# Patient Record
Sex: MALE | Race: WHITE | NOT HISPANIC OR LATINO | URBAN - METROPOLITAN AREA
[De-identification: names, ages, dates, MRNs, and addresses within clinical notes are randomized per-mention and may not be internally consistent; named-entity substitution may affect disease eponyms.]

---

## 2019-02-25 PROBLEM — Z00.00 ENCOUNTER FOR PREVENTIVE HEALTH EXAMINATION: Status: ACTIVE | Noted: 2019-02-25

## 2019-06-17 ENCOUNTER — INPATIENT (INPATIENT)
Facility: HOSPITAL | Age: 64
LOS: 2 days | Discharge: HOME | DRG: 312 | End: 2019-06-20
Attending: INTERNAL MEDICINE | Admitting: INTERNAL MEDICINE
Payer: COMMERCIAL

## 2019-06-17 VITALS
SYSTOLIC BLOOD PRESSURE: 133 MMHG | RESPIRATION RATE: 18 BRPM | WEIGHT: 205.03 LBS | DIASTOLIC BLOOD PRESSURE: 87 MMHG | HEIGHT: 72 IN | HEART RATE: 71 BPM | TEMPERATURE: 98 F | OXYGEN SATURATION: 100 %

## 2019-06-17 DIAGNOSIS — D35.2 BENIGN NEOPLASM OF PITUITARY GLAND: Chronic | ICD-10-CM

## 2019-06-17 DIAGNOSIS — R42 DIZZINESS AND GIDDINESS: ICD-10-CM

## 2019-06-17 LAB
ALBUMIN SERPL ELPH-MCNC: 5 G/DL — SIGNIFICANT CHANGE UP (ref 3.3–5)
ALP SERPL-CCNC: 69 U/L — SIGNIFICANT CHANGE UP (ref 40–120)
ALT FLD-CCNC: 32 U/L — SIGNIFICANT CHANGE UP (ref 10–45)
ANION GAP SERPL CALC-SCNC: 15 MMOL/L — SIGNIFICANT CHANGE UP (ref 5–17)
APPEARANCE UR: CLEAR — SIGNIFICANT CHANGE UP
AST SERPL-CCNC: 27 U/L — SIGNIFICANT CHANGE UP (ref 10–40)
BASOPHILS # BLD AUTO: 0 K/UL — SIGNIFICANT CHANGE UP (ref 0–0.2)
BASOPHILS NFR BLD AUTO: 0.2 % — SIGNIFICANT CHANGE UP (ref 0–2)
BILIRUB SERPL-MCNC: 0.5 MG/DL — SIGNIFICANT CHANGE UP (ref 0.2–1.2)
BILIRUB UR-MCNC: NEGATIVE — SIGNIFICANT CHANGE UP
BUN SERPL-MCNC: 19 MG/DL — SIGNIFICANT CHANGE UP (ref 7–23)
CALCIUM SERPL-MCNC: 10.2 MG/DL — SIGNIFICANT CHANGE UP (ref 8.4–10.5)
CHLORIDE SERPL-SCNC: 103 MMOL/L — SIGNIFICANT CHANGE UP (ref 96–108)
CHLORIDE UR-SCNC: <35 MMOL/L — SIGNIFICANT CHANGE UP
CO2 SERPL-SCNC: 25 MMOL/L — SIGNIFICANT CHANGE UP (ref 22–31)
COLOR SPEC: COLORLESS — SIGNIFICANT CHANGE UP
CREAT ?TM UR-MCNC: 31 MG/DL — SIGNIFICANT CHANGE UP
CREAT SERPL-MCNC: 1.12 MG/DL — SIGNIFICANT CHANGE UP (ref 0.5–1.3)
DIFF PNL FLD: NEGATIVE — SIGNIFICANT CHANGE UP
EOSINOPHIL # BLD AUTO: 0 K/UL — SIGNIFICANT CHANGE UP (ref 0–0.5)
EOSINOPHIL NFR BLD AUTO: 0.4 % — SIGNIFICANT CHANGE UP (ref 0–6)
GLUCOSE SERPL-MCNC: 104 MG/DL — HIGH (ref 70–99)
GLUCOSE UR QL: NEGATIVE — SIGNIFICANT CHANGE UP
HCT VFR BLD CALC: 48 % — SIGNIFICANT CHANGE UP (ref 39–50)
HGB BLD-MCNC: 16.8 G/DL — SIGNIFICANT CHANGE UP (ref 13–17)
KETONES UR-MCNC: NEGATIVE — SIGNIFICANT CHANGE UP
LEUKOCYTE ESTERASE UR-ACNC: NEGATIVE — SIGNIFICANT CHANGE UP
LYMPHOCYTES # BLD AUTO: 1.2 K/UL — SIGNIFICANT CHANGE UP (ref 1–3.3)
LYMPHOCYTES # BLD AUTO: 13.3 % — SIGNIFICANT CHANGE UP (ref 13–44)
MAGNESIUM SERPL-MCNC: 2.3 MG/DL — SIGNIFICANT CHANGE UP (ref 1.6–2.6)
MCHC RBC-ENTMCNC: 30.6 PG — SIGNIFICANT CHANGE UP (ref 27–34)
MCHC RBC-ENTMCNC: 35 GM/DL — SIGNIFICANT CHANGE UP (ref 32–36)
MCV RBC AUTO: 87.5 FL — SIGNIFICANT CHANGE UP (ref 80–100)
MONOCYTES # BLD AUTO: 0.6 K/UL — SIGNIFICANT CHANGE UP (ref 0–0.9)
MONOCYTES NFR BLD AUTO: 6.7 % — SIGNIFICANT CHANGE UP (ref 2–14)
NEUTROPHILS # BLD AUTO: 7.2 K/UL — SIGNIFICANT CHANGE UP (ref 1.8–7.4)
NEUTROPHILS NFR BLD AUTO: 79.5 % — HIGH (ref 43–77)
NITRITE UR-MCNC: NEGATIVE — SIGNIFICANT CHANGE UP
OSMOLALITY SERPL: 295 MOS/KG — SIGNIFICANT CHANGE UP (ref 275–300)
OSMOLALITY UR: 154 MOS/KG — LOW (ref 300–900)
PH UR: 7 — SIGNIFICANT CHANGE UP (ref 5–8)
PHOSPHATE SERPL-MCNC: 2 MG/DL — LOW (ref 2.5–4.5)
PLATELET # BLD AUTO: 187 K/UL — SIGNIFICANT CHANGE UP (ref 150–400)
POTASSIUM SERPL-MCNC: 4.2 MMOL/L — SIGNIFICANT CHANGE UP (ref 3.5–5.3)
POTASSIUM SERPL-SCNC: 4.2 MMOL/L — SIGNIFICANT CHANGE UP (ref 3.5–5.3)
PROT SERPL-MCNC: 8.1 G/DL — SIGNIFICANT CHANGE UP (ref 6–8.3)
PROT UR-MCNC: NEGATIVE — SIGNIFICANT CHANGE UP
RBC # BLD: 5.49 M/UL — SIGNIFICANT CHANGE UP (ref 4.2–5.8)
RBC # FLD: 12.9 % — SIGNIFICANT CHANGE UP (ref 10.3–14.5)
SODIUM SERPL-SCNC: 143 MMOL/L — SIGNIFICANT CHANGE UP (ref 135–145)
SODIUM UR-SCNC: <20 MMOL/L — SIGNIFICANT CHANGE UP
SP GR SPEC: 1.01 — LOW (ref 1.01–1.02)
TSH SERPL-MCNC: 2.36 UIU/ML — SIGNIFICANT CHANGE UP (ref 0.27–4.2)
UROBILINOGEN FLD QL: NEGATIVE — SIGNIFICANT CHANGE UP
VIT B12 SERPL-MCNC: 1183 PG/ML — SIGNIFICANT CHANGE UP (ref 232–1245)
WBC # BLD: 9 K/UL — SIGNIFICANT CHANGE UP (ref 3.8–10.5)
WBC # FLD AUTO: 9 K/UL — SIGNIFICANT CHANGE UP (ref 3.8–10.5)

## 2019-06-17 PROCEDURE — 93010 ELECTROCARDIOGRAM REPORT: CPT

## 2019-06-17 PROCEDURE — 99285 EMERGENCY DEPT VISIT HI MDM: CPT | Mod: 25

## 2019-06-17 PROCEDURE — 70450 CT HEAD/BRAIN W/O DYE: CPT | Mod: 26

## 2019-06-17 RX ORDER — HEPARIN SODIUM 5000 [USP'U]/ML
5000 INJECTION INTRAVENOUS; SUBCUTANEOUS EVERY 12 HOURS
Refills: 0 | Status: DISCONTINUED | OUTPATIENT
Start: 2019-06-17 | End: 2019-06-20

## 2019-06-17 RX ORDER — SODIUM CHLORIDE 9 MG/ML
1000 INJECTION, SOLUTION INTRAVENOUS ONCE
Refills: 0 | Status: COMPLETED | OUTPATIENT
Start: 2019-06-17 | End: 2019-06-17

## 2019-06-17 RX ORDER — DESMOPRESSIN ACETATE 0.1 MG/1
0.1 TABLET ORAL ONCE
Refills: 0 | Status: COMPLETED | OUTPATIENT
Start: 2019-06-17 | End: 2019-06-17

## 2019-06-17 RX ORDER — ONDANSETRON 8 MG/1
4 TABLET, FILM COATED ORAL ONCE
Refills: 0 | Status: COMPLETED | OUTPATIENT
Start: 2019-06-17 | End: 2019-06-17

## 2019-06-17 RX ORDER — PANTOPRAZOLE SODIUM 20 MG/1
40 TABLET, DELAYED RELEASE ORAL
Refills: 0 | Status: DISCONTINUED | OUTPATIENT
Start: 2019-06-17 | End: 2019-06-20

## 2019-06-17 RX ORDER — CITALOPRAM 10 MG/1
20 TABLET, FILM COATED ORAL DAILY
Refills: 0 | Status: DISCONTINUED | OUTPATIENT
Start: 2019-06-17 | End: 2019-06-20

## 2019-06-17 RX ORDER — DESMOPRESSIN ACETATE 0.1 MG/1
1 TABLET ORAL DAILY
Refills: 0 | Status: DISCONTINUED | OUTPATIENT
Start: 2019-06-17 | End: 2019-06-17

## 2019-06-17 RX ORDER — DESMOPRESSIN ACETATE 0.1 MG/1
1 TABLET ORAL DAILY
Refills: 0 | Status: DISCONTINUED | OUTPATIENT
Start: 2019-06-18 | End: 2019-06-20

## 2019-06-17 RX ORDER — MECLIZINE HCL 12.5 MG
25 TABLET ORAL ONCE
Refills: 0 | Status: COMPLETED | OUTPATIENT
Start: 2019-06-17 | End: 2019-06-17

## 2019-06-17 RX ORDER — DIAZEPAM 5 MG
5 TABLET ORAL ONCE
Refills: 0 | Status: DISCONTINUED | OUTPATIENT
Start: 2019-06-17 | End: 2019-06-17

## 2019-06-17 RX ADMIN — DESMOPRESSIN ACETATE 0.1 MILLIGRAM(S): 0.1 TABLET ORAL at 19:50

## 2019-06-17 RX ADMIN — HEPARIN SODIUM 5000 UNIT(S): 5000 INJECTION INTRAVENOUS; SUBCUTANEOUS at 23:41

## 2019-06-17 RX ADMIN — ONDANSETRON 4 MILLIGRAM(S): 8 TABLET, FILM COATED ORAL at 21:41

## 2019-06-17 RX ADMIN — Medication 5 MILLIGRAM(S): at 15:49

## 2019-06-17 RX ADMIN — CITALOPRAM 20 MILLIGRAM(S): 10 TABLET, FILM COATED ORAL at 23:42

## 2019-06-17 RX ADMIN — SODIUM CHLORIDE 1000 MILLILITER(S): 9 INJECTION, SOLUTION INTRAVENOUS at 14:44

## 2019-06-17 NOTE — PATIENT PROFILE ADULT - PATIENT REPRESENTATIVE: ( YOU CAN CHOOSE ANY PERSON THAT CAN ASSIST YOU WITH YOUR HEALTH CARE PREFERENCES, DOES NOT HAVE TO BE A SPOUSE, IMMEDIATE FAMILY OR SIGNIFICANT OTHER/PARTNER)
Pt reports elevated BP, was sent by PMD, reports sob, denies chest pain, arm pain, jaw or back pain yes

## 2019-06-17 NOTE — ED PROVIDER NOTE - ATTENDING CONTRIBUTION TO CARE
Nemes - 64yo M w/ hx of pituitary adenoma s/p distant resection and two separate revisions with subsequent diabetes insipidus on DDAVP daily, vertigo, presents with two days of progressively worsening dizziness and unsteadiness on his feet. Accompanied by nausea no vomiting but no spinning sensation. No other stx. On exam wide based gait, mild hesitancy to finger-to-nose coordination, otherwise neuro intact, no nystagmus. Poss hyponatremia vs vertigo vs CNS mass/bleed vs posterior circulation insufficiency. Will get w/u, likely admit given persistent stx and need for more w/u

## 2019-06-17 NOTE — ED PROVIDER NOTE - NS ED ROS FT
REVIEW OF SYSTEMS:    Constitutional:     [ ] negative [- ] fevers [- ] chills [ ] weight loss [ ] weight gain  HEENT:                  [ ] negative [ ] dry eyes [ ] eye irritation [ ] postnasal drip [ ] nasal congestion  CV:                         [ ] negative  [- ] chest pain [- ] orthopnea [- ] palpitations [ ] murmur  Resp:                     [ ] negative [- ] cough [ ] shortness of breath [ ] dyspnea [ ] wheezing [ ] sputum [ ] hemoptysis  GI:                          [ ] negative [+ ] nausea [- ] vomiting [ -] diarrhea [ ] constipation [ ] abd pain [ ] dysphagia   :                        [ ] negative [ -] dysuria [ -] nocturia [ -] hematuria [ +] increased urinary frequency  Musculoskeletal: [ ] negative [ -] back pain [ ] myalgias [ ] arthralgias [ ] fracture  Skin:                       [ ] negative [ -] rash [ ] itch  Neurological:        [ ] negative [- ] headache [+ ] dizziness [- ] syncope [ ] weakness [ ] numbness  Psychiatric:           [ ] negative [ ] anxiety [ ] depression  Endocrine:            [ ] negative [ +] diabetes [ ] thyroid problem  Heme/Lymph:      [ ] negative [ ] anemia [ ] bleeding problem  Allergic/Immune: [ ] negative [ ] itchy eyes [ ] nasal discharge [ ] hives [ ] angioedema    [ x] All other systems negative  [ ] Unable to assess ROS because ________.

## 2019-06-17 NOTE — H&P ADULT - HISTORY OF PRESENT ILLNESS
CHIEF COMPLAINT:Patient is a 63y old  Male who presents with a chief complaint of dizziness.    HPI:  63M w/ hx of pituitary adenoma s/p resection and two separate revisions with subsequent diabetes insipidus on DDAVP daily presents with two days of progressively worsening dizziness and unsteadiness on his feet. He denies room spinning. The dizziness is accompanied by nausea no vomiting. Pt states he has been urinating much more than normal despite normal dose of DDAVP. Denies dysuria, fevers, chills, chest pain, neck pain, SOB, abd pain, diarrhea, paresthesias, numbness one sided weakness, blurry vision. No worsening hearing loss, or ear fullness    PAST MEDICAL & SURGICAL HISTORY:  Diabetes insipidus  Pituitary adenoma  Adenoma of pituitary      MEDICATIONS  (STANDING):  desmopressin 0.01% Nasal 1 Spray(s) Nasal daily    MEDICATIONS  (PRN):      FAMILY HISTORY:  No pertinent family history in first degree relatives      SOCIAL HISTORY:    [ ] Non-smoker  [ ] Smoker  [ ] Alcohol    Allergies    No Known Allergies    Intolerances    	    REVIEW OF SYSTEMS:  CONSTITUTIONAL: No fever, weight loss, or fatigue  EYES: No eye pain, visual disturbances, or discharge  ENT:  No difficulty hearing, tinnitus, vertigo; No sinus or throat pain  NECK: No pain or stiffness  RESPIRATORY: No cough, wheezing, chills or hemoptysis; No Shortness of Breath  CARDIOVASCULAR: No chest pain, palpitations, passing out, + dizziness  GASTROINTESTINAL: No abdominal or epigastric pain. No nausea, vomiting, or hematemesis; No diarrhea or constipation. No melena or hematochezia.  GENITOURINARY: No dysuria, frequency, hematuria, or incontinence  NEUROLOGICAL: No headaches, memory loss, loss of strength, numbness, or tremors  SKIN: No itching, burning, rashes, or lesions   LYMPH Nodes: No enlarged glands  ENDOCRINE: No heat or cold intolerance; No hair loss  MUSCULOSKELETAL: No joint pain or swelling; No muscle, back, or extremity pain  PSYCHIATRIC: No depression, anxiety, mood swings, or difficulty sleeping  HEME/LYMPH: No easy bruising, or bleeding gums  ALLERGY AND IMMUNOLOGIC: No hives or eczema	    [ ] All others negative	  [ ] Unable to obtain    PHYSICAL EXAM:  T(C): 36.8 (06-17-19 @ 19:03), Max: 37 (06-17-19 @ 16:00)  HR: 69 (06-17-19 @ 19:03) (60 - 71)  BP: 120/77 (06-17-19 @ 19:03) (120/77 - 146/85)  RR: 16 (06-17-19 @ 19:03) (16 - 18)  SpO2: 97% (06-17-19 @ 19:03) (97% - 100%)  Wt(kg): --  I&O's Summary      Appearance: Normal	  HEENT:   Normal oral mucosa, PERRL, EOMI	  Lymphatic: No lymphadenopathy  Cardiovascular: Normal S1 S2, No JVD, + murmurs, No edema  Respiratory: Lungs clear to auscultation	  Psychiatry: A & O x 3, Mood & affect appropriate  Gastrointestinal:  Soft, Non-tender, + BS	  Skin: No rashes, No ecchymoses, No cyanosis	  Neurologic: Non-focal  Extremities: Normal range of motion, No clubbing, cyanosis or edema  Vascular: Peripheral pulses palpable 2+ bilaterally    TELEMETRY: 	    ECG:  	  RADIOLOGY:  OTHER: 	  	  LABS:	 	    CARDIAC MARKERS:                              16.8   9.0   )-----------( 187      ( 17 Jun 2019 14:54 )             48.0     06-17    143  |  103  |  19  ----------------------------<  104<H>  4.2   |  25  |  1.12    Ca    10.2      17 Jun 2019 14:54  Phos  2.0     06-17  Mg     2.3     06-17    TPro  8.1  /  Alb  5.0  /  TBili  0.5  /  DBili  x   /  AST  27  /  ALT  32  /  AlkPhos  69  06-17    proBNP:   Lipid Profile:   HgA1c:   TSH:       PREVIOUS DIAGNOSTIC TESTING:    < from: CT Head No Cont (06.17.19 @ 17:02) >  IMPRESSION: No prior imaging available for comparison. Asymmetric   ventricles with slightly enlarged left lateral ventricle compared with   the right which may be anatomic variation. No intraparenchymal masses.   Sellar soft tissue may represent residual neoplasm versus postoperative   changes. No large sellar mass identified.

## 2019-06-17 NOTE — ED PROVIDER NOTE - CLINICAL SUMMARY MEDICAL DECISION MAKING FREE TEXT BOX
63M w/ hx pituitary adenoma hx of resection and diabetes insipidus presents with increased urinary frequency and worsening unsteadiness, dizziness, and generalized weakness. Concerning for new intracranial complication, worsening DI, UTI. WIll obtain cbc, cmp, serum/urine osm, urine lytes, UA.

## 2019-06-17 NOTE — ED PROVIDER NOTE - PHYSICAL EXAMINATION
General: WN/WD NAD  HEENT: PERRLA, EOMI, moist mucous membranes  Neurology: A&Ox3, No nystagmus, CN II-XII in tact, difficulty with finger to nose testing bilaterally. heel-shin wnl, wide based gait, pt refusing ROmberg (scared to fall), no truncal ataxia, PHILLIP x 4, strength and sensation 5/5 in BUE and BLE.  Respiratory: CTA B/L, normal respiratory effort, no wheezes, crackles, rales  CV: RRR, S1S2, no murmurs, rubs or gallops  Abdominal: Soft, NT, ND +BS, Last BM  Extremities: No edema, + peripheral pulses  Incisions: none  Tubes: piv

## 2019-06-17 NOTE — H&P ADULT - ASSESSMENT
pt with hx of pituitary adenoma s/p resection with dizziness.  check orthostatic  ECG  endocrine eval  follow up lytes  ?echo  cortisol  TSH  DVT prophylaxis

## 2019-06-17 NOTE — ED ADULT NURSE NOTE - CHPI ED NUR SYMPTOMS NEG
no confusion/no numbness/no change in level of consciousness/no vomiting/no fever/no blurred vision/no weakness/no loss of consciousness

## 2019-06-17 NOTE — ED ADULT NURSE NOTE - OBJECTIVE STATEMENT
pt 64 yo male presents to er with wife for lightheaed dizziness x 2 weeks intermittently but increasing episodes verbalized on arrival vitals stable pt hx pituitary adenoma skin warm dry afevrile pt states nausea accompanies lightheadedness when symptoms appear

## 2019-06-17 NOTE — ED PROVIDER NOTE - OBJECTIVE STATEMENT
63M w/ hx of pituitary adenoma s/p resection and two separate revisions with subsequent diabetes insipidus on DDAVP daily presents with two days of progressively worsening dizziness and unsteadiness on his feet. He denies room spinning. The dizziness is accompanied by nausea no vomiting. Pt states he has been urinating much more than normal despite normal dose of DDAVP. Denies dysuria, fevers, chills, chest pain, neck pain, SOB, abd pain, diarrhea, paresthesias, numness, one sided weakness, blurry vision. No worsening hearing loss, or ear fullness

## 2019-06-18 LAB
ALBUMIN SERPL ELPH-MCNC: 4.5 G/DL — SIGNIFICANT CHANGE UP (ref 3.3–5)
ALP SERPL-CCNC: 64 U/L — SIGNIFICANT CHANGE UP (ref 40–120)
ALT FLD-CCNC: 30 U/L — SIGNIFICANT CHANGE UP (ref 10–45)
ANION GAP SERPL CALC-SCNC: 10 MMOL/L — SIGNIFICANT CHANGE UP (ref 5–17)
AST SERPL-CCNC: 24 U/L — SIGNIFICANT CHANGE UP (ref 10–40)
BILIRUB SERPL-MCNC: 0.5 MG/DL — SIGNIFICANT CHANGE UP (ref 0.2–1.2)
BUN SERPL-MCNC: 18 MG/DL — SIGNIFICANT CHANGE UP (ref 7–23)
CALCIUM SERPL-MCNC: 9.6 MG/DL — SIGNIFICANT CHANGE UP (ref 8.4–10.5)
CHLORIDE SERPL-SCNC: 105 MMOL/L — SIGNIFICANT CHANGE UP (ref 96–108)
CO2 SERPL-SCNC: 26 MMOL/L — SIGNIFICANT CHANGE UP (ref 22–31)
CREAT SERPL-MCNC: 1.21 MG/DL — SIGNIFICANT CHANGE UP (ref 0.5–1.3)
GLUCOSE SERPL-MCNC: 92 MG/DL — SIGNIFICANT CHANGE UP (ref 70–99)
HCT VFR BLD CALC: 45.3 % — SIGNIFICANT CHANGE UP (ref 39–50)
HCV AB S/CO SERPL IA: 0.15 S/CO — SIGNIFICANT CHANGE UP (ref 0–0.99)
HCV AB SERPL-IMP: SIGNIFICANT CHANGE UP
HGB BLD-MCNC: 15.1 G/DL — SIGNIFICANT CHANGE UP (ref 13–17)
MCHC RBC-ENTMCNC: 29.3 PG — SIGNIFICANT CHANGE UP (ref 27–34)
MCHC RBC-ENTMCNC: 33.4 GM/DL — SIGNIFICANT CHANGE UP (ref 32–36)
MCV RBC AUTO: 88 FL — SIGNIFICANT CHANGE UP (ref 80–100)
PLATELET # BLD AUTO: 185 K/UL — SIGNIFICANT CHANGE UP (ref 150–400)
POTASSIUM SERPL-MCNC: 4.2 MMOL/L — SIGNIFICANT CHANGE UP (ref 3.5–5.3)
POTASSIUM SERPL-SCNC: 4.2 MMOL/L — SIGNIFICANT CHANGE UP (ref 3.5–5.3)
PROT SERPL-MCNC: 7.3 G/DL — SIGNIFICANT CHANGE UP (ref 6–8.3)
RBC # BLD: 5.15 M/UL — SIGNIFICANT CHANGE UP (ref 4.2–5.8)
RBC # FLD: 13.1 % — SIGNIFICANT CHANGE UP (ref 10.3–14.5)
SODIUM SERPL-SCNC: 141 MMOL/L — SIGNIFICANT CHANGE UP (ref 135–145)
WBC # BLD: 7.8 K/UL — SIGNIFICANT CHANGE UP (ref 3.8–10.5)
WBC # FLD AUTO: 7.8 K/UL — SIGNIFICANT CHANGE UP (ref 3.8–10.5)

## 2019-06-18 PROCEDURE — 70553 MRI BRAIN STEM W/O & W/DYE: CPT | Mod: 26

## 2019-06-18 PROCEDURE — 99251: CPT

## 2019-06-18 RX ORDER — SODIUM CHLORIDE 9 MG/ML
1000 INJECTION INTRAMUSCULAR; INTRAVENOUS; SUBCUTANEOUS
Refills: 0 | Status: DISCONTINUED | OUTPATIENT
Start: 2019-06-18 | End: 2019-06-18

## 2019-06-18 RX ORDER — SODIUM CHLORIDE 9 MG/ML
1000 INJECTION INTRAMUSCULAR; INTRAVENOUS; SUBCUTANEOUS
Refills: 0 | Status: DISCONTINUED | OUTPATIENT
Start: 2019-06-18 | End: 2019-06-19

## 2019-06-18 RX ADMIN — PANTOPRAZOLE SODIUM 40 MILLIGRAM(S): 20 TABLET, DELAYED RELEASE ORAL at 06:33

## 2019-06-18 RX ADMIN — HEPARIN SODIUM 5000 UNIT(S): 5000 INJECTION INTRAVENOUS; SUBCUTANEOUS at 12:29

## 2019-06-18 RX ADMIN — SODIUM CHLORIDE 75 MILLILITER(S): 9 INJECTION INTRAMUSCULAR; INTRAVENOUS; SUBCUTANEOUS at 21:36

## 2019-06-18 RX ADMIN — CITALOPRAM 20 MILLIGRAM(S): 10 TABLET, FILM COATED ORAL at 12:29

## 2019-06-18 RX ADMIN — HEPARIN SODIUM 5000 UNIT(S): 5000 INJECTION INTRAVENOUS; SUBCUTANEOUS at 23:50

## 2019-06-18 RX ADMIN — DESMOPRESSIN ACETATE 1 SPRAY(S): 0.1 TABLET ORAL at 19:00

## 2019-06-18 RX ADMIN — SODIUM CHLORIDE 75 MILLILITER(S): 9 INJECTION INTRAMUSCULAR; INTRAVENOUS; SUBCUTANEOUS at 08:07

## 2019-06-18 NOTE — PROGRESS NOTE ADULT - ASSESSMENT
63M w/   hx of pituitary adenoma s/p resection and two separate revisions /  subsequent diabetes insipidus on DDAVP   presents with two days of progressively worsening dizziness and unsteadiness     check orthostatic  echo  ct head,  sellar  soft tissue  N/S eval  iv fluids/  sbp  in 90's  PT  eval  DVT prophylaxis     < from: CT Head No Cont (06.17.19 @ 17:02) >  IMPRESSION: No prior imaging available for comparison. Asymmetric   ventricles with slightly enlarged left lateral ventricle compared with   the right which may be anatomic variation. No intraparenchymal masses.   Sellar soft tissue may represent residual neoplasm versus postoperative   changes. No large sellar mass identified.  < end of copied text > 63M w/   hx of pituitary adenoma s/p resection and two separate revisions /  subsequent diabetes insipidus on DDAVP   presents with two days of progressively worsening dizziness and unsteadiness     echo  ct head,  sellar  soft tissue  N/S eval noted, no intervention  iv fluids/  sbp  in 90's,  pt not orthostatic  PT  eval  DVT prophylaxis     < from: CT Head No Cont (06.17.19 @ 17:02) >  IMPRESSION: No prior imaging available for comparison. Asymmetric   ventricles with slightly enlarged left lateral ventricle compared with   the right which may be anatomic variation. No intraparenchymal masses.   Sellar soft tissue may represent residual neoplasm versus postoperative   changes. No large sellar mass identified.  < end of copied text >

## 2019-06-18 NOTE — PROGRESS NOTE ADULT - SUBJECTIVE AND OBJECTIVE BOX
CARDIOLOGY     PROGRESS  NOTE   ________________________________________________    CHIEF COMPLAINT:Patient is a 63y old  Male who presents with a chief complaint of dizziness (18 Jun 2019 07:06)  no complain.  	  REVIEW OF SYSTEMS:  CONSTITUTIONAL: No fever, weight loss, or fatigue  EYES: No eye pain, visual disturbances, or discharge  ENT:  No difficulty hearing, tinnitus, vertigo; No sinus or throat pain  NECK: No pain or stiffness  RESPIRATORY: No cough, wheezing, chills or hemoptysis; No Shortness of Breath  CARDIOVASCULAR: No chest pain, palpitations, passing out, dizziness, or leg swelling  GASTROINTESTINAL: No abdominal or epigastric pain. No nausea, vomiting, or hematemesis; No diarrhea or constipation. No melena or hematochezia.  GENITOURINARY: No dysuria, frequency, hematuria, or incontinence  NEUROLOGICAL: No headaches, memory loss, loss of strength, numbness, or tremors  SKIN: No itching, burning, rashes, or lesions   LYMPH Nodes: No enlarged glands  ENDOCRINE: No heat or cold intolerance; No hair loss  MUSCULOSKELETAL: No joint pain or swelling; No muscle, back, or extremity pain  PSYCHIATRIC: No depression, anxiety, mood swings, or difficulty sleeping  HEME/LYMPH: No easy bruising, or bleeding gums  ALLERGY AND IMMUNOLOGIC: No hives or eczema	    [ ] All others negative	  [ ] Unable to obtain    PHYSICAL EXAM:  T(C): 36.4 (06-18-19 @ 04:32), Max: 37 (06-17-19 @ 16:00)  HR: 60 (06-18-19 @ 04:32) (60 - 71)  BP: 94/58 (06-18-19 @ 04:32) (94/58 - 159/71)  RR: 18 (06-18-19 @ 04:32) (16 - 21)  SpO2: 100% (06-18-19 @ 04:32) (96% - 100%)  Wt(kg): --  I&O's Summary    17 Jun 2019 07:01  -  18 Jun 2019 07:00  --------------------------------------------------------  IN: 120 mL / OUT: 200 mL / NET: -80 mL        Appearance: Normal	  HEENT:   Normal oral mucosa, PERRL, EOMI	  Lymphatic: No lymphadenopathy  Cardiovascular: Normal S1 S2, No JVD, +murmurs, No edema  Respiratory: Lungs clear to auscultation	  Psychiatry: A & O x 3, Mood & affect appropriate  Gastrointestinal:  Soft, Non-tender, + BS	  Skin: No rashes, No ecchymoses, No cyanosis	  Neurologic: Non-focal  Extremities: Normal range of motion, No clubbing, cyanosis or edema  Vascular: Peripheral pulses palpable 2+ bilaterally    MEDICATIONS  (STANDING):  citalopram 20 milliGRAM(s) Oral daily  desmopressin 0.01% Nasal 1 Spray(s) Nasal daily  heparin  Injectable 5000 Unit(s) SubCutaneous every 12 hours  pantoprazole    Tablet 40 milliGRAM(s) Oral before breakfast  sodium chloride 0.9%. 1000 milliLiter(s) (75 mL/Hr) IV Continuous <Continuous>      TELEMETRY: 	    ECG:  	  RADIOLOGY:  OTHER: 	  	  LABS:	 	    CARDIAC MARKERS:    < from: CT Head No Cont (06.17.19 @ 17:02) >  IMPRESSION: No prior imaging available for comparison. Asymmetric   ventricles with slightly enlarged left lateral ventricle compared with   the right which may be anatomic variation. No intraparenchymal masses.   Sellar soft tissue may represent residual neoplasm versus postoperative   changes. No large sellar mass identified.    < end of copied text >                              16.8   9.0   )-----------( 187      ( 17 Jun 2019 14:54 )             48.0     06-17    143  |  103  |  19  ----------------------------<  104<H>  4.2   |  25  |  1.12    Ca    10.2      17 Jun 2019 14:54  Phos  2.0     06-17  Mg     2.3     06-17    TPro  8.1  /  Alb  5.0  /  TBili  0.5  /  DBili  x   /  AST  27  /  ALT  32  /  AlkPhos  69  06-17    proBNP:   Lipid Profile:   HgA1c:   TSH: Thyroid Stimulating Hormone, Serum: 2.36 uIU/mL (06-17 @ 18:14)          Assessment and plan  ---------------------------  dizziness /heart murmur  awaiting MRI/MRA  endocrine eval  echo  orthostatic bp/hr  dvt prophylaxis

## 2019-06-18 NOTE — CONSULT NOTE ADULT - ASSESSMENT
62 yo male with history of pituitary tumor resection with dizziness.     1 No neuro surgical intervention at this time   2 mri is stable   3 patient can follow up with his own neurosurgeon as outpatient .   4 Dr. zuniga to see patient  5 discussed with dr. zuniga

## 2019-06-18 NOTE — CONSULT NOTE ADULT - ASSESSMENT
This is a 63y year old Male with the below past medical history who presents with the chief complaint of dizziness, getting worse and more frequent in past months maybe 1 year to 1.5 years. patient has hx of pituitary adenoma resected 1991 then 2 revisions 1997 and 2007, has had BPV episodes since then manageable and short lived , has meclizine prn, follows with ENT. but lately more frequent, unable to have a normal day, suddenly an episode hits him and he feels nauseous and wiht any movement has vomiting. denies room spinning sensation this time as in the past, just lightheaded and left head dull ache/ pressure feeling. no tinnitus , no falls, no vision changes. ate breakfast this morning ok, has been OOb and walking ok. inbetween episodes normal. has diabetes insipidus since surgeries so has to be cautious how much water intake he has. now in hospital found to be orthostatic hypotensive getting IVFs. afebrile denies new medications or recent illness. has hx of anxiety takes citalopram. last mri brain was 1 year ago spouse brining in CD. ct head on admission stable. some soft tissue noted in surgical area unclear if more or from post op changes. going for MRI/A today. 	patient seen by my associates Dr Curran and Dr Canseco in past.  Patient was referred from ENT back to Dr Curran, was supposed to have appt tomorrow in the office.    normal nonfocal neurological exam , stable symptoms a at this time    recommendations:  -orthostatic hypotension symptomatic + getting IVFs  -endo management to avoid dehydration in setting of diabetes insipidus from now on  -ct head reviewed, unremarkable findings  -await MRI brain w/wo and MRA brain and neck studies planned, rule out vertebrobasilar insuffiency  -symptoms and patient description by history suspicious for peripheral vertigo more than central type. also orthostatic hypotension.  -vestibular rehab once studies done and MRI intracranial no abnormal findings  -dalton prn nausea/vomiting  -OOB as tolerated

## 2019-06-18 NOTE — CONSULT NOTE ADULT - SUBJECTIVE AND OBJECTIVE BOX
Admitting Diagnosis:  Dizziness and giddiness (R42): DIZZINESS AND GIDDINESS      HPI:  This is a 63y year old Male with the below past medical history who presents with the chief complaint of dizziness, getting worse and more frequent in past months maybe 1 year to 1.5 years. patient has hx of pituitary adenoma resected  then 2 revisions  and , has had BPV episodes since then manageable and short lived , has meclizine prn, follows with ENT. but lately more frequent, unable to have a normal day, suddenly an episode hits him and he feels nauseous and wiht any movement has vomiting. denies room spinning sensation this time as in the past, just lightheaded and left head dull ache/ pressure feeling. no tinnitus , no falls, no vision changes. ate breakfast this morning ok, has been OOb and walking ok. inbetween episodes normal. has diabetes insipidus since surgeries so has to be cautious how much water intake he has. now in hospital found to be orthostatic hypotensive getting IVFs. afebrile denies new medications or recent illness. has hx of anxiety takes citalopram. last mri brain was 1 year ago spouse brining in CD. ct head on admission stable. some soft tissue noted in surgical area unclear if more or from post op changes. going for MRI/A today. 	patient seen by my associates Dr Curran and Dr Canseco in past.  was referred from ENT back to Dr Curran, was supposed to have appt tomorrow in the office.    Past Medical History:  Diabetes insipidus (E23.2)  Pituitary adenoma (D35.2)      Past Surgical History:  Adenoma of pituitary (D35.2)      Social History:  No toxic habits    Family History:  FAMILY HISTORY:  No pertinent family history in first degree relatives      Allergies:  No Known Allergies      ROS:  Constitutional: Patient offers no complaints of fevers or significant weight loss  Ears, Nose, Mouth and Throat: The patient presents with no abnormalities of the head, ears, eyes, nose or throat  Skin: Patient offers no concerns of new rashes or lesions  Respiratory: The patient presents with no abnormalities of the respiratory tract  Cardiovascular: The patient presents with no cardiac abnormalities  Gastrointestinal: The patient presents with no abnormalities of the GI system  Genitourinary: The patient presents with no dysuria, hematuria or frequent urination  Neurological: See HPI  Endocrine: Patient offers no complaints of excessive thirst, urination, or heat/cold intolerance    Advanced care planning reviewed and noted in the chart.    Medications:  citalopram 20 milliGRAM(s) Oral daily  desmopressin 0.01% Nasal 1 Spray(s) Nasal daily  heparin  Injectable 5000 Unit(s) SubCutaneous every 12 hours  pantoprazole    Tablet 40 milliGRAM(s) Oral before breakfast  sodium chloride 0.9%. 1000 milliLiter(s) IV Continuous <Continuous>      Labs:  CBC Full  -  ( 2019 07:01 )  WBC Count : 7.8 K/uL  RBC Count : 5.15 M/uL  Hemoglobin : 15.1 g/dL  Hematocrit : 45.3 %  Platelet Count - Automated : 185 K/uL  Mean Cell Volume : 88.0 fl  Mean Cell Hemoglobin : 29.3 pg  Mean Cell Hemoglobin Concentration : 33.4 gm/dL  Auto Neutrophil # : x  Auto Lymphocyte # : x  Auto Monocyte # : x  Auto Eosinophil # : x  Auto Basophil # : x  Auto Neutrophil % : x  Auto Lymphocyte % : x  Auto Monocyte % : x  Auto Eosinophil % : x  Auto Basophil % : x    06-18    141  |  105  |  18  ----------------------------<  92  4.2   |  26  |  1.21    Ca    9.6      2019 07:01  Phos  2.0     06-17  Mg     2.3     06-17    TPro  7.3  /  Alb  4.5  /  TBili  0.5  /  DBili  x   /  AST  24  /  ALT  30  /  AlkPhos  64  06-18    CAPILLARY BLOOD GLUCOSE        LIVER FUNCTIONS - ( 2019 07:01 )  Alb: 4.5 g/dL / Pro: 7.3 g/dL / ALK PHOS: 64 U/L / ALT: 30 U/L / AST: 24 U/L / GGT: x             Urinalysis Basic - ( 2019 14:57 )    Color: Colorless / Appearance: Clear / S.006 / pH: x  Gluc: x / Ketone: Negative  / Bili: Negative / Urobili: Negative   Blood: x / Protein: Negative / Nitrite: Negative   Leuk Esterase: Negative / RBC: x / WBC x   Sq Epi: x / Non Sq Epi: x / Bacteria: x      Male    Vitals:  Vital Signs Last 24 Hrs  T(C): 36.4 (2019 04:32), Max: 37 (2019 16:00)  T(F): 97.6 (2019 04:32), Max: 98.6 (2019 16:00)  HR: 60 (2019 04:32) (60 - 71)  BP: 94/58 (2019 04:32) (94/58 - 159/71)  BP(mean): --  RR: 18 (2019 04:32) (16 - 21)  SpO2: 100% (2019 04:32) (96% - 100%)    NEUROLOGICAL EXAM:    Mental status: Awake, alert, and in no apparent distress. Oriented to person, place and time. Language function is normal. Recent memory, digit span and concentration were normal.     Cranial Nerves: Pupils were equal, round, reactive to light. Extraocular movements were intact. Visual field were full. Fundoscopic exam was deferred. Facial sensation was intact to light touch. There was no facial asymmetry. The palate was upgoing symmetrically and tongue was midline. Hearing acuity was intact to finger rub AU. Shoulder shrug was full bilaterally    Motor exam: Bulk and tone were normal. Strength was 5/5 in all four extremities. Fine finger movements were symmetric and normal. There was no pronator drift    Reflexes: 2+ in the bilateral upper extremities. 2+ in the bilateral lower extremities. Toes were downgoing bilaterally.     Sensation: Intact to light touch, temperature, vibration and proprioception.     Coordination: Finger-nose-finger and heel-to-shin was without dysmetria. subtle end target tremor L hand    Gait: Narrow base and steady. Romberg was negative.     < from: CT Head No Cont (19 @ 17:02) >  EXAM:  CT BRAIN                            PROCEDURE DATE:  2019            INTERPRETATION:    CLINICAL INDICATION: Progressive ataxia for 2 days with nausea, prior to   pituitary adenoma surgery    5mm axial sections of the brain were obtained from base to vertex,   without the intravenous administration of contrast material. Coronal and   sagittal computer generated reconstructed views are available    No prior brain imaging is available for comparison.    There is mild atrophy for the patient's age. The left lateral ventricle   is larger than the right which may be due to normal anatomic variation.   There is no evidence of transependymal flow. No hemorrhage or mass is   identified. There is no large sellar mass extending into the suprasellar   region. A small amount of soft tissue in the sella is identified   measuring 8.1 mm in craniocaudal diameter which could represent   postoperative changes versus residual neoplasm. There is no chiasm   compression. There is left sinus opacification.     IMPRESSION: No prior imaging available for comparison. Asymmetric   ventricles with slightly enlarged left lateral ventricle compared with   the right which may be anatomic variation. No intraparenchymal masses.   Sellar soft tissue may represent residual neoplasm versus postoperative   changes. No large sellar mass identified.                    MAN STEINBERG M.D., ATTENDING RADIOLOGIST  This document has been electronically signed. 2019  5:07PM                < end of copied text >

## 2019-06-18 NOTE — CONSULT NOTE ADULT - SUBJECTIVE AND OBJECTIVE BOX
Patient was seen and evaluated at bedside. Patient is resting in bed and is in no new acute distress. Patient states that he gets very tired and dizzy with any activities also had vomiting episodes. Denies any trauma, any severe headache, no blurry vision and no seizure.         Significant pmhx:   Pituitary tumor resection X3 -last one in 2003 at [place].  PAST MEDICAL & SURGICAL HISTORY:  Diabetes insipidus  Pituitary adenoma  Adenoma of pituitary      MEDICATIONS  (STANDING):  desmopressin 0.01% Nasal 1 Spray(s) Nasal daily    Exam -awake alert oriented to person place and time, follows and moves all extremities, eom intact, no drift, no field cut .   muscle strength wnl  Imaging-Mri was compared from now to 2008 and it was stable.

## 2019-06-19 DIAGNOSIS — D35.2 BENIGN NEOPLASM OF PITUITARY GLAND: ICD-10-CM

## 2019-06-19 DIAGNOSIS — E23.2 DIABETES INSIPIDUS: ICD-10-CM

## 2019-06-19 LAB — CORTIS AM PEAK SERPL-MCNC: 4.7 UG/DL — LOW (ref 6–18.4)

## 2019-06-19 RX ORDER — DIAZEPAM 5 MG
5 TABLET ORAL ONCE
Refills: 0 | Status: DISCONTINUED | OUTPATIENT
Start: 2019-06-19 | End: 2019-06-19

## 2019-06-19 RX ORDER — ONDANSETRON 8 MG/1
4 TABLET, FILM COATED ORAL EVERY 8 HOURS
Refills: 0 | Status: DISCONTINUED | OUTPATIENT
Start: 2019-06-19 | End: 2019-06-20

## 2019-06-19 RX ADMIN — Medication 5 MILLIGRAM(S): at 22:10

## 2019-06-19 RX ADMIN — CITALOPRAM 20 MILLIGRAM(S): 10 TABLET, FILM COATED ORAL at 11:26

## 2019-06-19 RX ADMIN — HEPARIN SODIUM 5000 UNIT(S): 5000 INJECTION INTRAVENOUS; SUBCUTANEOUS at 22:10

## 2019-06-19 RX ADMIN — DESMOPRESSIN ACETATE 1 SPRAY(S): 0.1 TABLET ORAL at 16:39

## 2019-06-19 RX ADMIN — PANTOPRAZOLE SODIUM 40 MILLIGRAM(S): 20 TABLET, DELAYED RELEASE ORAL at 06:10

## 2019-06-19 RX ADMIN — HEPARIN SODIUM 5000 UNIT(S): 5000 INJECTION INTRAVENOUS; SUBCUTANEOUS at 11:26

## 2019-06-19 RX ADMIN — Medication 40 MILLIGRAM(S): at 13:10

## 2019-06-19 NOTE — PROGRESS NOTE ADULT - SUBJECTIVE AND OBJECTIVE BOX
CARDIOLOGY     PROGRESS  NOTE   ________________________________________________    CHIEF COMPLAINT:Patient is a 63y old  Male who presents with a chief complaint of dizziness (18 Jun 2019 15:31)  still intermittent dizziness.  	  REVIEW OF SYSTEMS:  CONSTITUTIONAL: No fever, weight loss, or fatigue  EYES: No eye pain, visual disturbances, or discharge  ENT:  No difficulty hearing, tinnitus, vertigo; No sinus or throat pain  NECK: No pain or stiffness  RESPIRATORY: No cough, wheezing, chills or hemoptysis; No Shortness of Breath  CARDIOVASCULAR: No chest pain, palpitations, passing out, dizziness, or leg swelling  GASTROINTESTINAL: No abdominal or epigastric pain. No nausea, vomiting, or hematemesis; No diarrhea or constipation. No melena or hematochezia.  GENITOURINARY: No dysuria, frequency, hematuria, or incontinence  NEUROLOGICAL: No headaches, memory loss, loss of strength, numbness, or tremors  SKIN: No itching, burning, rashes, or lesions   LYMPH Nodes: No enlarged glands  ENDOCRINE: No heat or cold intolerance; No hair loss  MUSCULOSKELETAL: No joint pain or swelling; No muscle, back, or extremity pain  PSYCHIATRIC: No depression, anxiety, mood swings, or difficulty sleeping  HEME/LYMPH: No easy bruising, or bleeding gums  ALLERGY AND IMMUNOLOGIC: No hives or eczema	    [ ] All others negative	  [ ] Unable to obtain    PHYSICAL EXAM:  T(C): 36.6 (06-19-19 @ 04:33), Max: 37.1 (06-18-19 @ 20:49)  HR: 62 (06-19-19 @ 04:33) (55 - 63)  BP: 98/60 (06-19-19 @ 04:33) (97/55 - 121/68)  RR: 18 (06-19-19 @ 04:33) (17 - 18)  SpO2: 99% (06-19-19 @ 04:33) (96% - 100%)  Wt(kg): --  I&O's Summary    17 Jun 2019 07:01  -  18 Jun 2019 07:00  --------------------------------------------------------  IN: 120 mL / OUT: 200 mL / NET: -80 mL    18 Jun 2019 07:01  -  19 Jun 2019 06:44  --------------------------------------------------------  IN: 1480 mL / OUT: 700 mL / NET: 780 mL        Appearance: Normal	  HEENT:   Normal oral mucosa, PERRL, EOMI	  Lymphatic: No lymphadenopathy  Cardiovascular: Normal S1 S2, No JVD, + murmurs, No edema  Respiratory: Lungs clear to auscultation	  Psychiatry: A & O x 3, Mood & affect appropriate  Gastrointestinal:  Soft, Non-tender, + BS	  Skin: No rashes, No ecchymoses, No cyanosis	  Neurologic: Non-focal  Extremities: Normal range of motion, No clubbing, cyanosis or edema  Vascular: Peripheral pulses palpable 2+ bilaterally    MEDICATIONS  (STANDING):  citalopram 20 milliGRAM(s) Oral daily  desmopressin 0.01% Nasal 1 Spray(s) Nasal daily  heparin  Injectable 5000 Unit(s) SubCutaneous every 12 hours  pantoprazole    Tablet 40 milliGRAM(s) Oral before breakfast  sodium chloride 0.9%. 1000 milliLiter(s) (75 mL/Hr) IV Continuous <Continuous>      TELEMETRY: bradycardia	    ECG:  	  RADIOLOGY:  OTHER: 	  	  LABS:	 	    CARDIAC MARKERS:                                15.1   7.8   )-----------( 185      ( 18 Jun 2019 07:01 )             45.3     06-18    141  |  105  |  18  ----------------------------<  92  4.2   |  26  |  1.21    Ca    9.6      18 Jun 2019 07:01  Phos  2.0     06-17  Mg     2.3     06-17    TPro  7.3  /  Alb  4.5  /  TBili  0.5  /  DBili  x   /  AST  24  /  ALT  30  /  AlkPhos  64  06-18    proBNP:   Lipid Profile:   HgA1c:   TSH: Thyroid Stimulating Hormone, Serum: 2.36 uIU/mL (06-17 @ 18:14)      < from: MR Head w/wo IV Cont (06.18.19 @ 12:10) >  IMPRESSION:  Postop changes at the level of the sphenoid sinus. No   evidence of acute infarct. No evidence of intracranial hemorrhage.   Pituitary stalk is deviated to the left with residual soft tissue in the   right aspect of the sella which may represent the patient's normal   pituitary gland or recurrent disease in a patient with a history of   adenoma. No preop evaluation is available for comparison    < end of copied text >      Assessment and plan  ---------------------------  dizziness doubt sec to bradycardia  awaiting echo  may consider endocrine eval pt sees DR Adams as out pt  check orthostatic  doubt dizziness sec to bradycardia

## 2019-06-19 NOTE — CONSULT NOTE ADULT - SUBJECTIVE AND OBJECTIVE BOX
HPI:  CHIEF COMPLAINT:Patient is a 63y old  Male who presents with a chief complaint of dizziness.    HPI:  63M w/ hx of pituitary adenoma s/p resection and two separate revisions with subsequent diabetes insipidus on DDAVP daily presents with two days of progressively worsening dizziness and unsteadiness on his feet. He denies room spinning. The dizziness is accompanied by nausea no vomiting. Pt states he has been urinating much more than normal despite normal dose of DDAVP. Denies dysuria, fevers, chills, chest pain, neck pain, SOB, abd pain, diarrhea, paresthesias, numbness one sided weakness, blurry vision. No worsening hearing loss, or ear fullness    Admit Diagnosis  Dizziness and giddiness      ENDOCRINE HPI: 62 y/o post 3 operations for pituitary tumor due to recurrence, residual DI,  admitted with dizziness.    Patient post three pituitary tumor surgeries, last at 2007 with Dr. Santos. No evidence of recurrence since. Patient with residual DI, however other pituitary functions are normal, not on any other replacement. Followed by Dr. Adams last seen December 2018.   Patient admitted with recurrent episodes of dizziness that happen while in bed, if he turns his head. This results in immediate dizziness, sometimes with cold sweat. It lasts for a few minutes to half an hour. No N/V, no recent weight loss, had a recent cold with no fever and clear sputum.   He had a "bad cold" this winter with bronchitis, and responded well to steroids.   Here noted with mild orthostatic changes, and low late am cortisol- 4.9.    Patient was seen by neurology today, impression of peripheral vertigo, given prednisone 40 mg, which till now, 3 hours later did not improve his symptoms..  Seen by LILY ALMEIDA.         PAST MEDICAL & SURGICAL HISTORY:  Diabetes insipidus post op.  Pituitary adenoma  Adenoma of pituitary resection X3 last 2007.  Anxiety      FAMILY HISTORY:  No pertinent family history in first degree relatives      Social History:    Outpatient Medications:    MEDICATIONS  (STANDING):  citalopram 20 milliGRAM(s) Oral daily  desmopressin 0.01% Nasal 1 Spray(s) Nasal daily  heparin  Injectable 5000 Unit(s) SubCutaneous every 12 hours  pantoprazole    Tablet 40 milliGRAM(s) Oral before breakfast  predniSONE   Tablet 40 milliGRAM(s) Oral daily    MEDICATIONS  (PRN):  ondansetron Injectable 4 milliGRAM(s) IV Push every 8 hours PRN Nausea and/or Vomiting      Allergies    No Known Allergies    Intolerances        Review of Systems:  Constitutional: No fever, no chills, recent mild cold with cough. dizziness.  Eyes: No blurry vision  Neuro: No tremors  HEENT: No pain  Cardiovascular: mild chest pain with cough, no palpitations  Respiratory: No SOB, cough  GI: No nausea, vomiting, abdominal pain  : No dysuria, polyuria.   Skin: no rash  Psych: no depression  Endocrine: polyuria, polydipsia  Hem/lymph: no swelling  Osteoporosis: no fractures    ALL OTHER SYSTEMS REVIEWED AND NEGATIVE    PHYSICAL EXAM:  VITALS: T(C): 36.7 (06-19-19 @ 13:39)  T(F): 98.1 (06-19-19 @ 13:39), Max: 98.7 (06-18-19 @ 20:49)  HR: 65 (06-19-19 @ 13:39) (55 - 65)  BP: 114/72 (06-19-19 @ 13:39) (97/55 - 123/80)  RR:  (17 - 20)  SpO2:  (96% - 99%)  GENERAL: NAD, well-groomed, well-developed, anxious.  EYES: No proptosis, no lid lag, anicteric  HEENT:  Atraumatic, Normocephalic, moist mucous membranes, hearing aids.  THYROID: Normal size, no palpable nodules  RESPIRATORY: Clear to auscultation bilaterally; No rales, rhonchi, wheezing  CARDIOVASCULAR: Regular rate and rhythm; No murmurs; no peripheral edema  GI: Soft, nontender, non distended, normal bowel sounds  SKIN: Dry, intact, No rashes or lesions  MUSCULOSKELETAL: Full range of motion, normal strength  NEURO: sensation intact, extraocular movements intact, no tremor  PSYCH: Alert and oriented x 3, normal affect, normal mood                              15.1   7.8   )-----------( 185      ( 18 Jun 2019 07:01 )             45.3       06-18    141  |  105  |  18  ----------------------------<  92  4.2   |  26  |  1.21    Ca    9.6      18 Jun 2019 07:01    TPro  7.3  /  Alb  4.5  /  TBili  0.5  /  DBili  x   /  AST  24  /  ALT  30  /  AlkPhos  64  06-18      Thyroid Function Tests:  06-17 @ 18:14 TSH 2.36 FreeT4 -- T3 -- Anti TPO -- Anti Thyroglobulin Ab -- TSI --              Radiology:   < from: MR Head w/wo IV Cont (06.18.19 @ 12:10) >  EXAM:  MR BRAIN WAW IC                            PROCEDURE DATE:  06/18/2019            INTERPRETATION:  INDICATIONS:  63M w/ hx pituitary adenoma s/p resection   w/ worsening dizziness/unsteadiness    TECHNIQUE:  Multiplanar imaging was performedusing T1 weighted, T2   weighted and FLAIR sequences.  Diffusion weighted and SWI images were   obtained.        COMPARISON EXAMINATION:  CT 6/17/2019    FINDINGS:    Ventricles and sulci: Asymmetric appearance to the ventricles with the   left beinglarger than the right which appears to be on a congenital   basis. No evidence of obstruction  Intra-axial: Pituitary stalk is deviated to the left. There is residual   soft tissue in the right aspect of the sella which may relate to residual   normal gland however, no pre-op evaluation is available for comparison.   Alternatively, this may represent recurrent disease given history of   prior pituitary surgery for adenoma.  Extra-axial:  No mass or collection.  Visualized sinuses: Postop changes at the level of the sphenoid sinuses  Visualized mastoids:  Normal.  Calvarium:  Normal.  Carotid flow voids:  Present.    Miscellaneous:  None.    IMPRESSION:  Postop changes at the level of the sphenoid sinus. No   evidence of acute infarct. No evidence of intracranial hemorrhage.   Pituitary stalk is deviated to the left with residual soft tissue in the   right aspect of the sella which may represent the patient's normal   pituitary gland or recurrent disease in a patient with a history of   adenoma. No preop evaluation is available for comparison    NILA BLANCO M.D., ATTENDING RADIOLOGIST  This document has been electronically signed. Jun 18 2019 12:38PM        < end of copied text >

## 2019-06-19 NOTE — PROGRESS NOTE ADULT - SUBJECTIVE AND OBJECTIVE BOX
Admitting Diagnosis:  Dizziness and giddiness (R42): DIZZINESS AND GIDDINESS      HPI:  This is a 63y year old Male with the below past medical history who presents with the chief complaint of dizziness, getting worse and more frequent in past months maybe 1 year to 1.5 years. patient has hx of pituitary adenoma resected  then 2 revisions  and , has had BPV episodes since then manageable and short lived , has meclizine prn, follows with ENT. but lately more frequent, unable to have a normal day, suddenly an episode hits him and he feels nauseous and wiht any movement has vomiting. denies room spinning sensation this time as in the past, just lightheaded and left head dull ache/ pressure feeling. no tinnitus , no falls, no vision changes. ate breakfast this morning ok, has been OOb and walking ok. inbetween episodes normal. has diabetes insipidus since surgeries so has to be cautious how much water intake he has. now in hospital found to be orthostatic hypotensive getting IVFs. afebrile denies new medications or recent illness. has hx of anxiety takes citalopram. last mri brain was 1 year ago spouse brining in CD. ct head on admission stable. some soft tissue noted in surgical area unclear if more or from post op changes. going for MRI/A today. 	patient seen by my associates Dr Curran and Dr Canseco in past.  was referred from ENT back to Dr Curran, was supposed to have appt with him.      had some brief vertigo today    Past Medical History:  Diabetes insipidus (E23.2)  Pituitary adenoma (D35.2)      Past Surgical History:  Adenoma of pituitary (D35.2)      Social History:  No toxic habits    Family History:  FAMILY HISTORY:  No pertinent family history in first degree relatives      Allergies:  No Known Allergies      ROS:  Constitutional: Patient offers no complaints of fevers or significant weight loss  Ears, Nose, Mouth and Throat: The patient presents with no abnormalities of the head, ears, eyes, nose or throat  Skin: Patient offers no concerns of new rashes or lesions  Respiratory: The patient presents with no abnormalities of the respiratory tract  Cardiovascular: The patient presents with no cardiac abnormalities  Gastrointestinal: The patient presents with no abnormalities of the GI system  Genitourinary: The patient presents with no dysuria, hematuria or frequent urination  Neurological: See HPI  Endocrine: Patient offers no complaints of excessive thirst, urination, or heat/cold intolerance    Advanced care planning reviewed and noted in the chart.      Medications:  citalopram 20 milliGRAM(s) Oral daily  desmopressin 0.01% Nasal 1 Spray(s) Nasal daily  heparin  Injectable 5000 Unit(s) SubCutaneous every 12 hours  pantoprazole    Tablet 40 milliGRAM(s) Oral before breakfast  sodium chloride 0.9%. 1000 milliLiter(s) IV Continuous <Continuous>      Labs:  CBC Full  -  ( 2019 07:01 )  WBC Count : 7.8 K/uL  RBC Count : 5.15 M/uL  Hemoglobin : 15.1 g/dL  Hematocrit : 45.3 %  Platelet Count - Automated : 185 K/uL  Mean Cell Volume : 88.0 fl  Mean Cell Hemoglobin : 29.3 pg  Mean Cell Hemoglobin Concentration : 33.4 gm/dL  Auto Neutrophil # : x  Auto Lymphocyte # : x  Auto Monocyte # : x  Auto Eosinophil # : x  Auto Basophil # : x  Auto Neutrophil % : x  Auto Lymphocyte % : x  Auto Monocyte % : x  Auto Eosinophil % : x  Auto Basophil % : x        141  |  105  |  18  ----------------------------<  92  4.2   |  26  |  1.21    Ca    9.6      2019 07:01  Phos  2.0       Mg     2.3         TPro  7.3  /  Alb  4.5  /  TBili  0.5  /  DBili  x   /  AST  24  /  ALT  30  /  AlkPhos  64  18    CAPILLARY BLOOD GLUCOSE        LIVER FUNCTIONS - ( 2019 07:01 )  Alb: 4.5 g/dL / Pro: 7.3 g/dL / ALK PHOS: 64 U/L / ALT: 30 U/L / AST: 24 U/L / GGT: x             Urinalysis Basic - ( 2019 14:57 )    Color: Colorless / Appearance: Clear / S.006 / pH: x  Gluc: x / Ketone: Negative  / Bili: Negative / Urobili: Negative   Blood: x / Protein: Negative / Nitrite: Negative   Leuk Esterase: Negative / RBC: x / WBC x   Sq Epi: x / Non Sq Epi: x / Bacteria: x      Vitamin B12: 1183 pg/mL [605 - 1245] 19 @ 18:14  Folate: -- 19 @ 18:14  Thyroid Function: -- 19 @ 18:14  Ammonia: -- 19 @ 18:14  Dilantin: -- 19 @ 18:14      Vitals:  Vital Signs Last 24 Hrs  T(C): 36.6 (2019 09:18), Max: 37.1 (2019 20:49)  T(F): 97.9 (2019 09:18), Max: 98.7 (2019 20:49)  HR: 60 (2019 09:18) (55 - 63)  BP: 123/80 (2019 09:18) (97/55 - 123/80)  BP(mean): --  RR: 19 (:18) (17 - 19)  SpO2: 99% (:18) (96% - 100%)    NEUROLOGICAL EXAM:    Mental status: Awake, alert, and in no apparent distress. Oriented to person, place and time. Language function is normal. Recent memory, digit span and concentration were normal.     Cranial Nerves: Pupils were equal, round, reactive to light. Extraocular movements were intact. Visual field were full. Fundoscopic exam was deferred. Facial sensation was intact to light touch. There was no facial asymmetry. The palate was upgoing symmetrically and tongue was midline. Hearing acuity was intact to finger rub AU. Shoulder shrug was full bilaterally    Motor exam: Bulk and tone were normal. Strength was 5/5 in all four extremities. Fine finger movements were symmetric and normal. There was no pronator drift    Reflexes: 2+ in the bilateral upper extremities. 2+ in the bilateral lower extremities. Toes were downgoing bilaterally.     Sensation: Intact to light touch, temperature, vibration and proprioception.     Coordination: Finger-nose-finger and heel-to-shin was without dysmetria. subtle end target tremor L hand    Gait: Narrow base and steady. Romberg was negative.     < from: CT Head No Cont (19 @ 17:02) >  EXAM:  CT BRAIN                            PROCEDURE DATE:  2019            INTERPRETATION:    CLINICAL INDICATION: Progressive ataxia for 2 days with nausea, prior to   pituitary adenoma surgery    5mm axial sections of the brain were obtained from base to vertex,   without the intravenous administration of contrast material. Coronal and   sagittal computer generated reconstructed views are available    No prior brain imaging is available for comparison.    There is mild atrophy for the patient's age. The left lateral ventricle   is larger than the right which may be due to normal anatomic variation.   There is no evidence of transependymal flow. No hemorrhage or mass is   identified. There is no large sellar mass extending into the suprasellar   region. A small amount of soft tissue in the sella is identified   measuring 8.1 mm in craniocaudal diameter which could represent   postoperative changes versus residual neoplasm. There is no chiasm   compression. There is left sinus opacification.     IMPRESSION: No prior imaging available for comparison. Asymmetric   ventricles with slightly enlarged left lateral ventricle compared with   the right which may be anatomic variation. No intraparenchymal masses.   Sellar soft tissue may represent residual neoplasm versus postoperative   changes. No large sellar mass identified.                    MAN STEINBERG M.D., ATTENDING RADIOLOGIST  This document has been electronically signed. 2019  5:07PM      < from: MR Head w/wo IV Cont (19 @ 12:10) >    EXAM:  MR BRAIN WAW IC                            PROCEDURE DATE:  2019            INTERPRETATION:  INDICATIONS:  63M w/ hx pituitary adenoma s/p resection   w/ worsening dizziness/unsteadiness    TECHNIQUE:  Multiplanar imaging was performedusing T1 weighted, T2   weighted and FLAIR sequences.  Diffusion weighted and SWI images were   obtained.        COMPARISON EXAMINATION:  CT 2019    FINDINGS:    Ventricles and sulci: Asymmetric appearance to the ventricles with the   left beinglarger than the right which appears to be on a congenital   basis. No evidence of obstruction  Intra-axial: Pituitary stalk is deviated to the left. There is residual   soft tissue in the right aspect of the sella which may relate to residual   normal gland however, no pre-op evaluation is available for comparison.   Alternatively, this may represent recurrent disease given history of   prior pituitary surgery for adenoma.  Extra-axial:  No mass or collection.  Visualized sinuses: Postop changes at the level of the sphenoid sinuses  Visualized mastoids:  Normal.  Calvarium:  Normal.  Carotid flow voids:  Present.    Miscellaneous:  None.    IMPRESSION:  Postop changes at the level of the sphenoid sinus. No   evidence of acute infarct. No evidence of intracranial hemorrhage.   Pituitary stalk is deviated to the left with residual soft tissue in the   right aspect of the sella which may represent the patient's normal   pituitary gland or recurrent disease in a patient with a history of   adenoma. No preop evaluation is available for comparison                      NILA BLANCO M.D., ATTENDING RADIOLOGIST  This document has been electronically signed. 2019 12:38PM                < end of copied text >            < end of copied text >

## 2019-06-19 NOTE — PROGRESS NOTE ADULT - SUBJECTIVE AND OBJECTIVE BOX
no  cp/sob    REVIEW OF SYSTEMS:  GEN: no fever,    no chills  RESP: no SOB,   no cough  CVS: no chest pain,   no palpitations  GI: no abdominal pain,   no nausea,   no vomiting,   no constipation,   no diarrhea  : no dysuria,   no frequency  NEURO: no headache,   no dizziness  PSYCH: no depression,   not anxious  Derm : no rash    MEDICATIONS  (STANDING):  citalopram 20 milliGRAM(s) Oral daily  desmopressin 0.01% Nasal 1 Spray(s) Nasal daily  heparin  Injectable 5000 Unit(s) SubCutaneous every 12 hours  pantoprazole    Tablet 40 milliGRAM(s) Oral before breakfast  sodium chloride 0.9%. 1000 milliLiter(s) (75 mL/Hr) IV Continuous <Continuous>    MEDICATIONS  (PRN):      Vital Signs Last 24 Hrs  T(C): 36.6 (2019 04:33), Max: 37.1 (2019 20:49)  T(F): 97.8 (2019 04:33), Max: 98.7 (2019 20:49)  HR: 62 (2019 04:33) (55 - 63)  BP: 98/60 (2019 04:33) (97/55 - 121/68)  BP(mean): --  RR: 18 (2019 04:33) (17 - 18)  SpO2: 99% (2019 04:33) (96% - 100%)  CAPILLARY BLOOD GLUCOSE        I&O's Summary    2019 07:01  -  2019 07:00  --------------------------------------------------------  IN: 1480 mL / OUT: 1050 mL / NET: 430 mL        PHYSICAL EXAM:  HEAD:  Atraumatic, Normocephalic  NECK: Supple, No   JVD  CHEST/LUNG:   no     rales,     no,    rhonchi  HEART: Regular rate and rhythm;         murmur  ABDOMEN: Soft, Nontender, ;   EXTREMITIES:    no    edema  NEUROLOGY:  alert    LABS:                        15.1   7.8   )-----------( 185      ( 2019 07:01 )             45.3     06-18    141  |  105  |  18  ----------------------------<  92  4.2   |  26  |  1.21    Ca    9.6      2019 07:01  Phos  2.0       Mg     2.3         TPro  7.3  /  Alb  4.5  /  TBili  0.5  /  DBili  x   /  AST  24  /  ALT  30  /  AlkPhos  64            Urinalysis Basic - ( 2019 14:57 )    Color: Colorless / Appearance: Clear / S.006 / pH: x  Gluc: x / Ketone: Negative  / Bili: Negative / Urobili: Negative   Blood: x / Protein: Negative / Nitrite: Negative   Leuk Esterase: Negative / RBC: x / WBC x   Sq Epi: x / Non Sq Epi: x / Bacteria: x              Thyroid Stimulating Hormone, Serum: 2.36 uIU/mL ( @ 18:14)          Consultant(s) Notes Reviewed:      Care Discussed with Consultants/Other Providers:

## 2019-06-19 NOTE — CONSULT NOTE ADULT - ASSESSMENT
62 y/o post 3 operations for pituitary tumor due to recurrence, residual DI,  admitted with dizziness.    Patient post three pituitary tumor surgeries, last at 2007 with Dr. Santos. No evidence of recurrence since. Patient with residual DI, however other pituitary functions are normal, not on any other replacement. Followed by Dr. Adams last seen December 2018.   Patient admitted with recurrent episodes of dizziness that happen while in bed, if he turns his head. This results in immediate dizziness, sometimes with cold sweat. It lasts for a few minutes to half an hour. No N/V, no recent weight loss, had a recent cold with no fever and clear sputum.   He had a "bad cold" this winter with bronchitis, and responded well to steroids.   Here noted with mild orthostatic changes, and low late am cortisol- 4.9.    Patient was seen by neurology today, impression of peripheral vertigo, given prednisone 40 mg, which till now, 3 hours later did not improve his symptoms..  Seen by NS- ELLE.    Patient post pituitary surgeryX3, however he had normal pituitary function except for DI till now.   His symptoms are suggestive of peripheral vertigo rather then pituitary dysfunction, as they occur in bed, when turning his head.   He has mild orthostatics and lower set am cortisol. however his complaints are not long standing, suggesting pituitary dysfunction.  Neurology and NS notes appreciated, as the patient was already treated with prednisone 40 mg for possible vertigo will not be able to asses HPA axis at this time, of note hhe did not have a remarkable response to the prednisone so far.     Suggest:  Will check prolactin, LH testosterone, T4, Free T4 to evaluate pituitary.  Continue DDAVP 0.1 at bedtime only, pt. advised not to use more!.  Expect to find normal anterior pituitary function.

## 2019-06-19 NOTE — PROGRESS NOTE ADULT - ASSESSMENT
This is a 63y year old Male with the below past medical history who presents with the chief complaint of dizziness, getting worse and more frequent in past months maybe 1 year to 1.5 years. patient has hx of pituitary adenoma resected 1991 then 2 revisions 1997 and 2007, has had BPV episodes since then manageable and short lived , has meclizine prn, follows with ENT. but lately more frequent, unable to have a normal day, suddenly an episode hits him and he feels nauseous and wiht any movement has vomiting. denies room spinning sensation this time as in the past, just lightheaded and left head dull ache/ pressure feeling. no tinnitus , no falls, no vision changes. ate breakfast this morning ok, has been OOb and walking ok. inbetween episodes normal. has diabetes insipidus since surgeries so has to be cautious how much water intake he has. now in hospital found to be orthostatic hypotensive getting IVFs. afebrile denies new medications or recent illness. has hx of anxiety takes citalopram. last mri brain was 1 year ago spouse brining in CD. ct head on admission stable. some soft tissue noted in surgical area unclear if more or from post op changes. going for MRI/A today. 	patient seen by my associates Dr Curran and Dr Canseco in past.  Patient was referred from ENT back to Dr Curran    normal nonfocal neurological exam , still with intermittent sx  recc-orthostatic hypotension symptomatic + getting IVFs  -endo management to avoid dehydration in setting of diabetes insipidus from now on  -ct head reviewed, unremarkable findings  -MRI neg for stroke  -symptoms and patient description by history suspicious for peripheral vertigo more than central type. also orthostatic hypotension.  -vestibular rehab once as outpt, we can arrange once d/c  -dalton prn nausea/vomiting  -can give short course of steroids for his vertigo as well  -OOB as tolerated  d/w pt, NP,   no neuro c/i to d/c

## 2019-06-19 NOTE — PROVIDER CONTACT NOTE (OTHER) - ASSESSMENT
Patient fast asleep,easily arousable.Denies dizziness,chest discomfort.Vitals BP-121/68,heart rate 55/mt,O 2 sat -99% at room air.Baseline Tele is Sinus Ron 55.

## 2019-06-19 NOTE — PROGRESS NOTE ADULT - ASSESSMENT
63M w/   hx of pituitary adenoma s/p resection and two separate revisions /  subsequent diabetes insipidus on DDAVP   presents with two days of progressively worsening dizziness and unsteadiness     echo  ct head,  sellar  soft tissue  N/S eval noted, no intervention  iv fluids/  sbp  in 90's,  pt not orthostatic  follow orthostatic today/  on iv fluids  for  low  sbp  cortisol level  PT  eval  DVT prophylaxis  mri head, no  cva     < from: MR Head w/wo IV Cont (06.18.19 @ 12:10) >  MPRESSION:  Postop changes at the level of the sphenoid sinus. No   evidence of acute infarct. No evidence of intracranial hemorrhage.   Pituitary stalk is deviated to the left with residual soft tissue in the   right aspect of the sella which may represent the patient's normal   pituitary gland or recurrent disease in a patient with a history of   adenoma. No preop evaluation is available for comparison  < end of copied text >     < from: CT Head No Cont (06.17.19 @ 17:02) >  IMPRESSION: No prior imaging available for comparison. Asymmetric   ventricles with slightly enlarged left lateral ventricle compared with   the right which may be anatomic variation. No intraparenchymal masses.   Sellar soft tissue may represent residual neoplasm versus postoperative   changes. No large sellar mass identified.  < end of copied text > 63M w/   hx of pituitary adenoma s/p resection and two separate revisions /  subsequent diabetes insipidus on DDAVP   presents with two days of progressively worsening dizziness and unsteadiness     echo  ct head,  sellar  soft tissue  N/S eval noted, no intervention  iv fluids/  sbp  in 90's,  pt not orthostatic  follow orthostatic today/  on iv fluids  for  low  sbp  cortisol level low/  endo eval  dr wilkerson  PT  eval  DVT prophylaxis  mri head, no  cva/   steroid  per  neuro  today     < from: MR Head w/wo IV Cont (06.18.19 @ 12:10) >  MPRESSION:  Postop changes at the level of the sphenoid sinus. No   evidence of acute infarct. No evidence of intracranial hemorrhage.   Pituitary stalk is deviated to the left with residual soft tissue in the   right aspect of the sella which may represent the patient's normal   pituitary gland or recurrent disease in a patient with a history of   adenoma. No preop evaluation is available for comparison  < end of copied text >     < from: CT Head No Cont (06.17.19 @ 17:02) >  IMPRESSION: No prior imaging available for comparison. Asymmetric   ventricles with slightly enlarged left lateral ventricle compared with   the right which may be anatomic variation. No intraparenchymal masses.   Sellar soft tissue may represent residual neoplasm versus postoperative   changes. No large sellar mass identified.  < end of copied text >

## 2019-06-20 ENCOUNTER — TRANSCRIPTION ENCOUNTER (OUTPATIENT)
Age: 64
End: 2019-06-20

## 2019-06-20 VITALS
DIASTOLIC BLOOD PRESSURE: 77 MMHG | OXYGEN SATURATION: 98 % | TEMPERATURE: 98 F | RESPIRATION RATE: 17 BRPM | HEART RATE: 75 BPM | SYSTOLIC BLOOD PRESSURE: 123 MMHG

## 2019-06-20 LAB
LH SERPL-ACNC: 3.6 IU/L — SIGNIFICANT CHANGE UP
PROLACTIN SERPL-MCNC: 8.1 NG/ML — SIGNIFICANT CHANGE UP (ref 4.1–18.4)
T4 AB SER-ACNC: 5.3 UG/DL — SIGNIFICANT CHANGE UP (ref 4.6–12)
T4 FREE SERPL-MCNC: 0.9 NG/DL — SIGNIFICANT CHANGE UP (ref 0.9–1.8)
TESTOST FREE+TOTAL PANEL SERPL-MCNC: 441 NG/DL — SIGNIFICANT CHANGE UP (ref 193–740)

## 2019-06-20 PROCEDURE — 70553 MRI BRAIN STEM W/O & W/DYE: CPT

## 2019-06-20 PROCEDURE — 84436 ASSAY OF TOTAL THYROXINE: CPT

## 2019-06-20 PROCEDURE — 84439 ASSAY OF FREE THYROXINE: CPT

## 2019-06-20 PROCEDURE — 85027 COMPLETE CBC AUTOMATED: CPT

## 2019-06-20 PROCEDURE — 82607 VITAMIN B-12: CPT

## 2019-06-20 PROCEDURE — 83735 ASSAY OF MAGNESIUM: CPT

## 2019-06-20 PROCEDURE — 81003 URINALYSIS AUTO W/O SCOPE: CPT

## 2019-06-20 PROCEDURE — 97162 PT EVAL MOD COMPLEX 30 MIN: CPT

## 2019-06-20 PROCEDURE — 84300 ASSAY OF URINE SODIUM: CPT

## 2019-06-20 PROCEDURE — 84146 ASSAY OF PROLACTIN: CPT

## 2019-06-20 PROCEDURE — 82533 TOTAL CORTISOL: CPT

## 2019-06-20 PROCEDURE — 93306 TTE W/DOPPLER COMPLETE: CPT

## 2019-06-20 PROCEDURE — 80053 COMPREHEN METABOLIC PANEL: CPT

## 2019-06-20 PROCEDURE — 82570 ASSAY OF URINE CREATININE: CPT

## 2019-06-20 PROCEDURE — A9585: CPT

## 2019-06-20 PROCEDURE — 84100 ASSAY OF PHOSPHORUS: CPT

## 2019-06-20 PROCEDURE — 82436 ASSAY OF URINE CHLORIDE: CPT

## 2019-06-20 PROCEDURE — 83002 ASSAY OF GONADOTROPIN (LH): CPT

## 2019-06-20 PROCEDURE — 84403 ASSAY OF TOTAL TESTOSTERONE: CPT

## 2019-06-20 PROCEDURE — 84443 ASSAY THYROID STIM HORMONE: CPT

## 2019-06-20 PROCEDURE — 83935 ASSAY OF URINE OSMOLALITY: CPT

## 2019-06-20 PROCEDURE — 86803 HEPATITIS C AB TEST: CPT

## 2019-06-20 PROCEDURE — 70450 CT HEAD/BRAIN W/O DYE: CPT

## 2019-06-20 PROCEDURE — 93306 TTE W/DOPPLER COMPLETE: CPT | Mod: 26

## 2019-06-20 PROCEDURE — 83930 ASSAY OF BLOOD OSMOLALITY: CPT

## 2019-06-20 PROCEDURE — 93005 ELECTROCARDIOGRAM TRACING: CPT

## 2019-06-20 PROCEDURE — 99285 EMERGENCY DEPT VISIT HI MDM: CPT

## 2019-06-20 RX ORDER — DESMOPRESSIN ACETATE 0.1 MG/1
0 TABLET ORAL
Qty: 0 | Refills: 0 | DISCHARGE

## 2019-06-20 RX ORDER — DESMOPRESSIN ACETATE 0.1 MG/1
1 TABLET ORAL
Qty: 0 | Refills: 0 | DISCHARGE
Start: 2019-06-20

## 2019-06-20 RX ORDER — CITALOPRAM 10 MG/1
1 TABLET, FILM COATED ORAL
Qty: 0 | Refills: 0 | DISCHARGE
Start: 2019-06-20

## 2019-06-20 RX ORDER — CITALOPRAM 10 MG/1
0 TABLET, FILM COATED ORAL
Qty: 0 | Refills: 0 | DISCHARGE

## 2019-06-20 RX ADMIN — PANTOPRAZOLE SODIUM 40 MILLIGRAM(S): 20 TABLET, DELAYED RELEASE ORAL at 06:05

## 2019-06-20 RX ADMIN — DESMOPRESSIN ACETATE 1 SPRAY(S): 0.1 TABLET ORAL at 17:18

## 2019-06-20 RX ADMIN — CITALOPRAM 20 MILLIGRAM(S): 10 TABLET, FILM COATED ORAL at 09:42

## 2019-06-20 RX ADMIN — Medication 40 MILLIGRAM(S): at 06:05

## 2019-06-20 RX ADMIN — HEPARIN SODIUM 5000 UNIT(S): 5000 INJECTION INTRAVENOUS; SUBCUTANEOUS at 09:42

## 2019-06-20 NOTE — ED ADULT NURSE NOTE - NEURO GAIT
steady admit to telemetry   trend cardiac enzymes and EKG  Low Na diet and fluid restriction   monitor I&Os   TTE  Lasix 40mg BID monitor I&Os, daily weights, TTE, consider Lasix

## 2019-06-20 NOTE — PROGRESS NOTE ADULT - ASSESSMENT
Discharge to home later today after TTE completed. Trial of prednisone taper. Vestibular training next week in Neurology office. See med list and summary.

## 2019-06-20 NOTE — PROGRESS NOTE ADULT - SUBJECTIVE AND OBJECTIVE BOX
INTERVAL HPI/OVERNIGHT EVENTS:  Pt seen and examined at bedside.     Allergies/Intolerance: No Known Allergies      MEDICATIONS  (STANDING):  citalopram 20 milliGRAM(s) Oral daily  desmopressin 0.01% Nasal 1 Spray(s) Nasal daily  heparin  Injectable 5000 Unit(s) SubCutaneous every 12 hours  pantoprazole    Tablet 40 milliGRAM(s) Oral before breakfast  predniSONE   Tablet 40 milliGRAM(s) Oral daily    MEDICATIONS  (PRN):  ondansetron Injectable 4 milliGRAM(s) IV Push every 8 hours PRN Nausea and/or Vomiting        ROS: all systems reviewed and wnl      PHYSICAL EXAMINATION:  Vital Signs Last 24 Hrs  T(C): 36.6 (20 Jun 2019 04:42), Max: 36.8 (19 Jun 2019 21:27)  T(F): 97.8 (20 Jun 2019 04:42), Max: 98.2 (19 Jun 2019 21:27)  HR: 64 (20 Jun 2019 06:03) (60 - 69)  BP: 117/75 (20 Jun 2019 06:03) (94/56 - 138/73)  BP(mean): --  RR: 18 (20 Jun 2019 06:03) (18 - 20)  SpO2: 97% (20 Jun 2019 06:03) (97% - 99%)  CAPILLARY BLOOD GLUCOSE          06-19 @ 07:01  -  06-20 @ 07:00  --------------------------------------------------------  IN: 360 mL / OUT: 825 mL / NET: -465 mL        GENERAL:   NECK: supple, No JVD  CHEST/LUNG: clear to auscultation bilaterally; no rales, rhonchi, or wheezing b/l  HEART: normal S1, S2  ABDOMEN: BS+, soft, ND, NT   EXTREMITIES:  pulses palpable; no clubbing, cyanosis, or edema b/l LEs  SKIN: no rashes or lesions      LABS: INTERVAL HPI/OVERNIGHT EVENTS:  Pt seen and examined at bedside.     Allergies/Intolerance: No Known Allergies      MEDICATIONS  (STANDING):  citalopram 20 milliGRAM(s) Oral daily  desmopressin 0.01% Nasal 1 Spray(s) Nasal daily  heparin  Injectable 5000 Unit(s) SubCutaneous every 12 hours  pantoprazole    Tablet 40 milliGRAM(s) Oral before breakfast  predniSONE   Tablet 40 milliGRAM(s) Oral daily    MEDICATIONS  (PRN):  ondansetron Injectable 4 milliGRAM(s) IV Push every 8 hours PRN Nausea and/or Vomiting        ROS: all systems reviewed and wnl      PHYSICAL EXAMINATION:  Vital Signs Last 24 Hrs  T(C): 36.6 (20 Jun 2019 04:42), Max: 36.8 (19 Jun 2019 21:27)  T(F): 97.8 (20 Jun 2019 04:42), Max: 98.2 (19 Jun 2019 21:27)  HR: 64 (20 Jun 2019 06:03) (60 - 69)  BP: 117/75 (20 Jun 2019 06:03) (94/56 - 138/73)  BP(mean): --  RR: 18 (20 Jun 2019 06:03) (18 - 20)  SpO2: 97% (20 Jun 2019 06:03) (97% - 99%)  CAPILLARY BLOOD GLUCOSE          06-19 @ 07:01  -  06-20 @ 07:00  --------------------------------------------------------  IN: 360 mL / OUT: 825 mL / NET: -465 mL        GENERAL: stable, still episodes of dizziness  NECK: supple, No JVD  CHEST/LUNG: clear to auscultation bilaterally; no rales, rhonchi, or wheezing b/l  HEART: normal S1, S2  ABDOMEN: BS+, soft, ND, NT   EXTREMITIES:  pulses palpable; no clubbing, cyanosis, or edema b/l LEs  SKIN: no rashes or lesions      LABS:

## 2019-06-20 NOTE — PROVIDER CONTACT NOTE (OTHER) - ASSESSMENT
Patient is A&O x 4, denies nausea, vomiting, is diaphoretic. Vitals: /76, Pulse 79, O2 99%, RR 18, Temp 97.8.

## 2019-06-20 NOTE — PHYSICAL THERAPY INITIAL EVALUATION ADULT - PLANNED THERAPY INTERVENTIONS, PT EVAL
Stair Negotiation Training: GOAL- Patient will be able to negotiate up & down 1 flight of stairs independently with single railing, step to gait pattern, in 1-2 weeks./gait training/balance training

## 2019-06-20 NOTE — PROGRESS NOTE ADULT - PROVIDER SPECIALTY LIST ADULT
Cardiology
Cardiology
Hospitalist
Internal Medicine
Internal Medicine
Neurology
Neurology
Cardiology

## 2019-06-20 NOTE — PROGRESS NOTE ADULT - ASSESSMENT
This is a 63y year old Male with the below past medical history who presents with the chief complaint of dizziness, getting worse and more frequent in past months maybe 1 year to 1.5 years. patient has hx of pituitary adenoma resected 1991 then 2 revisions 1997 and 2007, has had BPV episodes since then manageable and short lived , has meclizine prn, follows with ENT. but lately more frequent, unable to have a normal day, suddenly an episode hits him and he feels nauseous and wiht any movement has vomiting. denies room spinning sensation this time as in the past, just lightheaded and left head dull ache/ pressure feeling. no tinnitus , no falls, no vision changes. ate breakfast this morning ok, has been OOb and walking ok. inbetween episodes normal. has diabetes insipidus since surgeries so has to be cautious how much water intake he has. now in hospital found to be orthostatic hypotensive getting IVFs. afebrile denies new medications or recent illness. has hx of anxiety takes citalopram. last mri brain was 1 year ago spouse brining in CD. ct head on admission stable. some soft tissue noted in surgical area unclear if more or from post op changes. going for MRI/A today. 	patient seen by my associates Dr Curran and Dr Canseco in past.  Patient was referred from ENT back to Dr Curran    normal nonfocal neurological exam , still with intermittent sx  recc-orthostatic hypotension symptomatic + getting IVFs  -endo management to avoid dehydration in setting of diabetes insipidus from now on  -ct head reviewed, unremarkable findings  -MRI neg for stroke  -symptoms and patient description by history suspicious for peripheral vertigo more than central type. also orthostatic hypotension.  -vestibular rehab once as outpt, we can arrange once d/c  -dalton prn nausea/vomiting  -can give short course of steroids for his vertigo as well. appreciated limitations from dr wilkerson  -STEPHAN as tolerated  d/w pt, NP,   no neuro c/i to d/c    d/w pt and dr martin

## 2019-06-20 NOTE — DISCHARGE NOTE NURSING/CASE MANAGEMENT/SOCIAL WORK - NSDCDPATPORTLINK_GEN_ALL_CORE
You can access the [facility] Portal, offered by [facility], by registering with the following website: [email or web address]

## 2019-06-20 NOTE — PROGRESS NOTE ADULT - SUBJECTIVE AND OBJECTIVE BOX
Admitting Diagnosis:  Dizziness and giddiness (R42): DIZZINESS AND GIDDINESS      HPI:  This is a 63y year old Male with the below past medical history who presents with the chief complaint of dizziness, getting worse and more frequent in past months maybe 1 year to 1.5 years. patient has hx of pituitary adenoma resected 1991 then 2 revisions 1997 and 2007, has had BPV episodes since then manageable and short lived , has meclizine prn, follows with ENT. but lately more frequent, unable to have a normal day, suddenly an episode hits him and he feels nauseous and wiht any movement has vomiting. denies room spinning sensation this time as in the past, just lightheaded and left head dull ache/ pressure feeling. no tinnitus , no falls, no vision changes. ate breakfast this morning ok, has been OOb and walking ok. inbetween episodes normal. has diabetes insipidus since surgeries so has to be cautious how much water intake he has. now in hospital found to be orthostatic hypotensive getting IVFs. afebrile denies new medications or recent illness. has hx of anxiety takes citalopram. last mri brain was 1 year ago spouse brining in CD. ct head on admission stable. some soft tissue noted in surgical area unclear if more or from post op changes. 	patient seen by my associates Dr Curran and Dr Canseco in past.  was referred from ENT back to Dr Curran, was supposed to have appt with him.      had some brief vertigo today and yesterday  not always with positional changes    Past Medical History:  Diabetes insipidus (E23.2)  Pituitary adenoma (D35.2)      Past Surgical History:  Adenoma of pituitary (D35.2)      Social History:  No toxic habits    Family History:  FAMILY HISTORY:  No pertinent family history in first degree relatives      Allergies:  No Known Allergies      ROS:  Constitutional: Patient offers no complaints of fevers or significant weight loss  Ears, Nose, Mouth and Throat: The patient presents with no abnormalities of the head, ears, eyes, nose or throat  Skin: Patient offers no concerns of new rashes or lesions  Respiratory: The patient presents with no abnormalities of the respiratory tract  Cardiovascular: The patient presents with no cardiac abnormalities  Gastrointestinal: The patient presents with no abnormalities of the GI system  Genitourinary: The patient presents with no dysuria, hematuria or frequent urination  Neurological: See HPI  Endocrine: Patient offers no complaints of excessive thirst, urination, or heat/cold intolerance    Advanced care planning reviewed and noted in the chart.  Medications:  citalopram 20 milliGRAM(s) Oral daily  desmopressin 0.01% Nasal 1 Spray(s) Nasal daily  heparin  Injectable 5000 Unit(s) SubCutaneous every 12 hours  ondansetron Injectable 4 milliGRAM(s) IV Push every 8 hours PRN  pantoprazole    Tablet 40 milliGRAM(s) Oral before breakfast  predniSONE   Tablet 40 milliGRAM(s) Oral daily      Labs:          CAPILLARY BLOOD GLUCOSE                  Vitals:  Vital Signs Last 24 Hrs  T(C): 36.6 (20 Jun 2019 04:42), Max: 36.8 (19 Jun 2019 21:27)  T(F): 97.8 (20 Jun 2019 04:42), Max: 98.2 (19 Jun 2019 21:27)  HR: 64 (20 Jun 2019 06:03) (61 - 79)  BP: 117/75 (20 Jun 2019 06:03) (94/56 - 138/73)  BP(mean): --  RR: 18 (20 Jun 2019 06:03) (18 - 20)  SpO2: 97% (20 Jun 2019 06:03) (97% - 99%)  NEUROLOGICAL EXAM:    Mental status: Awake, alert, and in no apparent distress. Oriented to person, place and time. Language function is normal. Recent memory, digit span and concentration were normal.     Cranial Nerves: Pupils were equal, round, reactive to light. Extraocular movements were intact. Visual field were full. Fundoscopic exam was deferred. Facial sensation was intact to light touch. There was no facial asymmetry. The palate was upgoing symmetrically and tongue was midline. Hearing acuity was intact to finger rub AU. Shoulder shrug was full bilaterally    Motor exam: Bulk and tone were normal. Strength was 5/5 in all four extremities. Fine finger movements were symmetric and normal. There was no pronator drift    Reflexes: 2+ in the bilateral upper extremities. 2+ in the bilateral lower extremities. Toes were downgoing bilaterally.     Sensation: Intact to light touch, temperature, vibration and proprioception.     Coordination: Finger-nose-finger and heel-to-shin was without dysmetria. subtle end target tremor L hand    Gait: defer   < from: CT Head No Cont (06.17.19 @ 17:02) >  EXAM:  CT BRAIN                            PROCEDURE DATE:  06/17/2019            INTERPRETATION:    CLINICAL INDICATION: Progressive ataxia for 2 days with nausea, prior to   pituitary adenoma surgery    5mm axial sections of the brain were obtained from base to vertex,   without the intravenous administration of contrast material. Coronal and   sagittal computer generated reconstructed views are available    No prior brain imaging is available for comparison.    There is mild atrophy for the patient's age. The left lateral ventricle   is larger than the right which may be due to normal anatomic variation.   There is no evidence of transependymal flow. No hemorrhage or mass is   identified. There is no large sellar mass extending into the suprasellar   region. A small amount of soft tissue in the sella is identified   measuring 8.1 mm in craniocaudal diameter which could represent   postoperative changes versus residual neoplasm. There is no chiasm   compression. There is left sinus opacification.     IMPRESSION: No prior imaging available for comparison. Asymmetric   ventricles with slightly enlarged left lateral ventricle compared with   the right which may be anatomic variation. No intraparenchymal masses.   Sellar soft tissue may represent residual neoplasm versus postoperative   changes. No large sellar mass identified.                    MAN STEINBERG M.D., ATTENDING RADIOLOGIST  This document has been electronically signed. Jun 17 2019  5:07PM      < from: MR Head w/wo IV Cont (06.18.19 @ 12:10) >    EXAM:  MR BRAIN WAW IC                            PROCEDURE DATE:  06/18/2019            INTERPRETATION:  INDICATIONS:  63M w/ hx pituitary adenoma s/p resection   w/ worsening dizziness/unsteadiness    TECHNIQUE:  Multiplanar imaging was performedusing T1 weighted, T2   weighted and FLAIR sequences.  Diffusion weighted and SWI images were   obtained.        COMPARISON EXAMINATION:  CT 6/17/2019    FINDINGS:    Ventricles and sulci: Asymmetric appearance to the ventricles with the   left beinglarger than the right which appears to be on a congenital   basis. No evidence of obstruction  Intra-axial: Pituitary stalk is deviated to the left. There is residual   soft tissue in the right aspect of the sella which may relate to residual   normal gland however, no pre-op evaluation is available for comparison.   Alternatively, this may represent recurrent disease given history of   prior pituitary surgery for adenoma.  Extra-axial:  No mass or collection.  Visualized sinuses: Postop changes at the level of the sphenoid sinuses  Visualized mastoids:  Normal.  Calvarium:  Normal.  Carotid flow voids:  Present.    Miscellaneous:  None.    IMPRESSION:  Postop changes at the level of the sphenoid sinus. No   evidence of acute infarct. No evidence of intracranial hemorrhage.   Pituitary stalk is deviated to the left with residual soft tissue in the   right aspect of the sella which may represent the patient's normal   pituitary gland or recurrent disease in a patient with a history of   adenoma. No preop evaluation is available for comparison                      NILA BLANCO M.D., ATTENDING RADIOLOGIST  This document has been electronically signed. Jun 18 2019 12:38PM                < end of copied text >            < end of copied text >

## 2019-06-20 NOTE — PROGRESS NOTE ADULT - SUBJECTIVE AND OBJECTIVE BOX
CARDIOLOGY     PROGRESS  NOTE   ________________________________________________    CHIEF COMPLAINT:Patient is a 63y old  Male who presents with a chief complaint of dizziness (19 Jun 2019 17:49)  no chest pain, + dizziness.  	  REVIEW OF SYSTEMS:  CONSTITUTIONAL: No fever, weight loss, or fatigue  EYES: No eye pain, visual disturbances, or discharge  ENT:  No difficulty hearing, tinnitus, vertigo; No sinus or throat pain  NECK: No pain or stiffness  RESPIRATORY: No cough, wheezing, chills or hemoptysis; No Shortness of Breath  CARDIOVASCULAR: No chest pain, palpitations, passing out, dizziness, or leg swelling  GASTROINTESTINAL: No abdominal or epigastric pain. No nausea, vomiting, or hematemesis; No diarrhea or constipation. No melena or hematochezia.  GENITOURINARY: No dysuria, frequency, hematuria, or incontinence  NEUROLOGICAL: No headaches, memory loss, loss of strength, numbness, or tremors  SKIN: No itching, burning, rashes, or lesions   LYMPH Nodes: No enlarged glands  ENDOCRINE: No heat or cold intolerance; No hair loss  MUSCULOSKELETAL: No joint pain or swelling; No muscle, back, or extremity pain  PSYCHIATRIC: No depression, anxiety, mood swings, or difficulty sleeping  HEME/LYMPH: No easy bruising, or bleeding gums  ALLERGY AND IMMUNOLOGIC: No hives or eczema	    [ ] All others negative	  [ ] Unable to obtain    PHYSICAL EXAM:  T(C): 36.6 (06-20-19 @ 04:42), Max: 36.8 (06-19-19 @ 21:27)  HR: 64 (06-20-19 @ 06:03) (60 - 69)  BP: 117/75 (06-20-19 @ 06:03) (94/56 - 138/73)  RR: 18 (06-20-19 @ 06:03) (18 - 20)  SpO2: 97% (06-20-19 @ 06:03) (97% - 99%)  Wt(kg): --  I&O's Summary    18 Jun 2019 07:01  -  19 Jun 2019 07:00  --------------------------------------------------------  IN: 1480 mL / OUT: 1050 mL / NET: 430 mL    19 Jun 2019 07:01  -  20 Jun 2019 06:44  --------------------------------------------------------  IN: 360 mL / OUT: 825 mL / NET: -465 mL        Appearance: Normal	  HEENT:   Normal oral mucosa, PERRL, EOMI	  Lymphatic: No lymphadenopathy  Cardiovascular: Normal S1 S2, No JVD, + murmurs, No edema  Respiratory: Lungs clear to auscultation	  Psychiatry: A & O x 3, Mood & affect appropriate  Gastrointestinal:  Soft, Non-tender, + BS	  Skin: No rashes, No ecchymoses, No cyanosis	  Neurologic: Non-focal  Extremities: Normal range of motion, No clubbing, cyanosis or edema  Vascular: Peripheral pulses palpable 2+ bilaterally    MEDICATIONS  (STANDING):  citalopram 20 milliGRAM(s) Oral daily  desmopressin 0.01% Nasal 1 Spray(s) Nasal daily  heparin  Injectable 5000 Unit(s) SubCutaneous every 12 hours  pantoprazole    Tablet 40 milliGRAM(s) Oral before breakfast  predniSONE   Tablet 40 milliGRAM(s) Oral daily      TELEMETRY: 	    ECG:  	  RADIOLOGY:  OTHER: 	  	  LABS:	 	    CARDIAC MARKERS:                                15.1   7.8   )-----------( 185      ( 18 Jun 2019 07:01 )             45.3     06-18    141  |  105  |  18  ----------------------------<  92  4.2   |  26  |  1.21    Ca    9.6      18 Jun 2019 07:01    TPro  7.3  /  Alb  4.5  /  TBili  0.5  /  DBili  x   /  AST  24  /  ALT  30  /  AlkPhos  64  06-18    proBNP:   Lipid Profile:   HgA1c:   TSH: Thyroid Stimulating Hormone, Serum: 2.36 uIU/mL (06-17 @ 18:14)          Assessment and plan  ---------------------------  no further bradycardia  dizziness doubt cardiac  may consider ENT eval  echo  dvt prophylaxis  endocrine noted

## 2019-06-20 NOTE — DISCHARGE NOTE PROVIDER - HOSPITAL COURSE
63M w/ hx of pituitary adenoma s/p resection and two separate revisions with subsequent diabetes insipidus on DDAVP daily presents with two days of progressively worsening dizziness and unsteadiness on his feet. He denies room spinning. The dizziness is accompanied by nausea no vomiting. Pt states he has been urinating much more than normal despite normal dose of DDAVP.    He had a normal MRI head. He was seen by neurology, card, Neurosurgery, Endocrine and PT -- who recomm vestibular Rehab. He was started with prednisone. An echo shows normal.     He is hemodynamically stable to be discharged home today.

## 2019-06-20 NOTE — DISCHARGE NOTE PROVIDER - NSDCCPCAREPLAN_GEN_ALL_CORE_FT
PRINCIPAL DISCHARGE DIAGNOSIS  Diagnosis: Peripheral vertigo  Assessment and Plan of Treatment: stable, cont Prednisone as prescribed, follow up with neurologist and vestibular rehab      SECONDARY DISCHARGE DIAGNOSES  Diagnosis: Diabetes insipidus  Assessment and Plan of Treatment: stable, cont desmopressin

## 2019-06-20 NOTE — PROVIDER CONTACT NOTE (OTHER) - BACKGROUND
Patient presented with progressive worsening dizziness for 2 days, accompanied by nausea. Pt has history of pituitary adenoma.

## 2019-06-20 NOTE — PHYSICAL THERAPY INITIAL EVALUATION ADULT - PRECAUTIONS/LIMITATIONS, REHAB EVAL
Pt found to be orthostatic hypotensive getting IVFs. fall precautions/Pt found to be orthostatic hypotensive getting IVFs.

## 2019-06-20 NOTE — DISCHARGE NOTE PROVIDER - CARE PROVIDER_API CALL
Pan Booth)  Electrodiagnostic Medicine; Neurology  [address] [facility], [address]  [place] [address]  Phone: [phone]  Fax: [phone]  Follow Up Time:     Parker Villegas)  Internal Medicine  [address] [place] [address]  Phone: [phone]  Fax: [phone]  Follow Up Time:

## 2019-06-20 NOTE — PHYSICAL THERAPY INITIAL EVALUATION ADULT - PERTINENT HX OF CURRENT PROBLEM, REHAB EVAL
Pt is a 64 y/o Male w/ hx of pituitary adenoma s/p resection and two separate revisions with subsequent diabetes insipidus on DDAVP daily presents with two days of progressively worsening dizziness and unsteadiness on his feet. He denies room spinning. The dizziness is accompanied by nausea no vomiting. Pt states he has been urinating much more than normal despite normal dose of DDAVP.

## 2021-04-12 ENCOUNTER — INPATIENT (INPATIENT)
Facility: HOSPITAL | Age: 66
LOS: 3 days | Discharge: HOME HEALTH | DRG: 149 | End: 2021-04-16
Attending: INTERNAL MEDICINE | Admitting: INTERNAL MEDICINE
Payer: MEDICARE

## 2021-04-12 VITALS
TEMPERATURE: 98 F | RESPIRATION RATE: 17 BRPM | WEIGHT: 205.03 LBS | DIASTOLIC BLOOD PRESSURE: 66 MMHG | HEIGHT: 72 IN | OXYGEN SATURATION: 98 % | HEART RATE: 87 BPM | SYSTOLIC BLOOD PRESSURE: 134 MMHG

## 2021-04-12 DIAGNOSIS — D35.2 BENIGN NEOPLASM OF PITUITARY GLAND: Chronic | ICD-10-CM

## 2021-04-12 LAB
ALBUMIN SERPL ELPH-MCNC: 4 G/DL — SIGNIFICANT CHANGE UP (ref 3.3–5)
ALP SERPL-CCNC: 76 U/L — SIGNIFICANT CHANGE UP (ref 40–120)
ALT FLD-CCNC: 17 U/L — SIGNIFICANT CHANGE UP (ref 10–45)
ANION GAP SERPL CALC-SCNC: 11 MMOL/L — SIGNIFICANT CHANGE UP (ref 5–17)
APPEARANCE UR: CLEAR — SIGNIFICANT CHANGE UP
AST SERPL-CCNC: 19 U/L — SIGNIFICANT CHANGE UP (ref 10–40)
BACTERIA # UR AUTO: NEGATIVE — SIGNIFICANT CHANGE UP
BASOPHILS # BLD AUTO: 0.02 K/UL — SIGNIFICANT CHANGE UP (ref 0–0.2)
BASOPHILS NFR BLD AUTO: 0.2 % — SIGNIFICANT CHANGE UP (ref 0–2)
BILIRUB SERPL-MCNC: 0.6 MG/DL — SIGNIFICANT CHANGE UP (ref 0.2–1.2)
BILIRUB UR-MCNC: NEGATIVE — SIGNIFICANT CHANGE UP
BUN SERPL-MCNC: 22 MG/DL — SIGNIFICANT CHANGE UP (ref 7–23)
CALCIUM SERPL-MCNC: 8.9 MG/DL — SIGNIFICANT CHANGE UP (ref 8.4–10.5)
CHLORIDE SERPL-SCNC: 102 MMOL/L — SIGNIFICANT CHANGE UP (ref 96–108)
CO2 SERPL-SCNC: 23 MMOL/L — SIGNIFICANT CHANGE UP (ref 22–31)
COLOR SPEC: SIGNIFICANT CHANGE UP
CREAT SERPL-MCNC: 1.11 MG/DL — SIGNIFICANT CHANGE UP (ref 0.5–1.3)
DIFF PNL FLD: NEGATIVE — SIGNIFICANT CHANGE UP
EOSINOPHIL # BLD AUTO: 0.05 K/UL — SIGNIFICANT CHANGE UP (ref 0–0.5)
EOSINOPHIL NFR BLD AUTO: 0.6 % — SIGNIFICANT CHANGE UP (ref 0–6)
EPI CELLS # UR: 0 /HPF — SIGNIFICANT CHANGE UP
GLUCOSE SERPL-MCNC: 109 MG/DL — HIGH (ref 70–99)
GLUCOSE UR QL: NEGATIVE — SIGNIFICANT CHANGE UP
HCT VFR BLD CALC: 36.5 % — LOW (ref 39–50)
HGB BLD-MCNC: 12.2 G/DL — LOW (ref 13–17)
HYALINE CASTS # UR AUTO: 0 /LPF — SIGNIFICANT CHANGE UP (ref 0–2)
IMM GRANULOCYTES NFR BLD AUTO: 0.4 % — SIGNIFICANT CHANGE UP (ref 0–1.5)
KETONES UR-MCNC: NEGATIVE — SIGNIFICANT CHANGE UP
LEUKOCYTE ESTERASE UR-ACNC: NEGATIVE — SIGNIFICANT CHANGE UP
LYMPHOCYTES # BLD AUTO: 1.56 K/UL — SIGNIFICANT CHANGE UP (ref 1–3.3)
LYMPHOCYTES # BLD AUTO: 18.4 % — SIGNIFICANT CHANGE UP (ref 13–44)
MAGNESIUM SERPL-MCNC: 2.1 MG/DL — SIGNIFICANT CHANGE UP (ref 1.6–2.6)
MCHC RBC-ENTMCNC: 28.1 PG — SIGNIFICANT CHANGE UP (ref 27–34)
MCHC RBC-ENTMCNC: 33.4 GM/DL — SIGNIFICANT CHANGE UP (ref 32–36)
MCV RBC AUTO: 84.1 FL — SIGNIFICANT CHANGE UP (ref 80–100)
MONOCYTES # BLD AUTO: 0.8 K/UL — SIGNIFICANT CHANGE UP (ref 0–0.9)
MONOCYTES NFR BLD AUTO: 9.4 % — SIGNIFICANT CHANGE UP (ref 2–14)
NEUTROPHILS # BLD AUTO: 6.01 K/UL — SIGNIFICANT CHANGE UP (ref 1.8–7.4)
NEUTROPHILS NFR BLD AUTO: 71 % — SIGNIFICANT CHANGE UP (ref 43–77)
NITRITE UR-MCNC: NEGATIVE — SIGNIFICANT CHANGE UP
NRBC # BLD: 0 /100 WBCS — SIGNIFICANT CHANGE UP (ref 0–0)
NT-PROBNP SERPL-SCNC: 62 PG/ML — SIGNIFICANT CHANGE UP (ref 0–300)
PH UR: 6 — SIGNIFICANT CHANGE UP (ref 5–8)
PHOSPHATE SERPL-MCNC: 3.3 MG/DL — SIGNIFICANT CHANGE UP (ref 2.5–4.5)
PLATELET # BLD AUTO: 212 K/UL — SIGNIFICANT CHANGE UP (ref 150–400)
POTASSIUM SERPL-MCNC: 3.9 MMOL/L — SIGNIFICANT CHANGE UP (ref 3.5–5.3)
POTASSIUM SERPL-SCNC: 3.9 MMOL/L — SIGNIFICANT CHANGE UP (ref 3.5–5.3)
PROT SERPL-MCNC: 7.3 G/DL — SIGNIFICANT CHANGE UP (ref 6–8.3)
PROT UR-MCNC: SIGNIFICANT CHANGE UP
RBC # BLD: 4.34 M/UL — SIGNIFICANT CHANGE UP (ref 4.2–5.8)
RBC # FLD: 12.9 % — SIGNIFICANT CHANGE UP (ref 10.3–14.5)
RBC CASTS # UR COMP ASSIST: 1 /HPF — SIGNIFICANT CHANGE UP (ref 0–4)
SODIUM SERPL-SCNC: 136 MMOL/L — SIGNIFICANT CHANGE UP (ref 135–145)
SP GR SPEC: 1.02 — SIGNIFICANT CHANGE UP (ref 1.01–1.02)
TROPONIN T, HIGH SENSITIVITY RESULT: <6 NG/L — SIGNIFICANT CHANGE UP (ref 0–51)
UROBILINOGEN FLD QL: NEGATIVE — SIGNIFICANT CHANGE UP
WBC # BLD: 8.47 K/UL — SIGNIFICANT CHANGE UP (ref 3.8–10.5)
WBC # FLD AUTO: 8.47 K/UL — SIGNIFICANT CHANGE UP (ref 3.8–10.5)
WBC UR QL: 1 /HPF — SIGNIFICANT CHANGE UP (ref 0–5)

## 2021-04-12 PROCEDURE — G1004: CPT

## 2021-04-12 PROCEDURE — 99285 EMERGENCY DEPT VISIT HI MDM: CPT | Mod: CS,GC

## 2021-04-12 PROCEDURE — 70450 CT HEAD/BRAIN W/O DYE: CPT | Mod: 26,MG

## 2021-04-12 RX ORDER — DEXAMETHASONE 0.5 MG/5ML
6 ELIXIR ORAL ONCE
Refills: 0 | Status: COMPLETED | OUTPATIENT
Start: 2021-04-12 | End: 2021-04-12

## 2021-04-12 RX ADMIN — Medication 6 MILLIGRAM(S): at 23:26

## 2021-04-12 NOTE — ED PROVIDER NOTE - OBJECTIVE STATEMENT
65M PMH pituitary adenoma s/p resection s/p 2 revisions with subsequent diabetes insipidus, vertigo p/w multiple complaints. Primary complaint is episode of dizziness with n/v today. Pt states he gets intermittent episodes of vertigo, described as room spinning, triggered by eye movement or head turning (unsure which direction). Today had an episode of vertigo c/w prior episodes, however it lasted for 1hr, which is longer than usual. No falls/head trauma, not on AC. No visual changes, numbness, tingling, weakness.     Second complaint is sore throat and hoarse voice x 5 weeks. Follows with ENT Dr. Philip Perlman. Has been scoped 3 times, most recently last week, and was told everything looks normal. Has completed 2 courses of abx (Augmentin, Cefdinir) with persistent sx. Was never swabbed, but treated empirically. No fevers, but reports episodes of feeling "hot and cold."     Third complaint is SOB x 3 months. Worse with exertion. No cough. Had COVID 1 year ago. PCP recommended pulmonologist betina, has an appt scheduled for next month. Denies CP or palpitations, leg swelling/pain.     PCP Dr. Parker Villegas

## 2021-04-12 NOTE — ED PROVIDER NOTE - CLINICAL SUMMARY MEDICAL DECISION MAKING FREE TEXT BOX
Garry, PGY2 - 65M with pituitary adenoma s/p resection, vertigo, DI p/w multiple complaints. VSS, airway patent, CTAB, no respiratory distress, neuro exam normal.  1. Dizziness with n/v - c/w prior episodes of vertigo. Currently asymptomatic, last episode 6hrs prior to time of eval. Likely peripheral vertigo, low suspicion for central vertigo. Will obtain CTH  2. Sore throat - extensive w/u by ENT, most recently scoped 1 wk ago. Will treat symptomatically  3. SOB - unlikely cardiac in nature. Will obtain labs, ekg, cxr

## 2021-04-12 NOTE — ED PROVIDER NOTE - CARE PLAN
Principal Discharge DX:	Dizziness  Secondary Diagnosis:	Sore throat  Secondary Diagnosis:	Shortness of breath

## 2021-04-12 NOTE — ED ADULT NURSE NOTE - OBJECTIVE STATEMENT
64 yo male from home A&OX4 from home ambulatory on arrival c/o lightheadedness. Pt st had episode of dizziness that worsened "when I move my eyes" that is accompanied by nausea/vomiting. Pt has hx pituitary tumor that was mostly removed. Pt st has been having similar episodes since 1990. Pt also endorses sore throat w/ increasing hoarseness. Pt has been seen by EENT and scoped x 2 w/ completed course of antibiotics.  Pt endorses some SOB, not exacerbated by laying flat or by exertion, denies cough, chx pn, abd pn, LOC. VS stable. PERRL, neuro exam intact. No edema present, l/s clr equal bilat apices to bases. abd soft, nontender.

## 2021-04-12 NOTE — ED PROVIDER NOTE - PROGRESS NOTE DETAILS
Garry, PGY2 - Left callback number with PCP for admission Garry, PGY2 - Dr. Villegas states he admits to Dr. Navarro. Attempted to contact Dr. Navarro x1

## 2021-04-12 NOTE — ED ADULT NURSE REASSESSMENT NOTE - NS ED NURSE REASSESS COMMENT FT1
Pt resting in stretcher, a&ox3, nad, no increased wob noted, able to speak full sentences, skin warm and appropriate color, no voice changes or drooling noted. Pt reports throat discomfort but denies difficulty swallowing, drooling, throat swelling. Pending dispo Pt resting in stretcher, a&ox3, nad, no increased wob noted, able to speak full sentences, skin warm and appropriate color, no voice changes or drooling noted. Pt reports throat discomfort but denies difficulty swallowing, drooling, throat swelling, abdominal pain, n/v. Pending dispo

## 2021-04-12 NOTE — ED PROVIDER NOTE - ATTENDING CONTRIBUTION TO CARE
65 M w/ pituitary adenoma s/p resection in 1991/1997/2007 ([facility]) presents to the ER w/ 5 weeks of hoarse voice. Pt states he has had a scope 3 times in the past and it was unremarkabnle, pt has reflux. Pt states that today he had a bad dizzy spell and had to vomit. He denies any chest pain, no sob, reports "my head feels weird"   On exam, pt Neuro: 5/5 bilateral elbow flexion/extension, 5/5 bilateral wrist flexion/extension, 5/5 bilateral hand , 5/5 bilateral hip flexion/extension, 5/5 bilateral knee flexion/extension, 5/5 bilateral dorsiflexion/plantarflexion, intact sensation in the bilateral arms and legs to light touch, normal finger to nose bilaterally w/ no dysmetria, EOMI, CN2-12 intact, pupils are 4 mm and reactive bilaterally mild wide based gait. Const: Well-nourished, Well-developed, Eyes: no conjunctival injection and no scleral icterus ENMT: Moist mucus membranes, CVS: +S1/S2, radial/DP pulse 2+ bilaterally RESP: Unlabored respiratory effort, Clear to auscultation bilaterally GI: Nontender/Nondistended soft abdomen, no CVA tenderness Psych: Awake, Alert, & Orientedx3;  Appropriate mood and affect, cooperative    concern for possible worsening mass effect from pituitary adenoma. Pt w/ vertiginous symptoms that are currently resolved, possible hyponatremia vs cns bleed vs vertigo. dispo pending results.   spoke w/ wife ximena who is agreeable w/ admission

## 2021-04-12 NOTE — ED PROVIDER NOTE - NS ED ROS FT
General: Denies fevers  Eyes: Denies vision changes  ENMT: +Sore throat  CV: Denies chest pain, orthopnea  Resp: +SOB, CABRERA  GI: +Nausea, vomiting. Denies abdominal pain, diarrhea  : Denies painful urination  Skin: Denies new rashes  Neuro: +Vertigo. Denies headache, focal weakness  MSK: Denies recent trauma

## 2021-04-12 NOTE — ED PROVIDER NOTE - PHYSICAL EXAMINATION
I have reviewed the triage vital signs.  Const: AAOx3, in NAD  Eyes: no conjunctival injection, PERRL BL, EOMI BL, no nystagmus  HENT: NCAT, Neck supple, oral mucosa moist, no tonsillar exudates  CV: RRR, +S1, S2  Resp: CTAB, no respiratory distress  GI: Abdomen soft, NTND, no guarding, no CVA tenderness  Extremities: No peripheral edema,  2+ radial and DP pulses  Skin: Warm, well perfused, no rash  MSK: No gross deformities appreciated  Neuro: CN2-12 grossly intact, MS +5/5 in UE and LE BL, finger to nose smooth and rapid, gross sensation intact in UE and LE BL, gait smooth and coordinated, negative rhomberg, negative pronator drift   Psych: Appropriate mood and affect

## 2021-04-12 NOTE — ED ADULT TRIAGE NOTE - CHIEF COMPLAINT QUOTE
throat pain x5 week. and then pt has an episode of dizziness today with vomiting   pt seen by ENT and  his internist for this problem

## 2021-04-13 DIAGNOSIS — R42 DIZZINESS AND GIDDINESS: ICD-10-CM

## 2021-04-13 PROBLEM — D35.2 BENIGN NEOPLASM OF PITUITARY GLAND: Chronic | Status: ACTIVE | Noted: 2019-06-17

## 2021-04-13 PROBLEM — E23.2 DIABETES INSIPIDUS: Chronic | Status: ACTIVE | Noted: 2019-06-17

## 2021-04-13 LAB
GLUCOSE BLDC GLUCOMTR-MCNC: 113 MG/DL — HIGH (ref 70–99)
SARS-COV-2 RNA SPEC QL NAA+PROBE: SIGNIFICANT CHANGE UP

## 2021-04-13 PROCEDURE — 71045 X-RAY EXAM CHEST 1 VIEW: CPT | Mod: 26

## 2021-04-13 RX ORDER — CHOLECALCIFEROL (VITAMIN D3) 125 MCG
400 CAPSULE ORAL DAILY
Refills: 0 | Status: DISCONTINUED | OUTPATIENT
Start: 2021-04-13 | End: 2021-04-16

## 2021-04-13 RX ORDER — CITALOPRAM 10 MG/1
20 TABLET, FILM COATED ORAL DAILY
Refills: 0 | Status: DISCONTINUED | OUTPATIENT
Start: 2021-04-13 | End: 2021-04-16

## 2021-04-13 RX ORDER — SODIUM CHLORIDE 9 MG/ML
1000 INJECTION, SOLUTION INTRAVENOUS
Refills: 0 | Status: DISCONTINUED | OUTPATIENT
Start: 2021-04-13 | End: 2021-04-14

## 2021-04-13 RX ORDER — OMEPRAZOLE 10 MG/1
0 CAPSULE, DELAYED RELEASE ORAL
Qty: 0 | Refills: 0 | DISCHARGE

## 2021-04-13 RX ORDER — PANTOPRAZOLE SODIUM 20 MG/1
40 TABLET, DELAYED RELEASE ORAL
Refills: 0 | Status: DISCONTINUED | OUTPATIENT
Start: 2021-04-13 | End: 2021-04-16

## 2021-04-13 RX ORDER — DESMOPRESSIN ACETATE 0.1 MG/1
1 TABLET ORAL DAILY
Refills: 0 | Status: DISCONTINUED | OUTPATIENT
Start: 2021-04-13 | End: 2021-04-16

## 2021-04-13 RX ORDER — ASCORBIC ACID 60 MG
500 TABLET,CHEWABLE ORAL DAILY
Refills: 0 | Status: DISCONTINUED | OUTPATIENT
Start: 2021-04-13 | End: 2021-04-16

## 2021-04-13 RX ADMIN — DESMOPRESSIN ACETATE 1 SPRAY(S): 0.1 TABLET ORAL at 21:29

## 2021-04-13 RX ADMIN — SODIUM CHLORIDE 100 MILLILITER(S): 9 INJECTION, SOLUTION INTRAVENOUS at 08:34

## 2021-04-13 RX ADMIN — PANTOPRAZOLE SODIUM 40 MILLIGRAM(S): 20 TABLET, DELAYED RELEASE ORAL at 12:07

## 2021-04-13 RX ADMIN — CITALOPRAM 20 MILLIGRAM(S): 10 TABLET, FILM COATED ORAL at 12:07

## 2021-04-13 RX ADMIN — SODIUM CHLORIDE 75 MILLILITER(S): 9 INJECTION, SOLUTION INTRAVENOUS at 21:20

## 2021-04-13 RX ADMIN — Medication 500 MILLIGRAM(S): at 12:06

## 2021-04-13 RX ADMIN — Medication 400 UNIT(S): at 12:07

## 2021-04-13 RX ADMIN — Medication 1 TABLET(S): at 12:06

## 2021-04-13 NOTE — H&P ADULT - ASSESSMENT
65 year old male PMH pituitary adenoma s/p resection s/p 2 revisions with subsequent diabetes insipidus, vertigo p/w multiple complaints. Primary complaint is episode of dizziness with n/v today. Pt states he gets intermittent episodes of vertigo, described as room spinning, triggered by eye movement or head turning (unsure which direction). Today had an episode of vertigo c/w prior episodes, however it lasted for 1hr, which is longer than usual. No falls/head trauma, not on AC. No visual changes, numbness, tingling, weakness.  65 year old male PMH pituitary adenoma s/p resection s/p 2 revisions with subsequent diabetes insipidus, vertigo p/w multiple complaints. Primary complaint is episode of dizziness with n/v today. Pt states he gets intermittent episodes of vertigo, described as room spinning, triggered by eye movement or head turning (unsure which direction). Today had an episode of vertigo c/w prior episodes, however it lasted for 1hr, which is longer than usual. No falls/head trauma, not on AC. No visual changes, numbness, tingling, weakness.     Plan:  65 year old male PMH pituitary adenoma s/p resection s/p 2 revisions with subsequent diabetes insipidus, vertigo p/w multiple complaints. Primary complaint is episode of dizziness with n/v today. Pt states he gets intermittent episodes of vertigo, described as room spinning, triggered by eye movement or head turning (unsure which direction). Today had an episode of vertigo c/w prior episodes, however it lasted for 1hr, which is longer than usual. No falls/head trauma, not on AC. No visual changes, numbness, tingling, weakness.     Plan: Recurrent vertigo with NDI. Will try IVF LR 75/h for 24 hours. Initial labs all WNL. CT head normal. Will continue  home dose of Celexa 20 mg/day and DDAVP for NDI. Lytes all normal. MRI brain ordered with santosh  contrast.   No recent MRI > 3 years. PT eval. Had 6 months of vestibular retraining with ENT with minimal benefit.   Likely episodes of vertigo are self-limited and will resolve on their own. Neuro exam is non-focal on arrival.     ID: No active infections, afebrile, COVID-19 negative, CXR clear.      65 year old male PMH pituitary adenoma s/p resection s/p 2 revisions with subsequent diabetes insipidus, vertigo p/w multiple complaints. Primary complaint is episode of dizziness with n/v today. Pt states he gets intermittent episodes of vertigo, described as room spinning, triggered by eye movement or head turning (unsure which direction). Today had an episode of vertigo c/w prior episodes, however it lasted for 1hr, which is longer than usual. No falls/head trauma, not on AC. No visual changes, numbness, tingling, weakness.     Plan: Recurrent vertigo with NDI. Will try IVF LR 75/h for 24 hours. Initial labs all WNL. CT head normal. Will continue home dose of Celexa 20 mg/day and DDAVP for NDI. Lytes all normal. MRI brain ordered with santosh contrast. No recent MRI > 3 years. PT eval. Had 6 months of vestibular retraining with ENT with minimal benefit and two prior brain surgeries.  Likely episodes of vertigo are self-limited and will resolve on their own. Neuro exam is non-focal on arrival.     ID: No active infections, afebrile, COVID-19 negative, CXR clear. Request CT of neck with IV contrast tomorrow to eval for   cervical adenopathy, no lymph nodes seen on exam.

## 2021-04-13 NOTE — H&P ADULT - NSHPPHYSICALEXAM_GEN_ALL_CORE
PHYSICAL EXAMINATION:  Vital Signs Last 24 Hrs  T(C): 37 (13 Apr 2021 06:00), Max: 37 (13 Apr 2021 06:00)  T(F): 98.6 (13 Apr 2021 06:00), Max: 98.6 (13 Apr 2021 06:00)  HR: 73 (13 Apr 2021 06:00) (65 - 87)  BP: 129/74 (13 Apr 2021 06:00) (118/67 - 147/91)  BP(mean): 110 (12 Apr 2021 22:14) (110 - 110)  RR: 16 (13 Apr 2021 06:00) (16 - 18)  SpO2: 99% (13 Apr 2021 06:00) (98% - 99%)  CAPILLARY BLOOD GLUCOSE          GENERAL: NAD, well-groomed, well-developed  HEAD:  atraumatic, normocephalic  EYES: sclera anicteric  ENMT: mucous membranes moist  NECK: supple, No JVD  CHEST/LUNG: clear to auscultation bilaterally; no rales, rhonchi, or wheezing b/l  HEART: normal S1, S2  ABDOMEN: BS+, soft, ND, NT   EXTREMITIES:  pulses palpable; no clubbing, cyanosis, or edema b/l LEs  NEURO: awake, alert, interactive; moves all extremities  SKIN: no rashes or lesions PHYSICAL EXAMINATION:  Vital Signs Last 24 Hrs  T(C): 37 (13 Apr 2021 06:00), Max: 37 (13 Apr 2021 06:00)  T(F): 98.6 (13 Apr 2021 06:00), Max: 98.6 (13 Apr 2021 06:00)  HR: 73 (13 Apr 2021 06:00) (65 - 87)  BP: 129/74 (13 Apr 2021 06:00) (118/67 - 147/91)  BP(mean): 110 (12 Apr 2021 22:14) (110 - 110)  RR: 16 (13 Apr 2021 06:00) (16 - 18)  SpO2: 99% (13 Apr 2021 06:00) (98% - 99%)  CAPILLARY BLOOD GLUCOSE          GENERAL: NAD, well-groomed, well-developed, seen in ER, comfortable, no fevers or SOB  HEAD:  atraumatic, normocephalic  EYES: sclera anicteric  ENMT: mucous membranes moist  NECK: supple, No JVD  CHEST/LUNG: clear to auscultation bilaterally; no rales, rhonchi, or wheezing b/l  HEART: normal S1, S2  ABDOMEN: BS+, soft, ND, NT   EXTREMITIES:  pulses palpable; no clubbing, cyanosis, or edema b/l LEs  NEURO: awake, alert, interactive; moves all extremities  CN intact, full MP bilateral Upper and lower extremities, ,speech fluent, CN intact   SKIN: no rashes or lesions

## 2021-04-13 NOTE — ED ADULT NURSE REASSESSMENT NOTE - NS ED NURSE REASSESS COMMENT FT1
Report received from SILVIA Carbajal, pt received A&Ox3, IV intact, VSS,pt denies pain/discomfort & is resting in bed comfortably pending bed assignment.

## 2021-04-13 NOTE — H&P ADULT - NSHPLABSRESULTS_GEN_ALL_CORE
LABS:                        12.2   8.47  )-----------( 212      ( 2021 23:32 )             36.5     04-12    136  |  102  |  22  ----------------------------<  109<H>  3.9   |  23  |  1.11    Ca    8.9      2021 23:32  Phos  3.3     04-12  Mg     2.1     -12    TPro  7.3  /  Alb  4.0  /  TBili  0.6  /  DBili  x   /  AST  19  /  ALT  17  /  AlkPhos  76  04-12      Urinalysis Basic - ( 2021 23:33 )    Color: Light Yellow / Appearance: Clear / S.021 / pH: x  Gluc: x / Ketone: Negative  / Bili: Negative / Urobili: Negative   Blood: x / Protein: Trace / Nitrite: Negative   Leuk Esterase: Negative / RBC: 1 /hpf / WBC 1 /HPF   Sq Epi: x / Non Sq Epi: 0 /hpf / Bacteria: Negative          RADIOLOGY & ADDITIONAL TESTS:

## 2021-04-13 NOTE — PHYSICAL THERAPY INITIAL EVALUATION ADULT - ADDITIONAL COMMENTS
Patient lives in pvt house with spouse, 4  steps to enter. steps+ inside. However, can manage on one level. Patient lives in pvt house with spouse, 4  steps to enter. steps+ inside. However, can manage on one level.  Patient ambulated without AD independent.

## 2021-04-13 NOTE — H&P ADULT - HISTORY OF PRESENT ILLNESS
65 year old male PMH pituitary adenoma s/p resection s/p 2 revisions with subsequent diabetes insipidus, vertigo p/w multiple complaints. Primary complaint is episode of dizziness with n/v today. Pt states he gets intermittent episodes of vertigo, described as room spinning, triggered by eye movement or head turning (unsure which direction). Today had an episode of vertigo c/w prior episodes, however it lasted for 1hr, which is longer than usual. No falls/head trauma, not on AC. No visual changes, numbness, tingling, weakness.

## 2021-04-13 NOTE — PHYSICAL THERAPY INITIAL EVALUATION ADULT - PERTINENT HX OF CURRENT PROBLEM, REHAB EVAL
65M PMH pituitary adenoma s/p resection s/p 2 revisions with subsequent diabetes insipidus, vertigo p/w multiple complaints. Primary complaint is episode of dizziness. with n/v 4/12.pt. c Pt states he gets intermittent episodes of vertigo, described as room spinning, triggered by eye movement or head turning. CTH - no acute intracranial disease.

## 2021-04-14 LAB
COVID-19 SPIKE DOMAIN AB INTERP: POSITIVE
COVID-19 SPIKE DOMAIN ANTIBODY RESULT: >250 U/ML — HIGH
CULTURE RESULTS: NO GROWTH — SIGNIFICANT CHANGE UP
GLUCOSE BLDC GLUCOMTR-MCNC: 87 MG/DL — SIGNIFICANT CHANGE UP (ref 70–99)
SARS-COV-2 IGG+IGM SERPL QL IA: >250 U/ML — HIGH
SARS-COV-2 IGG+IGM SERPL QL IA: POSITIVE
SPECIMEN SOURCE: SIGNIFICANT CHANGE UP

## 2021-04-14 PROCEDURE — 71250 CT THORAX DX C-: CPT | Mod: 26

## 2021-04-14 PROCEDURE — 70553 MRI BRAIN STEM W/O & W/DYE: CPT | Mod: 26

## 2021-04-14 RX ORDER — SODIUM CHLORIDE 9 MG/ML
1000 INJECTION, SOLUTION INTRAVENOUS
Refills: 0 | Status: DISCONTINUED | OUTPATIENT
Start: 2021-04-14 | End: 2021-04-16

## 2021-04-14 RX ADMIN — SODIUM CHLORIDE 75 MILLILITER(S): 9 INJECTION, SOLUTION INTRAVENOUS at 22:03

## 2021-04-14 RX ADMIN — PANTOPRAZOLE SODIUM 40 MILLIGRAM(S): 20 TABLET, DELAYED RELEASE ORAL at 05:24

## 2021-04-14 RX ADMIN — Medication 400 UNIT(S): at 12:59

## 2021-04-14 RX ADMIN — CITALOPRAM 20 MILLIGRAM(S): 10 TABLET, FILM COATED ORAL at 12:59

## 2021-04-14 RX ADMIN — Medication 500 MILLIGRAM(S): at 12:59

## 2021-04-14 RX ADMIN — Medication 1 TABLET(S): at 12:59

## 2021-04-14 RX ADMIN — DESMOPRESSIN ACETATE 1 SPRAY(S): 0.1 TABLET ORAL at 22:45

## 2021-04-14 NOTE — PROGRESS NOTE ADULT - SUBJECTIVE AND OBJECTIVE BOX
INTERVAL HPI/OVERNIGHT EVENTS:  Pt seen and examined at bedside.     Allergies/Intolerance: No Known Allergies      MEDICATIONS  (STANDING):  ascorbic acid 500 milliGRAM(s) Oral daily  cholecalciferol 400 Unit(s) Oral daily  citalopram 20 milliGRAM(s) Oral daily  desmopressin 0.01% Nasal 1 Spray(s) Nasal daily  lactated ringers. 1000 milliLiter(s) (75 mL/Hr) IV Continuous <Continuous>  multivitamin 1 Tablet(s) Oral daily  pantoprazole    Tablet 40 milliGRAM(s) Oral before breakfast    MEDICATIONS  (PRN):        ROS: all systems reviewed and wnl      PHYSICAL EXAMINATION:  Vital Signs Last 24 Hrs  T(C): 36.8 (2021 05:01), Max: 36.8 (2021 05:01)  T(F): 98.2 (2021 05:01), Max: 98.2 (2021 05:01)  HR: 65 (2021 05:01) (65 - 68)  BP: 103/58 (2021 05:01) (101/62 - 134/67)  BP(mean): --  RR: 17 (2021 05:01) (17 - 18)  SpO2: 100% (2021 05:01) (94% - 100%)  CAPILLARY BLOOD GLUCOSE      POCT Blood Glucose.: 113 mg/dL (2021 22:52)        GENERAL:   NECK: supple, No JVD  CHEST/LUNG: clear to auscultation bilaterally; no rales, rhonchi, or wheezing b/l  HEART: normal S1, S2  ABDOMEN: BS+, soft, ND, NT   EXTREMITIES:  pulses palpable; no clubbing, cyanosis, or edema b/l LEs  SKIN: no rashes or lesions      LABS:                        12.2   8.47  )-----------( 212      ( 2021 23:32 )             36.5     04-12    136  |  102  |  22  ----------------------------<  109<H>  3.9   |  23  |  1.11    Ca    8.9      2021 23:32  Phos  3.3     04-12  Mg     2.1     04-12    TPro  7.3  /  Alb  4.0  /  TBili  0.6  /  DBili  x   /  AST  19  /  ALT  17  /  AlkPhos  76  04-12      Urinalysis Basic - ( 2021 23:33 )    Color: Light Yellow / Appearance: Clear / S.021 / pH: x  Gluc: x / Ketone: Negative  / Bili: Negative / Urobili: Negative   Blood: x / Protein: Trace / Nitrite: Negative   Leuk Esterase: Negative / RBC: 1 /hpf / WBC 1 /HPF   Sq Epi: x / Non Sq Epi: 0 /hpf / Bacteria: Negative             INTERVAL HPI/OVERNIGHT EVENTS:  Pt seen and examined at bedside.     Allergies/Intolerance: No Known Allergies      MEDICATIONS  (STANDING):  ascorbic acid 500 milliGRAM(s) Oral daily  cholecalciferol 400 Unit(s) Oral daily  citalopram 20 milliGRAM(s) Oral daily  desmopressin 0.01% Nasal 1 Spray(s) Nasal daily  lactated ringers. 1000 milliLiter(s) (75 mL/Hr) IV Continuous <Continuous>  multivitamin 1 Tablet(s) Oral daily  pantoprazole    Tablet 40 milliGRAM(s) Oral before breakfast    MEDICATIONS  (PRN):        ROS: all systems reviewed and wnl      PHYSICAL EXAMINATION:  Vital Signs Last 24 Hrs  T(C): 36.8 (2021 05:01), Max: 36.8 (2021 05:01)  T(F): 98.2 (2021 05:01), Max: 98.2 (2021 05:01)  HR: 65 (2021 05:01) (65 - 68)  BP: 103/58 (2021 05:01) (101/62 - 134/67)  BP(mean): --  RR: 17 (2021 05:01) (17 - 18)  SpO2: 100% (2021 05:01) (94% - 100%)  CAPILLARY BLOOD GLUCOSE      POCT Blood Glucose.: 113 mg/dL (2021 22:52)        GENERAL: stable, feels better after IVF  NECK: supple, No JVD  CHEST/LUNG: clear to auscultation bilaterally; no rales, rhonchi, or wheezing b/l  HEART: normal S1, S2  ABDOMEN: BS+, soft, ND, NT   EXTREMITIES:  pulses palpable; no clubbing, cyanosis, or edema b/l LEs  SKIN: no rashes or lesions      LABS:                        12.2   8.47  )-----------( 212      ( 2021 23:32 )             36.5     04-12    136  |  102  |  22  ----------------------------<  109<H>  3.9   |  23  |  1.11    Ca    8.9      2021 23:32  Phos  3.3     04-12  Mg     2.1     04-12    TPro  7.3  /  Alb  4.0  /  TBili  0.6  /  DBili  x   /  AST  19  /  ALT  17  /  AlkPhos  76  -      Urinalysis Basic - ( 2021 23:33 )    Color: Light Yellow / Appearance: Clear / S.021 / pH: x  Gluc: x / Ketone: Negative  / Bili: Negative / Urobili: Negative   Blood: x / Protein: Trace / Nitrite: Negative   Leuk Esterase: Negative / RBC: 1 /hpf / WBC 1 /HPF   Sq Epi: x / Non Sq Epi: 0 /hpf / Bacteria: Negative

## 2021-04-15 LAB
ANION GAP SERPL CALC-SCNC: 11 MMOL/L — SIGNIFICANT CHANGE UP (ref 5–17)
BUN SERPL-MCNC: 26 MG/DL — HIGH (ref 7–23)
CALCIUM SERPL-MCNC: 9.3 MG/DL — SIGNIFICANT CHANGE UP (ref 8.4–10.5)
CHLORIDE SERPL-SCNC: 102 MMOL/L — SIGNIFICANT CHANGE UP (ref 96–108)
CO2 SERPL-SCNC: 24 MMOL/L — SIGNIFICANT CHANGE UP (ref 22–31)
CREAT SERPL-MCNC: 1.23 MG/DL — SIGNIFICANT CHANGE UP (ref 0.5–1.3)
GLUCOSE SERPL-MCNC: 86 MG/DL — SIGNIFICANT CHANGE UP (ref 70–99)
POTASSIUM SERPL-MCNC: 4.3 MMOL/L — SIGNIFICANT CHANGE UP (ref 3.5–5.3)
POTASSIUM SERPL-SCNC: 4.3 MMOL/L — SIGNIFICANT CHANGE UP (ref 3.5–5.3)
SODIUM SERPL-SCNC: 137 MMOL/L — SIGNIFICANT CHANGE UP (ref 135–145)

## 2021-04-15 PROCEDURE — 94010 BREATHING CAPACITY TEST: CPT | Mod: 26

## 2021-04-15 PROCEDURE — 70491 CT SOFT TISSUE NECK W/DYE: CPT | Mod: 26

## 2021-04-15 RX ADMIN — Medication 500 MILLIGRAM(S): at 17:19

## 2021-04-15 RX ADMIN — Medication 400 UNIT(S): at 17:17

## 2021-04-15 RX ADMIN — SODIUM CHLORIDE 60 MILLILITER(S): 9 INJECTION, SOLUTION INTRAVENOUS at 21:31

## 2021-04-15 RX ADMIN — SODIUM CHLORIDE 75 MILLILITER(S): 9 INJECTION, SOLUTION INTRAVENOUS at 06:11

## 2021-04-15 RX ADMIN — Medication 1 TABLET(S): at 17:19

## 2021-04-15 RX ADMIN — CITALOPRAM 20 MILLIGRAM(S): 10 TABLET, FILM COATED ORAL at 17:20

## 2021-04-15 RX ADMIN — PANTOPRAZOLE SODIUM 40 MILLIGRAM(S): 20 TABLET, DELAYED RELEASE ORAL at 06:26

## 2021-04-15 RX ADMIN — DESMOPRESSIN ACETATE 1 SPRAY(S): 0.1 TABLET ORAL at 21:32

## 2021-04-15 NOTE — PROGRESS NOTE ADULT - SUBJECTIVE AND OBJECTIVE BOX
INTERVAL HPI/OVERNIGHT EVENTS:  Pt seen and examined at bedside.     Allergies/Intolerance: No Known Allergies      MEDICATIONS  (STANDING):  ascorbic acid 500 milliGRAM(s) Oral daily  cholecalciferol 400 Unit(s) Oral daily  citalopram 20 milliGRAM(s) Oral daily  desmopressin 0.01% Nasal 1 Spray(s) Nasal daily  lactated ringers. 1000 milliLiter(s) (75 mL/Hr) IV Continuous <Continuous>  multivitamin 1 Tablet(s) Oral daily  pantoprazole    Tablet 40 milliGRAM(s) Oral before breakfast    MEDICATIONS  (PRN):        ROS: all systems reviewed and wnl      PHYSICAL EXAMINATION:  Vital Signs Last 24 Hrs  T(C): 36.9 (15 Apr 2021 05:11), Max: 36.9 (15 Apr 2021 05:11)  T(F): 98.4 (15 Apr 2021 05:11), Max: 98.4 (15 Apr 2021 05:11)  HR: 68 (15 Apr 2021 05:11) (65 - 68)  BP: 116/73 (15 Apr 2021 05:11) (113/68 - 126/76)  BP(mean): --  RR: 18 (15 Apr 2021 05:11) (18 - 18)  SpO2: 99% (15 Apr 2021 05:11) (97% - 99%)  CAPILLARY BLOOD GLUCOSE      POCT Blood Glucose.: 87 mg/dL (14 Apr 2021 17:54)      04-14 @ 07:01  -  04-15 @ 07:00  --------------------------------------------------------  IN: 480 mL / OUT: 700 mL / NET: -220 mL        GENERAL:   NECK: supple, No JVD  CHEST/LUNG: clear to auscultation bilaterally; no rales, rhonchi, or wheezing b/l  HEART: normal S1, S2  ABDOMEN: BS+, soft, ND, NT   EXTREMITIES:  pulses palpable; no clubbing, cyanosis, or edema b/l LEs  SKIN: no rashes or lesions      LABS:    04-15    137  |  102  |  26<H>  ----------------------------<  86  4.3   |  24  |  1.23    Ca    9.3      15 Apr 2021 07:34                 INTERVAL HPI/OVERNIGHT EVENTS:  Pt seen and examined at bedside.     Allergies/Intolerance: No Known Allergies      MEDICATIONS  (STANDING):  ascorbic acid 500 milliGRAM(s) Oral daily  cholecalciferol 400 Unit(s) Oral daily  citalopram 20 milliGRAM(s) Oral daily  desmopressin 0.01% Nasal 1 Spray(s) Nasal daily  lactated ringers. 1000 milliLiter(s) (75 mL/Hr) IV Continuous <Continuous>  multivitamin 1 Tablet(s) Oral daily  pantoprazole    Tablet 40 milliGRAM(s) Oral before breakfast    MEDICATIONS  (PRN):        ROS: all systems reviewed and wnl      PHYSICAL EXAMINATION:  Vital Signs Last 24 Hrs  T(C): 36.9 (15 Apr 2021 05:11), Max: 36.9 (15 Apr 2021 05:11)  T(F): 98.4 (15 Apr 2021 05:11), Max: 98.4 (15 Apr 2021 05:11)  HR: 68 (15 Apr 2021 05:11) (65 - 68)  BP: 116/73 (15 Apr 2021 05:11) (113/68 - 126/76)  BP(mean): --  RR: 18 (15 Apr 2021 05:11) (18 - 18)  SpO2: 99% (15 Apr 2021 05:11) (97% - 99%)  CAPILLARY BLOOD GLUCOSE      POCT Blood Glucose.: 87 mg/dL (14 Apr 2021 17:54)      04-14 @ 07:01  -  04-15 @ 07:00  --------------------------------------------------------  IN: 480 mL / OUT: 700 mL / NET: -220 mL        GENERAL: stable, in chair, comfortable, no fevers.   NECK: supple, No JVD  CHEST/LUNG: clear to auscultation bilaterally; no rales, rhonchi, or wheezing b/l  HEART: normal S1, S2  ABDOMEN: BS+, soft, ND, NT   EXTREMITIES:  pulses palpable; no clubbing, cyanosis, or edema b/l LEs  SKIN: no rashes or lesions      LABS:    04-15    137  |  102  |  26<H>  ----------------------------<  86  4.3   |  24  |  1.23    Ca    9.3      15 Apr 2021 07:34

## 2021-04-15 NOTE — PROGRESS NOTE ADULT - ASSESSMENT
65 year old male PMH pituitary adenoma s/p resection s/p 2 revisions with subsequent diabetes insipidus, vertigo p/w multiple complaints. Primary complaint is episode of dizziness with n/v today. Pt states he gets intermittent episodes of vertigo, described as room spinning, triggered by eye movement or head turning (unsure which direction). Today had an episode of vertigo c/w prior episodes, however it lasted for 1hr, which is longer than usual. No falls/head trauma, not on AC. No visual changes, numbness, tingling, weakness.     Plan: Recurrent vertigo with NDI. Will try IVF LR 75/h for 24 hours. Initial labs all WNL. CT head normal. Will continue home dose of Celexa 20 mg/day and DDAVP for NDI. Lytes all normal. MRI brain ordered with santosh contrast. No recent MRI > 3 years. PT eval. Had 6 months of vestibular retraining with ENT with minimal benefit and two prior brain surgeries.  Likely episodes of vertigo are self-limited and will resolve on their own. Neuro exam is non-focal on arrival.     ID: No active infections, afebrile, COVID-19 negative, CXR clear. Request CT of neck with IV contrast tomorrow to eval for   cervical adenopathy, no lymph nodes seen on exam.   CT chest today non-contrast to eval for pneumonia. Bedside spirometry   as well.    65 year old male PMH pituitary adenoma s/p resection s/p 2 revisions with subsequent diabetes insipidus, vertigo p/w multiple complaints. Primary complaint is episode of dizziness with n/v today. Pt states he gets intermittent episodes of vertigo, described as room spinning, triggered by eye movement or head turning (unsure which direction). Today had an episode of vertigo c/w prior episodes, however it lasted for 1hr, which is longer than usual. No falls/head trauma, not on AC. No visual changes, numbness, tingling, weakness.     Plan: Recurrent vertigo with NDI. Will decrease IVF LR 60/h. Initial labs all WNL. CT head normal. Will continue home dose of Celexa 20 mg/day and DDAVP for NDI. Lytes all normal. MRI brain santosh contrast, stable pituitary, no new growth. Had 6 months of vestibular retraining with ENT with minimal benefit and two prior brain surgeries.  Likely episodes of vertigo are self-limited and will resolve on their own. Neuro exam is non-focal on arrival.     ID: No active infections, afebrile, COVID-19 negative, CXR clear. Request CT of neck with IV contrast today to eval for   cervical adenopathy, no lymph nodes seen on exam.   CT chest today non-contrast to eval for pneumonia. Bedside spirometry   as well.       Discharge home Friday if stable.

## 2021-04-16 ENCOUNTER — TRANSCRIPTION ENCOUNTER (OUTPATIENT)
Age: 66
End: 2021-04-16

## 2021-04-16 VITALS
RESPIRATION RATE: 18 BRPM | TEMPERATURE: 98 F | OXYGEN SATURATION: 99 % | DIASTOLIC BLOOD PRESSURE: 71 MMHG | SYSTOLIC BLOOD PRESSURE: 122 MMHG | HEART RATE: 59 BPM

## 2021-04-16 LAB
ANION GAP SERPL CALC-SCNC: 9 MMOL/L — SIGNIFICANT CHANGE UP (ref 5–17)
BUN SERPL-MCNC: 23 MG/DL — SIGNIFICANT CHANGE UP (ref 7–23)
CALCIUM SERPL-MCNC: 9.4 MG/DL — SIGNIFICANT CHANGE UP (ref 8.4–10.5)
CHLORIDE SERPL-SCNC: 99 MMOL/L — SIGNIFICANT CHANGE UP (ref 96–108)
CO2 SERPL-SCNC: 24 MMOL/L — SIGNIFICANT CHANGE UP (ref 22–31)
CREAT SERPL-MCNC: 1.09 MG/DL — SIGNIFICANT CHANGE UP (ref 0.5–1.3)
GLUCOSE SERPL-MCNC: 94 MG/DL — SIGNIFICANT CHANGE UP (ref 70–99)
POTASSIUM SERPL-MCNC: 4.4 MMOL/L — SIGNIFICANT CHANGE UP (ref 3.5–5.3)
POTASSIUM SERPL-SCNC: 4.4 MMOL/L — SIGNIFICANT CHANGE UP (ref 3.5–5.3)
SODIUM SERPL-SCNC: 132 MMOL/L — LOW (ref 135–145)

## 2021-04-16 PROCEDURE — 82962 GLUCOSE BLOOD TEST: CPT

## 2021-04-16 PROCEDURE — 96374 THER/PROPH/DIAG INJ IV PUSH: CPT

## 2021-04-16 PROCEDURE — 83735 ASSAY OF MAGNESIUM: CPT

## 2021-04-16 PROCEDURE — 94010 BREATHING CAPACITY TEST: CPT

## 2021-04-16 PROCEDURE — 87086 URINE CULTURE/COLONY COUNT: CPT

## 2021-04-16 PROCEDURE — 99285 EMERGENCY DEPT VISIT HI MDM: CPT

## 2021-04-16 PROCEDURE — A9585: CPT

## 2021-04-16 PROCEDURE — 70491 CT SOFT TISSUE NECK W/DYE: CPT

## 2021-04-16 PROCEDURE — U0005: CPT

## 2021-04-16 PROCEDURE — 84484 ASSAY OF TROPONIN QUANT: CPT

## 2021-04-16 PROCEDURE — 71250 CT THORAX DX C-: CPT

## 2021-04-16 PROCEDURE — 80048 BASIC METABOLIC PNL TOTAL CA: CPT

## 2021-04-16 PROCEDURE — U0003: CPT

## 2021-04-16 PROCEDURE — 83880 ASSAY OF NATRIURETIC PEPTIDE: CPT

## 2021-04-16 PROCEDURE — 84100 ASSAY OF PHOSPHORUS: CPT

## 2021-04-16 PROCEDURE — 71045 X-RAY EXAM CHEST 1 VIEW: CPT

## 2021-04-16 PROCEDURE — 85025 COMPLETE CBC W/AUTO DIFF WBC: CPT

## 2021-04-16 PROCEDURE — 86769 SARS-COV-2 COVID-19 ANTIBODY: CPT

## 2021-04-16 PROCEDURE — 70450 CT HEAD/BRAIN W/O DYE: CPT

## 2021-04-16 PROCEDURE — 80053 COMPREHEN METABOLIC PANEL: CPT

## 2021-04-16 PROCEDURE — 97161 PT EVAL LOW COMPLEX 20 MIN: CPT

## 2021-04-16 PROCEDURE — 81001 URINALYSIS AUTO W/SCOPE: CPT

## 2021-04-16 PROCEDURE — 70553 MRI BRAIN STEM W/O & W/DYE: CPT

## 2021-04-16 RX ORDER — IBUPROFEN 200 MG
1 TABLET ORAL
Qty: 0 | Refills: 0 | DISCHARGE

## 2021-04-16 RX ORDER — LACTOBACILLUS ACIDOPHILUS 100MM CELL
0 CAPSULE ORAL
Qty: 0 | Refills: 0 | DISCHARGE

## 2021-04-16 RX ADMIN — PANTOPRAZOLE SODIUM 40 MILLIGRAM(S): 20 TABLET, DELAYED RELEASE ORAL at 06:36

## 2021-04-16 RX ADMIN — CITALOPRAM 20 MILLIGRAM(S): 10 TABLET, FILM COATED ORAL at 12:51

## 2021-04-16 RX ADMIN — Medication 500 MILLIGRAM(S): at 12:51

## 2021-04-16 RX ADMIN — Medication 400 UNIT(S): at 12:52

## 2021-04-16 RX ADMIN — Medication 1 TABLET(S): at 12:51

## 2021-04-16 NOTE — DISCHARGE NOTE PROVIDER - HOSPITAL COURSE
65 year old male, with PMHX of pituitary adenoma (s/p resection and s/p 2 revisions with subsequent diabetes insipidus), and vertigo, p/w primary complaint of dizziness with n/v today. Pt states he gets intermittent episodes of vertigo, described as room spinning, triggered by eye movement or head turning (unsure which direction). Today had an episode of vertigo c/w prior episodes, however it lasted for 1hr, which is longer than usual. No falls/head trauma, not on AC. No visual changes, numbness, tingling, weakness.  Patient also complaining of shortness of breath.  CT Head is negative.  Chest Xray is negative.  CT chest shows no pneumonia; 3.4 cm low-attenuation structure is noted in the pretracheal space - it measures fluid attenuation and thus represents the cyst.  MR Head shows postsurgical changes and grossly stable mass in the sella most compatible with residual adenoma; no new or enlarging mass identified; no acute intracranial findings.  CT neck shows 65 year old male, with PMHX of pituitary adenoma (s/p resection and s/p 2 revisions with subsequent diabetes insipidus), and vertigo, p/w primary complaint of dizziness with n/v today. Pt states he gets intermittent episodes of vertigo, described as room spinning, triggered by eye movement or head turning (unsure which direction). Today had an episode of vertigo c/w prior episodes, however it lasted for 1hr, which is longer than usual. No falls/head trauma, not on AC. No visual changes, numbness, tingling, weakness.  Patient also complaining of shortness of breath.  CT Head is negative.  Chest Xray is negative.  CT chest shows no pneumonia; 3.4 cm low-attenuation structure is noted in the pretracheal space - it measures fluid attenuation and thus represents the cyst.  MR Head shows postsurgical changes and grossly stable mass in the sella most compatible with residual adenoma; no new or enlarging mass identified; no acute intracranial findings.  CT neck shows no enlarged lymph nodes.  Patient is cleared for discharge, by Dr. Navarro, with PCP, ENT, and Neurology follow up. 65 year old male PMH of pituitary adenoma (s/p resection and s/p 2 revisions with subsequent diabetes insipidus), and vertigo, p/w primary complaint of dizziness with n/v today. Pt states he gets intermittent episodes of vertigo, described as room spinning, triggered by eye movement or head turning (unsure which direction). Today had an episode of vertigo c/w prior episodes, however it lasted for 1hr, which is longer than usual. No falls/head trauma, not on AC. No visual changes, numbness, tingling, weakness.  Patient also complaining of shortness of breath.  Had COVID infection earlier in the year but has fully recovered.     On arrival, CT Head is negative for any acute findings.  Chest xray is entirely clear. CT chest shows no pneumonia.  MR Head shows postsurgical changes and grossly stable mass in the sella most compatible with residual adenoma; no new or enlarging mass identified in pituitary. CT neck shows no enlarged lymph nodes.      Responded mostly to IVF for 48 hours and felt better. See attached med list, all home meds will stay the same. No infections were found. 65 year old male PMH of pituitary adenoma (s/p resection and s/p 2 revisions with subsequent diabetes insipidus), and vertigo, p/w primary complaint of dizziness with n/v today. Pt states he gets intermittent episodes of vertigo, described as room spinning, triggered by eye movement or head turning (unsure which direction). Today had an episode of vertigo c/w prior episodes, however it lasted for 1hr, which is longer than usual. No falls/head trauma, not on AC. No visual changes, numbness, tingling, weakness.  Patient also complaining of shortness of breath.  Had COVID infection earlier in the year but has fully recovered.     On arrival, CT Head is negative for any acute findings.  Chest xray is entirely clear. CT chest shows no pneumonia.  MR Head shows postsurgical changes and grossly stable mass in the sella most compatible with residual adenoma; no new or enlarging mass identified in pituitary. CT neck shows no enlarged lymph nodes.   Patient felt he had enlarged cervical lymph nodes, none could be palpated on exam.       Responded mostly to IVF for 48 hours and felt better. See attached med list, all home meds will stay the same. No infections were found.

## 2021-04-16 NOTE — DISCHARGE NOTE PROVIDER - CARE PROVIDER_API CALL
Parker Villegas)  Internal Medicine  [address] [facility] [address] [facility], [address]  [place] [address]  Phone: [phone]  Fax: [phone]  Follow Up Time:

## 2021-04-16 NOTE — DISCHARGE NOTE PROVIDER - NSDCCPCAREPLAN_GEN_ALL_CORE_FT
PRINCIPAL DISCHARGE DIAGNOSIS  Diagnosis: Dizziness  Assessment and Plan of Treatment:       SECONDARY DISCHARGE DIAGNOSES  Diagnosis: Shortness of breath  Assessment and Plan of Treatment: Improved  You will need to follow up with your primary medical doctor within one week of discharge - please call to make an appointment.       PRINCIPAL DISCHARGE DIAGNOSIS  Diagnosis: Dizziness  Assessment and Plan of Treatment: You will need to follow up with your neurologist within one week of discharge - please call to make an appointment.      SECONDARY DISCHARGE DIAGNOSES  Diagnosis: Shortness of breath  Assessment and Plan of Treatment: Improved  You will need to follow up with your primary medical doctor within one week of discharge - please call to make an appointment.      Diagnosis: Sore throat  Assessment and Plan of Treatment: You will need to follow up with your ENT within 1-2 weeks of discharge - please call to make an appointment.

## 2021-04-16 NOTE — CHART NOTE - NSCHARTNOTEFT_GEN_A_CORE
Request from Dr. Navarro to facilitate patient discharge.  Discussed lab work with Dr. Navarro, including sodium of 132 - no need for intervention and patient is cleared for discharge now with follow up with Dr. Villegas within one week of discharge.  Medication reconciliation reviewed, revised, and resolved with Dr. Navarro, who has medically cleared patient for discharge with follow-up as advised.  Please refer to discharge note for detailed hospital course. Request from Dr. Navarro to facilitate patient discharge.  Discussed lab work with Dr. Navarro, including sodium of 132 - no need for intervention or further monitoring, and patient is cleared for discharge now with follow up with Dr. Villegas within one week of discharge.  Medication reconciliation reviewed, revised, and resolved with Dr. Navarro, who has medically cleared patient for discharge with follow-up as advised.  Please refer to discharge note for detailed hospital course.

## 2021-04-16 NOTE — PROGRESS NOTE ADULT - SUBJECTIVE AND OBJECTIVE BOX
INTERVAL HPI/OVERNIGHT EVENTS:  Pt seen and examined at bedside.     Allergies/Intolerance: No Known Allergies      MEDICATIONS  (STANDING):  ascorbic acid 500 milliGRAM(s) Oral daily  cholecalciferol 400 Unit(s) Oral daily  citalopram 20 milliGRAM(s) Oral daily  desmopressin 0.01% Nasal 1 Spray(s) Nasal daily  lactated ringers. 1000 milliLiter(s) (60 mL/Hr) IV Continuous <Continuous>  multivitamin 1 Tablet(s) Oral daily  pantoprazole    Tablet 40 milliGRAM(s) Oral before breakfast    MEDICATIONS  (PRN):        ROS: all systems reviewed and wnl      PHYSICAL EXAMINATION:  Vital Signs Last 24 Hrs  T(C): 36.9 (16 Apr 2021 04:15), Max: 36.9 (15 Apr 2021 14:40)  T(F): 98.4 (16 Apr 2021 04:15), Max: 98.5 (15 Apr 2021 14:40)  HR: 60 (16 Apr 2021 04:15) (57 - 62)  BP: 125/76 (16 Apr 2021 04:15) (115/73 - 125/76)  BP(mean): --  RR: 18 (16 Apr 2021 04:15) (18 - 18)  SpO2: 98% (16 Apr 2021 04:15) (98% - 100%)  CAPILLARY BLOOD GLUCOSE          04-15 @ 07:01  -  04-16 @ 07:00  --------------------------------------------------------  IN: 720 mL / OUT: 950 mL / NET: -230 mL    04-16 @ 07:01  -  04-16 @ 08:05  --------------------------------------------------------  IN: 0 mL / OUT: 675 mL / NET: -675 mL        GENERAL:   NECK: supple, No JVD  CHEST/LUNG: clear to auscultation bilaterally; no rales, rhonchi, or wheezing b/l  HEART: normal S1, S2  ABDOMEN: BS+, soft, ND, NT   EXTREMITIES:  pulses palpable; no clubbing, cyanosis, or edema b/l LEs  SKIN: no rashes or lesions      LABS:    04-15    137  |  102  |  26<H>  ----------------------------<  86  4.3   |  24  |  1.23    Ca    9.3      15 Apr 2021 07:34                 INTERVAL HPI/OVERNIGHT EVENTS:  Pt seen and examined at bedside.     Allergies/Intolerance: No Known Allergies      MEDICATIONS  (STANDING):  ascorbic acid 500 milliGRAM(s) Oral daily  cholecalciferol 400 Unit(s) Oral daily  citalopram 20 milliGRAM(s) Oral daily  desmopressin 0.01% Nasal 1 Spray(s) Nasal daily  lactated ringers. 1000 milliLiter(s) (60 mL/Hr) IV Continuous <Continuous>  multivitamin 1 Tablet(s) Oral daily  pantoprazole    Tablet 40 milliGRAM(s) Oral before breakfast    MEDICATIONS  (PRN):        ROS: all systems reviewed and wnl      PHYSICAL EXAMINATION:  Vital Signs Last 24 Hrs  T(C): 36.9 (16 Apr 2021 04:15), Max: 36.9 (15 Apr 2021 14:40)  T(F): 98.4 (16 Apr 2021 04:15), Max: 98.5 (15 Apr 2021 14:40)  HR: 60 (16 Apr 2021 04:15) (57 - 62)  BP: 125/76 (16 Apr 2021 04:15) (115/73 - 125/76)  BP(mean): --  RR: 18 (16 Apr 2021 04:15) (18 - 18)  SpO2: 98% (16 Apr 2021 04:15) (98% - 100%)  CAPILLARY BLOOD GLUCOSE          04-15 @ 07:01  -  04-16 @ 07:00  --------------------------------------------------------  IN: 720 mL / OUT: 950 mL / NET: -230 mL    04-16 @ 07:01  -  04-16 @ 08:05  --------------------------------------------------------  IN: 0 mL / OUT: 675 mL / NET: -675 mL        GENERAL: no new complaints  NECK: supple, No JVD  CHEST/LUNG: clear to auscultation bilaterally; no rales, rhonchi, or wheezing b/l  HEART: normal S1, S2  ABDOMEN: BS+, soft, ND, NT   EXTREMITIES:  pulses palpable; no clubbing, cyanosis, or edema b/l LEs  SKIN: no rashes or lesions      LABS:    04-15    137  |  102  |  26<H>  ----------------------------<  86  4.3   |  24  |  1.23    Ca    9.3      15 Apr 2021 07:34

## 2021-04-16 NOTE — DISCHARGE NOTE PROVIDER - NSDCFUADDAPPT_GEN_ALL_CORE_FT
You will need to follow up with your primary medical doctor within one week of discharge - please call to make this appointment.  At this appointment, you should have your electrolytes monitored, including your sodium level.    You will need to follow up with your neurologist within one week of discharge - please call to make an appointment.    You will need to follow up with your ENT within 1-2 weeks of discharge - please call to make an appointment.

## 2021-04-16 NOTE — DISCHARGE NOTE NURSING/CASE MANAGEMENT/SOCIAL WORK - PATIENT PORTAL LINK FT
You can access the FollowMyHealth Patient Portal offered by [facility] by registering at the following website: [email or web address]. By joining [facility]’s FollowMyHealth portal, you will also be able to view your health information using other applications (apps) compatible with our system.

## 2021-04-16 NOTE — DISCHARGE NOTE PROVIDER - NSDCMRMEDTOKEN_GEN_ALL_CORE_FT
Acidophilus oral tablet: 1 tab(s) orally once a day  Centrum Silver oral tablet: 1 tab(s) orally once a day  citalopram 20 mg oral tablet: 1 tab(s) orally once a day  desmopressin 10 mcg/inh nasal spray: 1 spray in both nostrils in evening     note: rx has 3 sprays as per pt 1 spray  omeprazole 20 mg oral delayed release capsule: 1 cap(s) orally once a day  Vitamin C 1000 mg oral tablet: 1 tab(s) orally once a day  Vitamin D3 1000 intl units (25 mcg) oral tablet:    Acidophilus oral tablet: 1 tab(s) orally once a day  Centrum Silver oral tablet: 1 tab(s) orally once a day  citalopram 20 mg oral tablet: 1 tab(s) orally once a day  desmopressin 10 mcg/inh nasal spray: 1 spray in both nostrils in evening     note: rx has 3 sprays as per pt 1 spray  omeprazole 20 mg oral delayed release capsule: 1 cap(s) orally once a day  Outpatient physical therapy: For strengthening, balance, and gait training  Vitamin C 1000 mg oral tablet: 1 tab(s) orally once a day  Vitamin D3 1000 intl units (25 mcg) oral tablet:    Acidophilus oral tablet: 1 tab(s) orally once a day  Centrum Silver oral tablet: 1 tab(s) orally once a day  citalopram 20 mg oral tablet: 1 tab(s) orally once a day  desmopressin 10 mcg/inh nasal spray: 1 spray in both nostrils in evening     note: rx has 3 sprays as per pt 1 spray  omeprazole 20 mg oral delayed release capsule: 1 cap(s) orally once a day  Outpatient vestibular physical therapy: For vestibular physical therapy  Vitamin C 1000 mg oral tablet: 1 tab(s) orally once a day  Vitamin D3 1000 intl units (25 mcg) oral tablet:

## 2021-04-16 NOTE — PROGRESS NOTE ADULT - ASSESSMENT
65 year old male PMH pituitary adenoma s/p resection s/p 2 revisions with subsequent diabetes insipidus, vertigo p/w multiple complaints. Primary complaint is episode of dizziness with n/v today. Pt states he gets intermittent episodes of vertigo, described as room spinning, triggered by eye movement or head turning (unsure which direction). Today had an episode of vertigo c/w prior episodes, however it lasted for 1hr, which is longer than usual. No falls/head trauma, not on AC. No visual changes, numbness, tingling, weakness.     Plan: Recurrent vertigo with NDI. Will decrease IVF LR 60/h. Initial labs all WNL. CT head normal. Will continue home dose of Celexa 20 mg/day and DDAVP for NDI. Lytes all normal. MRI brain santosh contrast, stable pituitary, no new growth. Had 6 months of vestibular retraining with ENT with minimal benefit and two prior brain surgeries.  Likely episodes of vertigo are self-limited and will resolve on their own. Neuro exam is non-focal on arrival.     ID: No active infections, afebrile, COVID-19 negative, CXR clear. Request CT of neck with IV contrast today to eval for   cervical adenopathy, no lymph nodes seen on exam.   CT chest today non-contrast to eval for pneumonia. Bedside spirometry   as well.       Discharge home Friday if stable.  65 year old male PMH pituitary adenoma s/p resection s/p 2 revisions with subsequent diabetes insipidus, vertigo p/w multiple complaints. Primary complaint is episode of dizziness with n/v today. Pt states he gets intermittent episodes of vertigo, described as room spinning, triggered by eye movement or head turning (unsure which direction). Today had an episode of vertigo c/w prior episodes, however it lasted for 1hr, which is longer than usual. No falls/head trauma, not on AC. No visual changes, numbness, tingling, weakness.     Plan: Recurrent vertigo with NDI. Stop IVF. Initial labs all WNL. CT head normal. Will continue home dose of Celexa 20 mg/day and DDAVP for NDI. Lytes all normal. MRI brain santosh contrast, stable pituitary, no new growth. Had 6 months of vestibular retraining with ENT with minimal benefit and two prior brain surgeries.  Likely episodes of vertigo are self-limited and will resolve on their own. Neuro exam is non-focal on arrival.     ID: No active infections, afebrile, COVID-19 negative, CXR clear. Request CT of neck with IV contrast today to eval for   cervical adenopathy, no lymph nodes seen on exam.   CT chest normal. Bedside spirometry WNL.        Discharge home Friday if stable, after CT neck.

## 2022-08-23 NOTE — ED PROVIDER NOTE - CADM POA PRESS ULCER
Number Of Freeze-Thaw Cycles: 1 freeze-thaw cycle Render Post-Care Instructions In Note?: yes Post-Care Instructions: I reviewed with the patient in detail post-care instructions. Patient is to wear sun protection, and avoid picking at any of the treated lesions. Pt was advised to wash daily with gentle soap and water and may apply Vaseline to crusted or scabbing areas. Render Note In Bullet Format When Appropriate: No Consent: The patient's consent was obtained including but not limited to risks of crusting, scabbing, blistering, scarring, darker or lighter pigmentary change, recurrence, incomplete removal and infection. Detail Level: Simple Duration Of Freeze Thaw-Cycle (Seconds): 3 No

## 2022-09-23 ENCOUNTER — APPOINTMENT (OUTPATIENT)
Dept: CT IMAGING | Facility: CLINIC | Age: 67
End: 2022-09-23

## 2022-09-23 PROCEDURE — 74176 CT ABD & PELVIS W/O CONTRAST: CPT

## 2022-10-13 ENCOUNTER — APPOINTMENT (OUTPATIENT)
Dept: MRI IMAGING | Facility: CLINIC | Age: 67
End: 2022-10-13

## 2022-10-13 ENCOUNTER — OUTPATIENT (OUTPATIENT)
Dept: OUTPATIENT SERVICES | Facility: HOSPITAL | Age: 67
LOS: 1 days | End: 2022-10-13
Payer: COMMERCIAL

## 2022-10-13 DIAGNOSIS — R93.89 ABNORMAL FINDINGS ON DIAGNOSTIC IMAGING OF OTHER SPECIFIED BODY STRUCTURES: ICD-10-CM

## 2022-10-13 DIAGNOSIS — D35.2 BENIGN NEOPLASM OF PITUITARY GLAND: Chronic | ICD-10-CM

## 2022-10-13 PROCEDURE — 74183 MRI ABD W/O CNTR FLWD CNTR: CPT

## 2022-10-13 PROCEDURE — 74183 MRI ABD W/O CNTR FLWD CNTR: CPT | Mod: 26

## 2022-10-13 PROCEDURE — A9585: CPT

## 2022-10-28 NOTE — ED PROVIDER NOTE - NS ED ATTENDING STATEMENT MOD
89 yeat old patient admitted to [facility] with diagnosis of:  Alzheimer disease.  Family describe patient as very pleasantly confused the last 6 years & reluctant to allow family to stay while she was able to ambulate independently.  Today she is unable to sit maintain upright posture, speech is garbled, she is unable to follow simple commands, & unable to hold/manipulate silver ware for coordination of bite of food.  Daughter provides 24/7 care and is medical POA.    Uncomfortable due to pain?  NO    CPR discussion occurred and request voiced DNR    Other life sustaining measures besides CPR (IV/IV antibiotics, tube feeding, Dialysis, intubation) discussion occured and request no life sustaining measures    Hospitalization discussion occured and request no further hospitalizations    Spiritual/existential discussion occurred wtih daughter and denies any concerns    Dyspnea  no    Admission book & consents explained and instructions given on hospice availability and how to assecc nurse 24hr day.  Symptom managment medications orderd prophylaticaly due to anticipated decline.
I have personally seen and examined this patient.  I have fully participated in the care of this patient. I have reviewed all pertinent clinical information, including history, physical exam, plan and the Resident’s note and agree except as noted.

## 2022-11-08 ENCOUNTER — INPATIENT (INPATIENT)
Facility: HOSPITAL | Age: 67
LOS: 3 days | Discharge: HOME | DRG: 392 | End: 2022-11-12
Attending: GENERAL ACUTE CARE HOSPITAL | Admitting: GENERAL ACUTE CARE HOSPITAL
Payer: COMMERCIAL

## 2022-11-08 VITALS
DIASTOLIC BLOOD PRESSURE: 67 MMHG | WEIGHT: 190.04 LBS | HEIGHT: 72 IN | OXYGEN SATURATION: 100 % | TEMPERATURE: 98 F | RESPIRATION RATE: 20 BRPM | HEART RATE: 63 BPM | SYSTOLIC BLOOD PRESSURE: 124 MMHG

## 2022-11-08 DIAGNOSIS — K57.92 DIVERTICULITIS OF INTESTINE, PART UNSPECIFIED, WITHOUT PERFORATION OR ABSCESS WITHOUT BLEEDING: ICD-10-CM

## 2022-11-08 DIAGNOSIS — D35.2 BENIGN NEOPLASM OF PITUITARY GLAND: Chronic | ICD-10-CM

## 2022-11-08 LAB
ALBUMIN SERPL ELPH-MCNC: 4.4 G/DL — SIGNIFICANT CHANGE UP (ref 3.3–5)
ALP SERPL-CCNC: 71 U/L — SIGNIFICANT CHANGE UP (ref 40–120)
ALT FLD-CCNC: 19 U/L — SIGNIFICANT CHANGE UP (ref 10–45)
ANION GAP SERPL CALC-SCNC: 9 MMOL/L — SIGNIFICANT CHANGE UP (ref 5–17)
APPEARANCE UR: CLEAR — SIGNIFICANT CHANGE UP
APTT BLD: 34.1 SEC — SIGNIFICANT CHANGE UP (ref 27.5–35.5)
AST SERPL-CCNC: 20 U/L — SIGNIFICANT CHANGE UP (ref 10–40)
BACTERIA # UR AUTO: NEGATIVE — SIGNIFICANT CHANGE UP
BASE EXCESS BLDV CALC-SCNC: -0.2 MMOL/L — SIGNIFICANT CHANGE UP (ref -2–3)
BASOPHILS # BLD AUTO: 0.01 K/UL — SIGNIFICANT CHANGE UP (ref 0–0.2)
BASOPHILS NFR BLD AUTO: 0.3 % — SIGNIFICANT CHANGE UP (ref 0–2)
BILIRUB SERPL-MCNC: 0.6 MG/DL — SIGNIFICANT CHANGE UP (ref 0.2–1.2)
BILIRUB UR-MCNC: NEGATIVE — SIGNIFICANT CHANGE UP
BUN SERPL-MCNC: 21 MG/DL — SIGNIFICANT CHANGE UP (ref 7–23)
CA-I SERPL-SCNC: 1.18 MMOL/L — SIGNIFICANT CHANGE UP (ref 1.15–1.33)
CALCIUM SERPL-MCNC: 9.2 MG/DL — SIGNIFICANT CHANGE UP (ref 8.4–10.5)
CHLORIDE BLDV-SCNC: 91 MMOL/L — LOW (ref 96–108)
CHLORIDE SERPL-SCNC: 89 MMOL/L — LOW (ref 96–108)
CO2 BLDV-SCNC: 26 MMOL/L — SIGNIFICANT CHANGE UP (ref 22–26)
CO2 SERPL-SCNC: 24 MMOL/L — SIGNIFICANT CHANGE UP (ref 22–31)
COLOR SPEC: YELLOW — SIGNIFICANT CHANGE UP
CREAT ?TM UR-MCNC: 211 MG/DL — SIGNIFICANT CHANGE UP
CREAT SERPL-MCNC: 0.98 MG/DL — SIGNIFICANT CHANGE UP (ref 0.5–1.3)
DIFF PNL FLD: NEGATIVE — SIGNIFICANT CHANGE UP
EGFR: 85 ML/MIN/1.73M2 — SIGNIFICANT CHANGE UP
EOSINOPHIL # BLD AUTO: 0.03 K/UL — SIGNIFICANT CHANGE UP (ref 0–0.5)
EOSINOPHIL NFR BLD AUTO: 0.8 % — SIGNIFICANT CHANGE UP (ref 0–6)
EPI CELLS # UR: 1 /HPF — SIGNIFICANT CHANGE UP
GAS PNL BLDV: 119 MMOL/L — CRITICAL LOW (ref 136–145)
GAS PNL BLDV: SIGNIFICANT CHANGE UP
GLUCOSE BLDV-MCNC: 94 MG/DL — SIGNIFICANT CHANGE UP (ref 70–99)
GLUCOSE SERPL-MCNC: 92 MG/DL — SIGNIFICANT CHANGE UP (ref 70–99)
GLUCOSE UR QL: NEGATIVE — SIGNIFICANT CHANGE UP
HCO3 BLDV-SCNC: 25 MMOL/L — SIGNIFICANT CHANGE UP (ref 22–29)
HCT VFR BLD CALC: 38.3 % — LOW (ref 39–50)
HCT VFR BLDA CALC: 41 % — SIGNIFICANT CHANGE UP (ref 39–51)
HGB BLD CALC-MCNC: 13.8 G/DL — SIGNIFICANT CHANGE UP (ref 12.6–17.4)
HGB BLD-MCNC: 13.5 G/DL — SIGNIFICANT CHANGE UP (ref 13–17)
HYALINE CASTS # UR AUTO: 2 /LPF — SIGNIFICANT CHANGE UP (ref 0–2)
IMM GRANULOCYTES NFR BLD AUTO: 0.3 % — SIGNIFICANT CHANGE UP (ref 0–0.9)
INR BLD: 1.18 RATIO — HIGH (ref 0.88–1.16)
KETONES UR-MCNC: NEGATIVE — SIGNIFICANT CHANGE UP
LACTATE BLDV-MCNC: 0.8 MMOL/L — SIGNIFICANT CHANGE UP (ref 0.5–2)
LEUKOCYTE ESTERASE UR-ACNC: NEGATIVE — SIGNIFICANT CHANGE UP
LYMPHOCYTES # BLD AUTO: 0.69 K/UL — LOW (ref 1–3.3)
LYMPHOCYTES # BLD AUTO: 18.3 % — SIGNIFICANT CHANGE UP (ref 13–44)
MCHC RBC-ENTMCNC: 28.8 PG — SIGNIFICANT CHANGE UP (ref 27–34)
MCHC RBC-ENTMCNC: 35.2 GM/DL — SIGNIFICANT CHANGE UP (ref 32–36)
MCV RBC AUTO: 81.8 FL — SIGNIFICANT CHANGE UP (ref 80–100)
MONOCYTES # BLD AUTO: 0.36 K/UL — SIGNIFICANT CHANGE UP (ref 0–0.9)
MONOCYTES NFR BLD AUTO: 9.5 % — SIGNIFICANT CHANGE UP (ref 2–14)
NEUTROPHILS # BLD AUTO: 2.67 K/UL — SIGNIFICANT CHANGE UP (ref 1.8–7.4)
NEUTROPHILS NFR BLD AUTO: 70.8 % — SIGNIFICANT CHANGE UP (ref 43–77)
NITRITE UR-MCNC: NEGATIVE — SIGNIFICANT CHANGE UP
NRBC # BLD: 0 /100 WBCS — SIGNIFICANT CHANGE UP (ref 0–0)
OSMOLALITY SERPL: 259 MOSMOL/KG — LOW (ref 280–301)
OSMOLALITY UR: 860 MOS/KG — SIGNIFICANT CHANGE UP (ref 300–900)
PCO2 BLDV: 41 MMHG — LOW (ref 42–55)
PH BLDV: 7.39 — SIGNIFICANT CHANGE UP (ref 7.32–7.43)
PH UR: 6 — SIGNIFICANT CHANGE UP (ref 5–8)
PLATELET # BLD AUTO: 152 K/UL — SIGNIFICANT CHANGE UP (ref 150–400)
PO2 BLDV: 29 MMHG — SIGNIFICANT CHANGE UP (ref 25–45)
POTASSIUM BLDV-SCNC: 4.4 MMOL/L — SIGNIFICANT CHANGE UP (ref 3.5–5.1)
POTASSIUM SERPL-MCNC: 4.4 MMOL/L — SIGNIFICANT CHANGE UP (ref 3.5–5.3)
POTASSIUM SERPL-SCNC: 4.4 MMOL/L — SIGNIFICANT CHANGE UP (ref 3.5–5.3)
POTASSIUM UR-SCNC: 90 MMOL/L — SIGNIFICANT CHANGE UP
PROT ?TM UR-MCNC: 16 MG/DL — HIGH (ref 0–12)
PROT SERPL-MCNC: 7.5 G/DL — SIGNIFICANT CHANGE UP (ref 6–8.3)
PROT UR-MCNC: ABNORMAL
PROT/CREAT UR-RTO: 0.1 RATIO — SIGNIFICANT CHANGE UP (ref 0–0.2)
PROTHROM AB SERPL-ACNC: 13.6 SEC — HIGH (ref 10.5–13.4)
RBC # BLD: 4.68 M/UL — SIGNIFICANT CHANGE UP (ref 4.2–5.8)
RBC # FLD: 12.6 % — SIGNIFICANT CHANGE UP (ref 10.3–14.5)
RBC CASTS # UR COMP ASSIST: 3 /HPF — SIGNIFICANT CHANGE UP (ref 0–4)
SAO2 % BLDV: 48.5 % — LOW (ref 67–88)
SARS-COV-2 RNA SPEC QL NAA+PROBE: SIGNIFICANT CHANGE UP
SODIUM SERPL-SCNC: 122 MMOL/L — LOW (ref 135–145)
SODIUM UR-SCNC: 74 MMOL/L — SIGNIFICANT CHANGE UP
SP GR SPEC: 1.03 — HIGH (ref 1.01–1.02)
UROBILINOGEN FLD QL: NEGATIVE — SIGNIFICANT CHANGE UP
UUN UR-MCNC: 1324 MG/DL — SIGNIFICANT CHANGE UP
WBC # BLD: 3.77 K/UL — LOW (ref 3.8–10.5)
WBC # FLD AUTO: 3.77 K/UL — LOW (ref 3.8–10.5)
WBC UR QL: 3 /HPF — SIGNIFICANT CHANGE UP (ref 0–5)

## 2022-11-08 PROCEDURE — 99285 EMERGENCY DEPT VISIT HI MDM: CPT

## 2022-11-08 PROCEDURE — 74177 CT ABD & PELVIS W/CONTRAST: CPT | Mod: 26,MA

## 2022-11-08 PROCEDURE — 76705 ECHO EXAM OF ABDOMEN: CPT | Mod: 26

## 2022-11-08 RX ORDER — PIPERACILLIN AND TAZOBACTAM 4; .5 G/20ML; G/20ML
3.38 INJECTION, POWDER, LYOPHILIZED, FOR SOLUTION INTRAVENOUS ONCE
Refills: 0 | Status: COMPLETED | OUTPATIENT
Start: 2022-11-09 | End: 2022-11-09

## 2022-11-08 RX ORDER — ASCORBIC ACID 60 MG
1 TABLET,CHEWABLE ORAL
Qty: 0 | Refills: 0 | DISCHARGE

## 2022-11-08 RX ORDER — CHOLECALCIFEROL (VITAMIN D3) 125 MCG
0 CAPSULE ORAL
Qty: 0 | Refills: 0 | DISCHARGE

## 2022-11-08 RX ORDER — PIPERACILLIN AND TAZOBACTAM 4; .5 G/20ML; G/20ML
3.38 INJECTION, POWDER, LYOPHILIZED, FOR SOLUTION INTRAVENOUS ONCE
Refills: 0 | Status: COMPLETED | OUTPATIENT
Start: 2022-11-08 | End: 2022-11-08

## 2022-11-08 RX ORDER — PIPERACILLIN AND TAZOBACTAM 4; .5 G/20ML; G/20ML
3.38 INJECTION, POWDER, LYOPHILIZED, FOR SOLUTION INTRAVENOUS EVERY 8 HOURS
Refills: 0 | Status: DISCONTINUED | OUTPATIENT
Start: 2022-11-09 | End: 2022-11-09

## 2022-11-08 RX ORDER — PIPERACILLIN AND TAZOBACTAM 4; .5 G/20ML; G/20ML
3.38 INJECTION, POWDER, LYOPHILIZED, FOR SOLUTION INTRAVENOUS ONCE
Refills: 0 | Status: DISCONTINUED | OUTPATIENT
Start: 2022-11-09 | End: 2022-11-09

## 2022-11-08 RX ORDER — PIPERACILLIN AND TAZOBACTAM 4; .5 G/20ML; G/20ML
3.38 INJECTION, POWDER, LYOPHILIZED, FOR SOLUTION INTRAVENOUS EVERY 8 HOURS
Refills: 0 | Status: DISCONTINUED | OUTPATIENT
Start: 2022-11-08 | End: 2022-11-09

## 2022-11-08 RX ORDER — TAMSULOSIN HYDROCHLORIDE 0.4 MG/1
0.4 CAPSULE ORAL AT BEDTIME
Refills: 0 | Status: DISCONTINUED | OUTPATIENT
Start: 2022-11-08 | End: 2022-11-12

## 2022-11-08 RX ORDER — LACTOBACILLUS ACIDOPHILUS 100MM CELL
1 CAPSULE ORAL
Qty: 0 | Refills: 0 | DISCHARGE

## 2022-11-08 RX ORDER — MULTIVIT-MIN/FERROUS GLUCONATE 9 MG/15 ML
1 LIQUID (ML) ORAL
Qty: 0 | Refills: 0 | DISCHARGE

## 2022-11-08 RX ADMIN — TAMSULOSIN HYDROCHLORIDE 0.4 MILLIGRAM(S): 0.4 CAPSULE ORAL at 20:45

## 2022-11-08 RX ADMIN — PIPERACILLIN AND TAZOBACTAM 200 GRAM(S): 4; .5 INJECTION, POWDER, LYOPHILIZED, FOR SOLUTION INTRAVENOUS at 22:00

## 2022-11-08 NOTE — ED ADULT TRIAGE NOTE - PATIENT ON (OXYGEN DELIVERY METHOD)
Patient reports that she had generalized body aches, nasal congestion, productive cough, and head pressure.  Pt took tylenol last night with no relief.   room air

## 2022-11-08 NOTE — ED ADULT NURSE REASSESSMENT NOTE - NS ED NURSE REASSESS COMMENT FT1
Patient alert and oriented, sitting up in bed watching a movie. Patient has no complaints. Admits to taking flomax from home at 2000. Vitals stable. Spoke to patient's wife as she was concerned for him being in the hallway and not receiving his ABX. Patient and wife made aware his next dose of ABX is due for 2200. Bed locked and in lowest position for safety.

## 2022-11-08 NOTE — ED PROVIDER NOTE - CLINICAL SUMMARY MEDICAL DECISION MAKING FREE TEXT BOX
65 yo M with hx of pituitary adenoma (s/p resection and s/p 2 revisions with subsequent diabetes insipidus), vertigo and diverticulitis presenting with abdominal pain and low sodium on outpatient labs. On day 2 of oral abx. Mild dizziness, no headache, nausea, vomiting. Well appearing, neuro intact, abdomen soft with tenderness on L sided, no rebound or guarding. Will repeat electrolytes, CTAP to assess for extent of diverticulitis and r/o abscess and dispo pending workup. 65 yo M with hx of pituitary adenoma (s/p resection and s/p 2 revisions with subsequent diabetes insipidus), vertigo and diverticulitis presenting with abdominal pain and low sodium on outpatient labs. On day 2 of oral abx. Mild dizziness, no headache, nausea, vomiting. Well appearing, neuro intact, abdomen soft with tenderness on L sided, no rebound or guarding. Will repeat electrolytes, CTAP to assess for extent of diverticulitis and r/o abscess and dispo pending workup.  Attending Keke Bauman: 65 yo male h/o pituiatary adenoma with prior resection, and diabetes insipidus on ddavp prsenting with abdominal pain and concern for low sodium on o/p labs. pt does endorse drinking large volumes which is likely contributing. will obtain labs, including osm, and urine studies,as well as ct scan to further evaluate for intra abdominal infection. non focal neurologic exam. will likely need fluid restriction

## 2022-11-08 NOTE — ED PROVIDER NOTE - NS ED ROS FT
Constitutional:  (-) fever, (-) chills, (+) fatigue  Eyes:  (-) eye pain (-) visual changes  ENMT: (-) nasal discharge, (-) sore throat. (-) neck pain or stiffness  Cardiac: (-) chest pain (-) palpitations  Respiratory:  (-) cough (-) shortness of breath  GI:  (-) nausea (-) vomiting (+) diarrhea (+) abdominal pain  :  (-) dysuria (-) frequency (-) burning  MS:  (-) back pain (-) joint pain  Neuro:  (-) headache (-) numbness (-) tingling (-) focal weakness  Skin:  (-) rash  Except as documented in the HPI,  all other systems are negative

## 2022-11-08 NOTE — ED PROVIDER NOTE - ATTENDING CONTRIBUTION TO CARE
Attending MD Keke Bauman:  I personally have seen and examined this patient.  Resident note reviewed and agree on plan of care and except where noted.  See HPI, PE, and MDM for details.

## 2022-11-08 NOTE — ED ADULT NURSE NOTE - OBJECTIVE STATEMENT
Pt is a 66 year old male with recurrent abd pain, (recent dx: diverticulitis), started on augmentin, dec NA: 120 on outpt labs.  Pt   abd pain, hx diverticulitis, Na level: 120

## 2022-11-08 NOTE — H&P ADULT - HISTORY OF PRESENT ILLNESS
66 year old man with a PMH of pituitary adenoma, DI and diverticulitis, sent to ED by  mfor diverticulitis and hyponatremia.   Last week he was ill with the flu > recovered but then on the evening of 11/06 he developed LLQ pain, nausea and diarrhea, similar to prior episode of diverticulitis in sept /2022. He was started on PO Augmentin by his PCP but subsequent labs showed a Na of 119 prompting visit to the ED.   Overall feels better    No fever / chills, vomiting   Labs notable for Na 122.

## 2022-11-08 NOTE — H&P ADULT - ASSESSMENT
66 year old man with a PMH of pituitary adenoma, DI and diverticulitis, sent to ED by  mfor diverticulitis and hyponatremia.   Last week he was ill with the flu > recovered but then on the evening of 11/06 he developed LLQ pain, nausea and diarrhea, similar to prior episode of diverticulitis in sept /2022. He was started on PO Augmentin by his PCP but subsequent labs showed a Na of 119 prompting visit to the ED.   Overall feels better    No fever / chills, vomiting   Labs notable for Na 122.      hyponatremia : will hold desmopressin   hold citaoplram for now   renal consult   flluid restriction for now to 800 cc     Diverticulitis : iv abx for now

## 2022-11-08 NOTE — ED PROVIDER NOTE - OBJECTIVE STATEMENT
65 yo M with hx of pituitary adenoma (s/p resection and s/p 2 revisions with subsequent diabetes insipidus), vertigo and diverticulitis presenting with abdominal pain and low sodium on outpatient labs. Patient reports several days of L sided abdominal pain and diarrhea with mucous. He was started on Augmentin by his PMD and pain has been improving. Denies fevers/chills, nausea, vomiting. Patient had blood work done and called today for low sodium (124). Has had mild dizziness, similar to previous episodes of vertigo. Denies headache, vision changes, urinary symptoms. Also tested positive for flu 1 week ago, initially had cough and myalgias, improving now. 65 yo M with hx of pituitary adenoma (s/p resection and s/p 2 revisions with subsequent diabetes insipidus), vertigo and diverticulitis presenting with abdominal pain and low sodium on outpatient labs. Patient reports several days of L sided abdominal pain and diarrhea with mucous. Described as aching pain, 3/10, worse with pressure. Has not taken anything for pain and declined pain medication today. He was started on Augmentin by his PMD and pain has been improving. Denies fevers/chills, nausea, vomiting. Patient had blood work done and called today for low sodium (124). Has had mild dizziness, similar to previous episodes of vertigo. Denies headache, vision changes, urinary symptoms. Also tested positive for flu 1 week ago, initially had cough and myalgias, improving now. 67 yo M with hx of pituitary adenoma (s/p resection and s/p 2 revisions with subsequent diabetes insipidus), vertigo and diverticulitis presenting with abdominal pain and low sodium on outpatient labs. Patient reports several days of L sided abdominal pain and diarrhea with mucous. Described as aching pain, 3/10, worse with pressure. Has not taken anything for pain and declined pain medication today. He was started on Augmentin yesterday by his PMD and pain has been somewhat improving but persistent. Denies fevers/chills, nausea, vomiting. Patient had blood work done and called today for low sodium (124). Has had mild dizziness, similar to previous episodes of vertigo. Denies headache, vision changes, urinary symptoms. Also tested positive for flu 1 week ago, initially had cough and myalgias, improving now.    PMD: Dr. John Ng

## 2022-11-09 LAB
ANION GAP SERPL CALC-SCNC: 11 MMOL/L — SIGNIFICANT CHANGE UP (ref 5–17)
ANION GAP SERPL CALC-SCNC: 11 MMOL/L — SIGNIFICANT CHANGE UP (ref 5–17)
ANION GAP SERPL CALC-SCNC: 13 MMOL/L — SIGNIFICANT CHANGE UP (ref 5–17)
BUN SERPL-MCNC: 16 MG/DL — SIGNIFICANT CHANGE UP (ref 7–23)
BUN SERPL-MCNC: 17 MG/DL — SIGNIFICANT CHANGE UP (ref 7–23)
BUN SERPL-MCNC: 22 MG/DL — SIGNIFICANT CHANGE UP (ref 7–23)
CALCIUM SERPL-MCNC: 8.9 MG/DL — SIGNIFICANT CHANGE UP (ref 8.4–10.5)
CALCIUM SERPL-MCNC: 9 MG/DL — SIGNIFICANT CHANGE UP (ref 8.4–10.5)
CALCIUM SERPL-MCNC: 9.2 MG/DL — SIGNIFICANT CHANGE UP (ref 8.4–10.5)
CHLORIDE SERPL-SCNC: 89 MMOL/L — LOW (ref 96–108)
CHLORIDE SERPL-SCNC: 92 MMOL/L — LOW (ref 96–108)
CHLORIDE SERPL-SCNC: 93 MMOL/L — LOW (ref 96–108)
CO2 SERPL-SCNC: 19 MMOL/L — LOW (ref 22–31)
CO2 SERPL-SCNC: 22 MMOL/L — SIGNIFICANT CHANGE UP (ref 22–31)
CO2 SERPL-SCNC: 23 MMOL/L — SIGNIFICANT CHANGE UP (ref 22–31)
CREAT SERPL-MCNC: 1.05 MG/DL — SIGNIFICANT CHANGE UP (ref 0.5–1.3)
CREAT SERPL-MCNC: 1.24 MG/DL — SIGNIFICANT CHANGE UP (ref 0.5–1.3)
CREAT SERPL-MCNC: 1.45 MG/DL — HIGH (ref 0.5–1.3)
EGFR: 53 ML/MIN/1.73M2 — LOW
EGFR: 64 ML/MIN/1.73M2 — SIGNIFICANT CHANGE UP
EGFR: 78 ML/MIN/1.73M2 — SIGNIFICANT CHANGE UP
GLUCOSE SERPL-MCNC: 107 MG/DL — HIGH (ref 70–99)
GLUCOSE SERPL-MCNC: 84 MG/DL — SIGNIFICANT CHANGE UP (ref 70–99)
GLUCOSE SERPL-MCNC: 94 MG/DL — SIGNIFICANT CHANGE UP (ref 70–99)
POTASSIUM SERPL-MCNC: 3.4 MMOL/L — LOW (ref 3.5–5.3)
POTASSIUM SERPL-MCNC: 3.5 MMOL/L — SIGNIFICANT CHANGE UP (ref 3.5–5.3)
POTASSIUM SERPL-MCNC: 3.7 MMOL/L — SIGNIFICANT CHANGE UP (ref 3.5–5.3)
POTASSIUM SERPL-SCNC: 3.4 MMOL/L — LOW (ref 3.5–5.3)
POTASSIUM SERPL-SCNC: 3.5 MMOL/L — SIGNIFICANT CHANGE UP (ref 3.5–5.3)
POTASSIUM SERPL-SCNC: 3.7 MMOL/L — SIGNIFICANT CHANGE UP (ref 3.5–5.3)
SODIUM SERPL-SCNC: 121 MMOL/L — LOW (ref 135–145)
SODIUM SERPL-SCNC: 126 MMOL/L — LOW (ref 135–145)
SODIUM SERPL-SCNC: 126 MMOL/L — LOW (ref 135–145)

## 2022-11-09 RX ORDER — DESMOPRESSIN ACETATE 0.1 MG/1
0.5 TABLET ORAL ONCE
Refills: 0 | Status: DISCONTINUED | OUTPATIENT
Start: 2022-11-09 | End: 2022-11-09

## 2022-11-09 RX ORDER — POTASSIUM CHLORIDE 20 MEQ
40 PACKET (EA) ORAL ONCE
Refills: 0 | Status: COMPLETED | OUTPATIENT
Start: 2022-11-09 | End: 2022-11-10

## 2022-11-09 RX ORDER — INFLUENZA VIRUS VACCINE 15; 15; 15; 15 UG/.5ML; UG/.5ML; UG/.5ML; UG/.5ML
0.7 SUSPENSION INTRAMUSCULAR ONCE
Refills: 0 | Status: DISCONTINUED | OUTPATIENT
Start: 2022-11-09 | End: 2022-11-12

## 2022-11-09 RX ORDER — PIPERACILLIN AND TAZOBACTAM 4; .5 G/20ML; G/20ML
3.38 INJECTION, POWDER, LYOPHILIZED, FOR SOLUTION INTRAVENOUS ONCE
Refills: 0 | Status: COMPLETED | OUTPATIENT
Start: 2022-11-09 | End: 2022-11-09

## 2022-11-09 RX ORDER — PIPERACILLIN AND TAZOBACTAM 4; .5 G/20ML; G/20ML
3.38 INJECTION, POWDER, LYOPHILIZED, FOR SOLUTION INTRAVENOUS EVERY 8 HOURS
Refills: 0 | Status: DISCONTINUED | OUTPATIENT
Start: 2022-11-09 | End: 2022-11-09

## 2022-11-09 RX ORDER — PIPERACILLIN AND TAZOBACTAM 4; .5 G/20ML; G/20ML
3.38 INJECTION, POWDER, LYOPHILIZED, FOR SOLUTION INTRAVENOUS EVERY 8 HOURS
Refills: 0 | Status: DISCONTINUED | OUTPATIENT
Start: 2022-11-09 | End: 2022-11-12

## 2022-11-09 RX ADMIN — PIPERACILLIN AND TAZOBACTAM 25 GRAM(S): 4; .5 INJECTION, POWDER, LYOPHILIZED, FOR SOLUTION INTRAVENOUS at 09:00

## 2022-11-09 RX ADMIN — PIPERACILLIN AND TAZOBACTAM 25 GRAM(S): 4; .5 INJECTION, POWDER, LYOPHILIZED, FOR SOLUTION INTRAVENOUS at 17:36

## 2022-11-09 RX ADMIN — TAMSULOSIN HYDROCHLORIDE 0.4 MILLIGRAM(S): 0.4 CAPSULE ORAL at 21:39

## 2022-11-09 RX ADMIN — PIPERACILLIN AND TAZOBACTAM 25 GRAM(S): 4; .5 INJECTION, POWDER, LYOPHILIZED, FOR SOLUTION INTRAVENOUS at 01:00

## 2022-11-09 NOTE — PROGRESS NOTE ADULT - ASSESSMENT
66 year old man with a PMH of pituitary adenoma, DI and diverticulitis, sent to ED by  mfor diverticulitis and hyponatremia.   Last week he was ill with the flu > recovered but then on the evening of 11/06 he developed LLQ pain, nausea and diarrhea, similar to prior episode of diverticulitis in sept /2022. He was started on PO Augmentin by his PCP but subsequent labs showed a Na of 119 prompting visit to the ED.   Overall feels better    No fever / chills, vomiting   Labs notable for Na 122.      hyponatremia : restart at lower dose desmopressin   hold citaoplram for now   renal consult apppreciated     flluid restriction for now to 800 cc     Diverticulitis : iv abx for now

## 2022-11-09 NOTE — CONSULT NOTE ADULT - ASSESSMENT
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 Nephrology consulted for hyponatremia.    1) Hyponatremia: Due to excessive fluid intake in setting of high ADH state on DDAVP. Last dose of DDAVP on 11/8 AM. Patient at risk of overcorrection of serum Na and polyuria as DDAVP eliminated. Please repeat serum Na at 12PM and again this evening to monitor for overcorrection. Continue with fluid restriction at this time but will liberalize pending lab results. Monitor serum Na.    2) Central DI: On DDAVP 10 mcg daily. Will likely resume but reduce to 5 mcg daily this evening to prevent polyuria overnight with goal urine osm 400. Monitor serum Na.    3) HTN: BP controlled. Monitor off medications.    4) Diverticulitis: On Zosyn. Change to NS base to limit free water given hyponatremia on labs.      [facility] NEPHROLOGY  Marbin Soares M.D.  Tylor Todd D.O.  Jennifer Vizcaino M.D.  Jesi Chakraborty, ANGELINE, ANP-C    Telephone: [phone]  Facsimile: [phone]    [address] [place] [id number]  
1. Diverticulitis  - CT A/P shows mild sigmoid diverticulitis, no abscess or microperforation.   - IV abx   - low fiber diet   - Colonoscopy in 4-6 weeks   - he has a follow up appt with his GI scheduled for later this week    2. Hyponatremia, Diabetes insipidus   - Na 119 > 122  - consider endocrine consult      I had a prolonged conversation with the patient regarding the hospital course, differential diagnosis, results of diagnostic tests this far, and therapeutic modalities available. Plan of care discussed with the patient after the evaluation. Patient expresses a clear understanding of the plan of care. Sixty five minutes spent on the total encounter, of which more than fifty percent of the encounter was spent on counseling and/or coordinating care by the attending physician.    Advanced care planning forms were discussed. Code status including forceful chest compressions, defibrillation and intubation were discussed. The risks benefits and alternatives to pertinent gastrointestinal procedures and interventions were discussed in detail and all questions were answered. Duration: 15 Minutes.    Sari Harrison M.D.   Gastroenterology and Hepatology  [address] [place], [place]  Office: [phone]  Cell: [phone]

## 2022-11-09 NOTE — PROGRESS NOTE ADULT - ASSESSMENT
1. Diverticulitis  - CT A/P shows mild sigmoid diverticulitis, no abscess or microperforation.   - cw zosyn  - low fiber diet   - Colonoscopy in 4-6 weeks     2. Hyponatremia, Diabetes insipidus   - Na 121 today  - per nephro       Attending supervision statement: I have personally seen and examined the patient. I fully participated in the care of this patient. I have made amendments to the documentation where necessary, and agree with the history, physical exam, and plan as outlined by the ACP.    [facility]   Gastroenterology and Hepatology  [address] [place], [place]  Office: [phone]  Cell: [phone] 1. Diverticulitis, uncomplicatted  - CT A/P shows mild sigmoid diverticulitis, no abscess or microperforation.   - cw zosyn  - low fiber diet   - Colonoscopy in 4-6 weeks     2. Hyponatremia,   - Na 121 today  - per nephro , on fluid restriction    3. Diabetes insipidus, holding DDAVP    4. Leukopenia, mild in the setting of infection  -trend CBC    5. Hepatic hemangiomas, likely benign  -outpatient GI follow up    Attending supervision statement: I have personally seen and examined the patient. I fully participated in the care of this patient. I have made amendments to the documentation where necessary, and agree with the history, physical exam, and plan as outlined by the ACP.    I had a prolonged conversation with the patient regarding the hospital course, differential diagnosis, results of diagnostic tests this far, and therapeutic modalities available. Plan of care discussed with the patient after the evaluation. Patient expresses a clear understanding of the plan of care.  Sixty five minutes spent on the total encounter, of which more than fifty percent of the encounter was spent on counseling and/or coordinating care by the attending physician.      [facility]  Shant Harrison M.D.   [address] [place], [place]  Office: [phone]

## 2022-11-09 NOTE — CONSULT NOTE ADULT - SUBJECTIVE AND OBJECTIVE BOX
[facility] NEPHROLOGY- CONSULTATION NOTE    66y Male with history of below presents with abdominal pain found to be hyponatremic. Nephrology consulted for hyponatremia.    Patient with h/o central DI on intranasal DDAVP 10 mcg/daily which he has been on for years without changes as managed by Endocrinology. Patient admits to excessive fluid intake and poor solute intake during the last few days. Patient denies any vomiting but admits to diarrhea.    Patient also on SSRI as an outpatient which was held on admission. Patient states last dose of DDAVP on 118 AM.    REVIEW OF SYSTEMS:  Gen: no fevers  HEENT: no rhinorrhea  Neck: no sore throat  Cards: no chest pain  Resp: no dyspnea  GI: no nausea or vomiting, + diarrhea, + ab pain  : no dysuria or hematuria, + polyuria  Vascular: no LE edema  Derm: no rashes  Neuro: no numbness/tingling    No Known Allergies      Home Medications Reviewed  Hospital Medications:   MEDICATIONS  (STANDING):  influenza  Vaccine (HIGH DOSE) 0.7 milliLiter(s) IntraMuscular once  piperacillin/tazobactam IVPB.. 3.375 Gram(s) IV Intermittent every 8 hours  tamsulosin 0.4 milliGRAM(s) Oral at bedtime      PAST MEDICAL & SURGICAL HISTORY:  Pituitary adenoma      Diabetes insipidus      Adenoma of pituitary          FAMILY HISTORY:  No pertinent family history in first degree relatives        SOCIAL HISTORY:  Denies toxic substance use     VITALS:  T(F): 97.7 (22 @ 04:12), Max: 98.5 (22 @ 20:47)  HR: 54 (22 @ 04:12)  BP: 130/72 (22 @ 04:12)  RR: 18 (22 @ 04:12)  SpO2: 100% (22 @ 04:12)  Wt(kg): --        PHYSICAL EXAM:  Gen: NAD, calm  HEENT: MMM  Neck: no JVD  Cards: RRR, +S1/S2, no M/G/R  Resp: CTA B/L  GI: soft, + TTP in lower quadrant, ND, NABS  : no CVA tenderness  Vascular: no LE edema B/L  Derm: no rashes  Neuro: non-focal    LABS:      121<L>  |  89<L>  |  17  ----------------------------<  84  3.5   |  19<L>  |  1.05    Ca    8.9      2022 07:18  Mg     1.9         TPro  7.5  /  Alb  4.4  /  TBili  0.6  /  DBili      /  AST  20  /  ALT  19  /  AlkPhos  71      Creatinine Trend: 1.05 <--, 0.98 <--                        13.5   3.77  )-----------( 152      ( 2022 11:32 )             38.3     Urine Studies:  Urinalysis Basic - ( 2022 14:05 )    Color: Yellow / Appearance: Clear / S.031 / pH:   Gluc:  / Ketone: Negative  / Bili: Negative / Urobili: Negative   Blood:  / Protein: 30 mg/dL / Nitrite: Negative   Leuk Esterase: Negative / RBC: 3 /hpf / WBC 3 /HPF   Sq Epi:  / Non Sq Epi: 1 /hpf / Bacteria: Negative      Osmolality, Random Urine: 860 mos/kg ( @ 14:05)  Sodium, Random Urine: 74 mmol/L ( @ 14:05)  Creatinine, Random Urine: 211 mg/dL ( @ 14:05)  Protein/Creatinine Ratio Calculation: 0.1 Ratio ( @ 14:05)  Potassium, Random Urine: 90 mmol/L ( @ 14:05)      RADIOLOGY & ADDITIONAL STUDIES:    < from: CT Abdomen and Pelvis w/ IV Cont (22 @ 12:14) >  IMPRESSION:  Extensive sigmoid diverticulosis with mild stranding in the perisigmoid   fat and trace pelvic fluid, may represent a mild acute diverticulitis.        --- End of Report ---    < end of copied text >      < from: CT Abdomen and Pelvis w/ IV Cont (22 @ 12:14) >  KIDNEYS/URETERS: Bilateral subcentimeter hypodensities too small to   characterize. No hydronephrosis.    BLADDER: Within normal limits.    < end of copied text >  
Chief Complaint:  Patient is a 66y old  Male who presents with a chief complaint of     Date of service: 22 @ 14:43    HPI:    The patient is a 66 year old man with a PMH of pituitary adenoma, DI and diverticulitis, presenting for diverticulitis and hyponatremia. He had his first episode of uncomplicated diverticulitis in 2022, resolved with outpatient abx. Last week he was ill with the flu > recovered but then on the evening of  he developed LLQ pain, nausea and diarrhea, similar to prior episode of diverticulitis. He was started on PO Augmentin by his PCP but subsequent labs showed a Na of 119 prompting visit to the ED.     Currently he reports improvement in abdominal pain, now 4/10. No fever / chills, vomiting or blood in his stool. Last Colonoscopy ~ 5 yrs ago, due for repeat. No FH colon cancer. Labs notable for Na 122.    The patient denies dysphagia, nausea and vomiting, unintentional weight loss, or NSAID use.      Allergies:  No Known Allergies      Home Medications:    Hospital Medications:      PMHX/PSHX:  Pituitary adenoma    Diabetes insipidus    Adenoma of pituitary        Family history:  No pertinent family history in first degree relatives        Social History:   Denies ethanol use.  Denies illicit drug use.    ROS:     General:  No wt loss, fevers, chills, night sweats, fatigue,   Eyes:  Good vision, no reported pain  ENT:  No sore throat, pain, runny nose, dysphagia  CV:  No pain, palpitations, hypo/hypertension  Resp:  No dyspnea, cough, tachypnea, wheezing  GI:  See HPI  :  No pain, bleeding, incontinence, nocturia  Muscle:  No pain, weakness  Neuro:  No weakness, tingling, memory problems  Psych:  No fatigue, insomnia, mood problems, depression  Endocrine:  No polyuria, polydipsia, cold/heat intolerance  Heme:  No petechiae, ecchymosis, easy bruisability  Integumentary:  No rash, edema      PHYSICAL EXAM:     GENERAL:  Appears stated age, well-groomed, well-nourished, no distress  HEENT:  NC/AT,  conjunctivae anicteric, clear and pink,   NECK: supple, trachea midline  CHEST:  Full & symmetric excursion, no increased effort, breath sounds clear  HEART:  Regular rhythm, no JVD  ABDOMEN:  Soft, non-tender, non-distended, normoactive bowel sounds,  no masses , no hepatosplenomegaly  EXTREMITIES:  no cyanosis,clubbing or edema  SKIN:  No rash, erythema, or, ecchymoses, no jaundice  NEURO:  Alert, non-focal, no asterixis  PSYCH: Appropriate affect, oriented to place and time  RECTAL: Deferred      Vital Signs:  Vital Signs Last 24 Hrs  T(C): 36.7 (2022 10:09), Max: 36.7 (2022 10:09)  T(F): 98 (2022 10:09), Max: 98 (2022 10:09)  HR: 63 (2022 10:09) (63 - 63)  BP: 124/67 (2022 10:09) (124/67 - 124/67)  BP(mean): --  RR: 20 (2022 10:09) (20 - 20)  SpO2: 100% (2022 10:09) (100% - 100%)    Parameters below as of 2022 10:09  Patient On (Oxygen Delivery Method): room air      Daily Height in cm: 182.88 (2022 10:09)    Daily     LABS: Labs personally reviewed by me:                        13.5   3.77  )-----------( 152      ( 2022 11:32 )             38.3     11-    122<L>  |  89<L>  |  21  ----------------------------<  92  4.4   |  24  |  0.98    Ca    9.2      2022 11:32  Mg     1.9     11-    TPro  7.5  /  Alb  4.4  /  TBili  0.6  /  DBili  x   /  AST  20  /  ALT  19  /  AlkPhos  71  11-08    LIVER FUNCTIONS - ( 2022 11:32 )  Alb: 4.4 g/dL / Pro: 7.5 g/dL / ALK PHOS: 71 U/L / ALT: 19 U/L / AST: 20 U/L / GGT: x           PT/INR - ( 2022 11:32 )   PT: 13.6 sec;   INR: 1.18 ratio         PTT - ( 2022 11:32 )  PTT:34.1 sec  Urinalysis Basic - ( 2022 14:05 )    Color: Yellow / Appearance: Clear / S.031 / pH: x  Gluc: x / Ketone: Negative  / Bili: Negative / Urobili: Negative   Blood: x / Protein: 30 mg/dL / Nitrite: Negative   Leuk Esterase: Negative / RBC: 3 /hpf / WBC 3 /HPF   Sq Epi: x / Non Sq Epi: 1 /hpf / Bacteria: Negative          Imaging personally reviewed by me:          
Patient seen and examined in ED. History of diverticulitis in September and was treated with PO antibiotics. Now presenting with lower abdominal pain, mostly llq, and diarrhea. Sent by pcp to r/o diverticulitis. Pending read on CT A/P. GI will follow. Full note to follow.

## 2022-11-10 LAB
ANION GAP SERPL CALC-SCNC: 10 MMOL/L — SIGNIFICANT CHANGE UP (ref 5–17)
ANION GAP SERPL CALC-SCNC: 11 MMOL/L — SIGNIFICANT CHANGE UP (ref 5–17)
BUN SERPL-MCNC: 18 MG/DL — SIGNIFICANT CHANGE UP (ref 7–23)
BUN SERPL-MCNC: 19 MG/DL — SIGNIFICANT CHANGE UP (ref 7–23)
CALCIUM SERPL-MCNC: 8.8 MG/DL — SIGNIFICANT CHANGE UP (ref 8.4–10.5)
CALCIUM SERPL-MCNC: 9.4 MG/DL — SIGNIFICANT CHANGE UP (ref 8.4–10.5)
CHLORIDE SERPL-SCNC: 94 MMOL/L — LOW (ref 96–108)
CHLORIDE SERPL-SCNC: 99 MMOL/L — SIGNIFICANT CHANGE UP (ref 96–108)
CO2 SERPL-SCNC: 22 MMOL/L — SIGNIFICANT CHANGE UP (ref 22–31)
CO2 SERPL-SCNC: 24 MMOL/L — SIGNIFICANT CHANGE UP (ref 22–31)
CREAT SERPL-MCNC: 1.25 MG/DL — SIGNIFICANT CHANGE UP (ref 0.5–1.3)
CREAT SERPL-MCNC: 1.37 MG/DL — HIGH (ref 0.5–1.3)
EGFR: 57 ML/MIN/1.73M2 — LOW
EGFR: 64 ML/MIN/1.73M2 — SIGNIFICANT CHANGE UP
GLUCOSE SERPL-MCNC: 102 MG/DL — HIGH (ref 70–99)
GLUCOSE SERPL-MCNC: 83 MG/DL — SIGNIFICANT CHANGE UP (ref 70–99)
HCT VFR BLD CALC: 38.6 % — LOW (ref 39–50)
HGB BLD-MCNC: 13.8 G/DL — SIGNIFICANT CHANGE UP (ref 13–17)
MAGNESIUM SERPL-MCNC: 2 MG/DL — SIGNIFICANT CHANGE UP (ref 1.6–2.6)
MCHC RBC-ENTMCNC: 29.2 PG — SIGNIFICANT CHANGE UP (ref 27–34)
MCHC RBC-ENTMCNC: 35.8 GM/DL — SIGNIFICANT CHANGE UP (ref 32–36)
MCV RBC AUTO: 81.8 FL — SIGNIFICANT CHANGE UP (ref 80–100)
NRBC # BLD: 0 /100 WBCS — SIGNIFICANT CHANGE UP (ref 0–0)
OSMOLALITY UR: 864 MOS/KG — SIGNIFICANT CHANGE UP (ref 300–900)
PHOSPHATE SERPL-MCNC: 2.7 MG/DL — SIGNIFICANT CHANGE UP (ref 2.5–4.5)
PLATELET # BLD AUTO: 178 K/UL — SIGNIFICANT CHANGE UP (ref 150–400)
POTASSIUM SERPL-MCNC: 4.2 MMOL/L — SIGNIFICANT CHANGE UP (ref 3.5–5.3)
POTASSIUM SERPL-MCNC: 4.2 MMOL/L — SIGNIFICANT CHANGE UP (ref 3.5–5.3)
POTASSIUM SERPL-SCNC: 4.2 MMOL/L — SIGNIFICANT CHANGE UP (ref 3.5–5.3)
POTASSIUM SERPL-SCNC: 4.2 MMOL/L — SIGNIFICANT CHANGE UP (ref 3.5–5.3)
RBC # BLD: 4.72 M/UL — SIGNIFICANT CHANGE UP (ref 4.2–5.8)
RBC # FLD: 12.9 % — SIGNIFICANT CHANGE UP (ref 10.3–14.5)
SODIUM SERPL-SCNC: 126 MMOL/L — LOW (ref 135–145)
SODIUM SERPL-SCNC: 134 MMOL/L — LOW (ref 135–145)
SODIUM UR-SCNC: 42 MMOL/L — SIGNIFICANT CHANGE UP
WBC # BLD: 3.75 K/UL — LOW (ref 3.8–10.5)
WBC # FLD AUTO: 3.75 K/UL — LOW (ref 3.8–10.5)

## 2022-11-10 RX ORDER — DESMOPRESSIN ACETATE 0.1 MG/1
0.1 TABLET ORAL AT BEDTIME
Refills: 0 | Status: COMPLETED | OUTPATIENT
Start: 2022-11-10 | End: 2022-11-10

## 2022-11-10 RX ADMIN — DESMOPRESSIN ACETATE 0.1 MILLIGRAM(S): 0.1 TABLET ORAL at 20:04

## 2022-11-10 RX ADMIN — Medication 40 MILLIEQUIVALENT(S): at 01:28

## 2022-11-10 RX ADMIN — PIPERACILLIN AND TAZOBACTAM 25 GRAM(S): 4; .5 INJECTION, POWDER, LYOPHILIZED, FOR SOLUTION INTRAVENOUS at 20:04

## 2022-11-10 RX ADMIN — TAMSULOSIN HYDROCHLORIDE 0.4 MILLIGRAM(S): 0.4 CAPSULE ORAL at 21:29

## 2022-11-10 RX ADMIN — PIPERACILLIN AND TAZOBACTAM 25 GRAM(S): 4; .5 INJECTION, POWDER, LYOPHILIZED, FOR SOLUTION INTRAVENOUS at 12:38

## 2022-11-10 RX ADMIN — PIPERACILLIN AND TAZOBACTAM 25 GRAM(S): 4; .5 INJECTION, POWDER, LYOPHILIZED, FOR SOLUTION INTRAVENOUS at 01:38

## 2022-11-10 NOTE — PROGRESS NOTE ADULT - ASSESSMENT
1. Diverticulitis, uncomplicatted  - CT A/P shows mild sigmoid diverticulitis, no abscess or microperforation.   - cw zosyn  - low fiber diet   - Colonoscopy in 4-6 weeks     2. Hyponatremia,   - Na 126 today  - per nephro , on fluid restriction    3. Diabetes insipidus, holding DDAVP    4. Leukopenia, mild in the setting of infection  -trend CBC    5. Hepatic hemangiomas, likely benign  -outpatient GI follow up      Attending supervision statement: I have personally seen and examined the patient. I fully participated in the care of this patient. I have made amendments to the documentation where necessary, and agree with the history, physical exam, and plan as outlined by the ACP.    [address]  Office: [phone]

## 2022-11-10 NOTE — CHART NOTE - NSCHARTNOTEFT_GEN_A_CORE
Received the BMP results () with Dr. Todd, Renal. Recs to give DDAVP now vs at bedtime. RN made aware, Will continue to monitor.      Cayden Anand [place]  [id number]

## 2022-11-10 NOTE — PROGRESS NOTE ADULT - ASSESSMENT
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 Nephrology consulted for hyponatremia.    1) Hyponatremia: Due to excessive fluid intake in setting of high ADH state on DDAVP. Serum Na improving s/p DDAVP 5 mcg spray on 11/9. Repeat BMP this evening as expect DDAVP effect to be wearing off given complaints of polyuria. Monitor serum Na.    2) Central DI: On DDAVP 10 mcg daily. Patient unable to accurately take 1/2 spray dose (for 5 mcg) so will change to oral preparation (0.1 mg X 1 dose this evening at bedtime). Monitor serum Na.    3) HTN: BP controlled. Monitor off medications.    4) Diverticulitis: On Zosyn in NS base. As per GI.      [facility] NEPHROLOGY  Marbin Soares M.D.  Tylor Todd D.O.  Jennifer Vizcaino M.D.  Jesi Chakraborty, MSN, ANP-C    Telephone: [phone]  Facsimile: [phone]    [address] [place] [id number]

## 2022-11-11 LAB
ANION GAP SERPL CALC-SCNC: 11 MMOL/L — SIGNIFICANT CHANGE UP (ref 5–17)
ANION GAP SERPL CALC-SCNC: 11 MMOL/L — SIGNIFICANT CHANGE UP (ref 5–17)
BUN SERPL-MCNC: 17 MG/DL — SIGNIFICANT CHANGE UP (ref 7–23)
BUN SERPL-MCNC: 21 MG/DL — SIGNIFICANT CHANGE UP (ref 7–23)
CALCIUM SERPL-MCNC: 9.6 MG/DL — SIGNIFICANT CHANGE UP (ref 8.4–10.5)
CALCIUM SERPL-MCNC: 9.7 MG/DL — SIGNIFICANT CHANGE UP (ref 8.4–10.5)
CHLORIDE SERPL-SCNC: 101 MMOL/L — SIGNIFICANT CHANGE UP (ref 96–108)
CHLORIDE SERPL-SCNC: 103 MMOL/L — SIGNIFICANT CHANGE UP (ref 96–108)
CO2 SERPL-SCNC: 23 MMOL/L — SIGNIFICANT CHANGE UP (ref 22–31)
CO2 SERPL-SCNC: 24 MMOL/L — SIGNIFICANT CHANGE UP (ref 22–31)
CREAT SERPL-MCNC: 1.28 MG/DL — SIGNIFICANT CHANGE UP (ref 0.5–1.3)
CREAT SERPL-MCNC: 1.47 MG/DL — HIGH (ref 0.5–1.3)
EGFR: 52 ML/MIN/1.73M2 — LOW
EGFR: 62 ML/MIN/1.73M2 — SIGNIFICANT CHANGE UP
GLUCOSE SERPL-MCNC: 85 MG/DL — SIGNIFICANT CHANGE UP (ref 70–99)
GLUCOSE SERPL-MCNC: 94 MG/DL — SIGNIFICANT CHANGE UP (ref 70–99)
OSMOLALITY UR: 260 MOS/KG — LOW (ref 300–900)
POTASSIUM SERPL-MCNC: 4 MMOL/L — SIGNIFICANT CHANGE UP (ref 3.5–5.3)
POTASSIUM SERPL-MCNC: 4.4 MMOL/L — SIGNIFICANT CHANGE UP (ref 3.5–5.3)
POTASSIUM SERPL-SCNC: 4 MMOL/L — SIGNIFICANT CHANGE UP (ref 3.5–5.3)
POTASSIUM SERPL-SCNC: 4.4 MMOL/L — SIGNIFICANT CHANGE UP (ref 3.5–5.3)
SODIUM SERPL-SCNC: 135 MMOL/L — SIGNIFICANT CHANGE UP (ref 135–145)
SODIUM SERPL-SCNC: 138 MMOL/L — SIGNIFICANT CHANGE UP (ref 135–145)
SODIUM UR-SCNC: 12 MMOL/L — SIGNIFICANT CHANGE UP

## 2022-11-11 RX ORDER — DESMOPRESSIN ACETATE 0.1 MG/1
0.2 TABLET ORAL ONCE
Refills: 0 | Status: DISCONTINUED | OUTPATIENT
Start: 2022-11-11 | End: 2022-11-11

## 2022-11-11 RX ORDER — DESMOPRESSIN ACETATE 0.1 MG/1
0.2 TABLET ORAL ONCE
Refills: 0 | Status: COMPLETED | OUTPATIENT
Start: 2022-11-11 | End: 2022-11-11

## 2022-11-11 RX ADMIN — PIPERACILLIN AND TAZOBACTAM 25 GRAM(S): 4; .5 INJECTION, POWDER, LYOPHILIZED, FOR SOLUTION INTRAVENOUS at 04:18

## 2022-11-11 RX ADMIN — TAMSULOSIN HYDROCHLORIDE 0.4 MILLIGRAM(S): 0.4 CAPSULE ORAL at 21:10

## 2022-11-11 RX ADMIN — PIPERACILLIN AND TAZOBACTAM 25 GRAM(S): 4; .5 INJECTION, POWDER, LYOPHILIZED, FOR SOLUTION INTRAVENOUS at 12:39

## 2022-11-11 RX ADMIN — PIPERACILLIN AND TAZOBACTAM 25 GRAM(S): 4; .5 INJECTION, POWDER, LYOPHILIZED, FOR SOLUTION INTRAVENOUS at 21:14

## 2022-11-11 RX ADMIN — DESMOPRESSIN ACETATE 0.2 MILLIGRAM(S): 0.1 TABLET ORAL at 18:53

## 2022-11-11 NOTE — PROGRESS NOTE ADULT - ASSESSMENT
66 year old man with a PMH of pituitary adenoma, DI and diverticulitis, sent to ED by  mfor diverticulitis and hyponatremia.   Last week he was ill with the flu > recovered but then on the evening of 11/06 he developed LLQ pain, nausea and diarrhea, similar to prior episode of diverticulitis in sept /2022. He was started on PO Augmentin by his PCP but subsequent labs showed a Na of 119 prompting visit to the ED.   Overall feels better    No fever / chills, vomiting   Labs notable for Na 122.      hyponatremia : restart at lower dose desmopressin   hold citaoplram for now   na now 134 ..  will be recieving desmopressin : monitor : d/w Dr. Peyton colbert restriction for now to 800 cc     Diverticulitis : iv abx for now

## 2022-11-11 NOTE — PROGRESS NOTE ADULT - ASSESSMENT
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 Nephrology consulted for hyponatremia.    1) Hyponatremia: Due to excessive fluid intake in setting of high ADH state on DDAVP. Serum Na improving. Monitor serum Na.    2) Central DI: On DDAVP 10 mcg daily intranasally as an outpatient however changed to oral formulation given concentrated urine. Increase DDAVP to 0.2 mg PO X 1 dose this evening and repeat urine osm in AM. Monitor serum Na.    3) HTN: BP controlled. Monitor off medications.    4) Diverticulitis: On Zosyn in NS base. As per GI.      [facility] NEPHROLOGY  Marbin Soares M.D.  Tylor Todd D.O.  Jennifer Vizcaino M.D.  Jesi Chakraborty, MSN, ANP-C    Telephone: [phone]  Facsimile: [phone]    [address] [place] [address]

## 2022-11-11 NOTE — PROGRESS NOTE ADULT - ASSESSMENT
1. Diverticulitis, uncomplicatted  - CT A/P shows mild sigmoid diverticulitis, no abscess or microperforation.   - cw zosyn. if discharged today can complete an additional 7 days of augmentin.  - low fiber diet   - Colonoscopy in 4-6 weeks     2. Hyponatremia,   - Na 135 today  - per nephro    3. Diabetes insipidus  -per nephro    4. Leukopenia, mild in the setting of infection  -trend CBC    5. Hepatic hemangiomas, likely benign  -outpatient GI follow up      Attending supervision statement: I have personally seen and examined the patient. I fully participated in the care of this patient. I have made amendments to the documentation where necessary, and agree with the history, physical exam, and plan as outlined by the ACP.    [address]  Office: [phone]

## 2022-11-12 ENCOUNTER — TRANSCRIPTION ENCOUNTER (OUTPATIENT)
Age: 67
End: 2022-11-12

## 2022-11-12 VITALS
DIASTOLIC BLOOD PRESSURE: 67 MMHG | TEMPERATURE: 98 F | HEART RATE: 56 BPM | RESPIRATION RATE: 18 BRPM | SYSTOLIC BLOOD PRESSURE: 106 MMHG | OXYGEN SATURATION: 100 %

## 2022-11-12 LAB
ANION GAP SERPL CALC-SCNC: 13 MMOL/L — SIGNIFICANT CHANGE UP (ref 5–17)
BUN SERPL-MCNC: 27 MG/DL — HIGH (ref 7–23)
CALCIUM SERPL-MCNC: 9.6 MG/DL — SIGNIFICANT CHANGE UP (ref 8.4–10.5)
CHLORIDE SERPL-SCNC: 103 MMOL/L — SIGNIFICANT CHANGE UP (ref 96–108)
CO2 SERPL-SCNC: 23 MMOL/L — SIGNIFICANT CHANGE UP (ref 22–31)
CREAT SERPL-MCNC: 1.42 MG/DL — HIGH (ref 0.5–1.3)
EGFR: 54 ML/MIN/1.73M2 — LOW
GLUCOSE SERPL-MCNC: 83 MG/DL — SIGNIFICANT CHANGE UP (ref 70–99)
HCT VFR BLD CALC: 39.5 % — SIGNIFICANT CHANGE UP (ref 39–50)
HGB BLD-MCNC: 13.6 G/DL — SIGNIFICANT CHANGE UP (ref 13–17)
MAGNESIUM SERPL-MCNC: 2.3 MG/DL — SIGNIFICANT CHANGE UP (ref 1.6–2.6)
MCHC RBC-ENTMCNC: 28.8 PG — SIGNIFICANT CHANGE UP (ref 27–34)
MCHC RBC-ENTMCNC: 34.4 GM/DL — SIGNIFICANT CHANGE UP (ref 32–36)
MCV RBC AUTO: 83.5 FL — SIGNIFICANT CHANGE UP (ref 80–100)
NRBC # BLD: 0 /100 WBCS — SIGNIFICANT CHANGE UP (ref 0–0)
OSMOLALITY UR: 857 MOS/KG — SIGNIFICANT CHANGE UP (ref 300–900)
PHOSPHATE SERPL-MCNC: 3.9 MG/DL — SIGNIFICANT CHANGE UP (ref 2.5–4.5)
PLATELET # BLD AUTO: 208 K/UL — SIGNIFICANT CHANGE UP (ref 150–400)
POTASSIUM SERPL-MCNC: 4.3 MMOL/L — SIGNIFICANT CHANGE UP (ref 3.5–5.3)
POTASSIUM SERPL-SCNC: 4.3 MMOL/L — SIGNIFICANT CHANGE UP (ref 3.5–5.3)
RBC # BLD: 4.73 M/UL — SIGNIFICANT CHANGE UP (ref 4.2–5.8)
RBC # FLD: 13.7 % — SIGNIFICANT CHANGE UP (ref 10.3–14.5)
SODIUM SERPL-SCNC: 139 MMOL/L — SIGNIFICANT CHANGE UP (ref 135–145)
WBC # BLD: 5.99 K/UL — SIGNIFICANT CHANGE UP (ref 3.8–10.5)
WBC # FLD AUTO: 5.99 K/UL — SIGNIFICANT CHANGE UP (ref 3.8–10.5)

## 2022-11-12 PROCEDURE — 82947 ASSAY GLUCOSE BLOOD QUANT: CPT

## 2022-11-12 PROCEDURE — 84100 ASSAY OF PHOSPHORUS: CPT

## 2022-11-12 PROCEDURE — 84540 ASSAY OF URINE/UREA-N: CPT

## 2022-11-12 PROCEDURE — 82803 BLOOD GASES ANY COMBINATION: CPT

## 2022-11-12 PROCEDURE — U0003: CPT

## 2022-11-12 PROCEDURE — 84133 ASSAY OF URINE POTASSIUM: CPT

## 2022-11-12 PROCEDURE — 85018 HEMOGLOBIN: CPT

## 2022-11-12 PROCEDURE — 84295 ASSAY OF SERUM SODIUM: CPT

## 2022-11-12 PROCEDURE — 80053 COMPREHEN METABOLIC PANEL: CPT

## 2022-11-12 PROCEDURE — 85025 COMPLETE CBC W/AUTO DIFF WBC: CPT

## 2022-11-12 PROCEDURE — U0005: CPT

## 2022-11-12 PROCEDURE — 81001 URINALYSIS AUTO W/SCOPE: CPT

## 2022-11-12 PROCEDURE — 85610 PROTHROMBIN TIME: CPT

## 2022-11-12 PROCEDURE — 84132 ASSAY OF SERUM POTASSIUM: CPT

## 2022-11-12 PROCEDURE — 96374 THER/PROPH/DIAG INJ IV PUSH: CPT

## 2022-11-12 PROCEDURE — 80048 BASIC METABOLIC PNL TOTAL CA: CPT

## 2022-11-12 PROCEDURE — 83735 ASSAY OF MAGNESIUM: CPT

## 2022-11-12 PROCEDURE — 84300 ASSAY OF URINE SODIUM: CPT

## 2022-11-12 PROCEDURE — 99285 EMERGENCY DEPT VISIT HI MDM: CPT

## 2022-11-12 PROCEDURE — 83605 ASSAY OF LACTIC ACID: CPT

## 2022-11-12 PROCEDURE — 84156 ASSAY OF PROTEIN URINE: CPT

## 2022-11-12 PROCEDURE — 85014 HEMATOCRIT: CPT

## 2022-11-12 PROCEDURE — 85027 COMPLETE CBC AUTOMATED: CPT

## 2022-11-12 PROCEDURE — 82570 ASSAY OF URINE CREATININE: CPT

## 2022-11-12 PROCEDURE — 82330 ASSAY OF CALCIUM: CPT

## 2022-11-12 PROCEDURE — 83935 ASSAY OF URINE OSMOLALITY: CPT

## 2022-11-12 PROCEDURE — 83930 ASSAY OF BLOOD OSMOLALITY: CPT

## 2022-11-12 PROCEDURE — 82435 ASSAY OF BLOOD CHLORIDE: CPT

## 2022-11-12 PROCEDURE — 85730 THROMBOPLASTIN TIME PARTIAL: CPT

## 2022-11-12 PROCEDURE — 76705 ECHO EXAM OF ABDOMEN: CPT

## 2022-11-12 PROCEDURE — 74177 CT ABD & PELVIS W/CONTRAST: CPT | Mod: MA

## 2022-11-12 PROCEDURE — 36415 COLL VENOUS BLD VENIPUNCTURE: CPT

## 2022-11-12 RX ORDER — DESMOPRESSIN ACETATE 0.1 MG/1
0 TABLET ORAL
Qty: 0 | Refills: 0 | DISCHARGE

## 2022-11-12 RX ORDER — DESMOPRESSIN ACETATE 0.1 MG/1
1 TABLET ORAL
Qty: 30 | Refills: 0
Start: 2022-11-12 | End: 2022-12-11

## 2022-11-12 RX ORDER — DESMOPRESSIN ACETATE 0.1 MG/1
0.2 TABLET ORAL ONCE
Refills: 0 | Status: COMPLETED | OUTPATIENT
Start: 2022-11-12 | End: 2022-11-12

## 2022-11-12 RX ADMIN — DESMOPRESSIN ACETATE 0.2 MILLIGRAM(S): 0.1 TABLET ORAL at 17:31

## 2022-11-12 RX ADMIN — PIPERACILLIN AND TAZOBACTAM 25 GRAM(S): 4; .5 INJECTION, POWDER, LYOPHILIZED, FOR SOLUTION INTRAVENOUS at 12:29

## 2022-11-12 RX ADMIN — PIPERACILLIN AND TAZOBACTAM 25 GRAM(S): 4; .5 INJECTION, POWDER, LYOPHILIZED, FOR SOLUTION INTRAVENOUS at 04:05

## 2022-11-12 NOTE — DISCHARGE NOTE PROVIDER - HOSPITAL COURSE
Patient was admitted for abdominal pain which was treated with adequate pain regimen., He subsequently underwent CT A/P showed mild sigmoid diverticulitis, no abscess or microperforation. As a result GI was consulted and he was started on Zosyn which was later deescalated to Augmentin. Course was complicated by Hyponatremia so he was stared on PO DDAVP per nephrology recommendations and sodium was closely monitored. Sodium improved and he was deemed medically optimized for discharge with close outpatient follow up with GI and Nephrology.

## 2022-11-12 NOTE — PROGRESS NOTE ADULT - ASSESSMENT
1. Diverticulitis, uncomplicatted  - CT A/P shows mild sigmoid diverticulitis, no abscess or microperforation.   - cw zosyn. if discharged today can complete an additional 6 days of augmentin.  - low fiber diet   - Colonoscopy in 4-6 weeks     2. Hyponatremia,   - Na 138 today  - per nephro    3. Diabetes insipidus  -per nephro    4. Leukopenia, mild in the setting of infection  -trend CBC    5. Hepatic hemangiomas, likely benign  -outpatient GI follow up      Attending supervision statement: I have personally seen and examined the patient. I fully participated in the care of this patient. I have made amendments to the documentation where necessary, and agree with the history, physical exam, and plan as outlined by the ACP.    [address]  Office: [phone]

## 2022-11-12 NOTE — PROGRESS NOTE ADULT - PROVIDER SPECIALTY LIST ADULT
Internal Medicine
Internal Medicine
Nephrology
Nephrology
Gastroenterology
Internal Medicine
Internal Medicine
Nephrology

## 2022-11-12 NOTE — DISCHARGE NOTE NURSING/CASE MANAGEMENT/SOCIAL WORK - NSDCFUADDAPPT_GEN_ALL_CORE_FT
Follow up with you gastroenterologist within 1 month     [address]  Office: [phone]    Also Follow up with the Nephrologist within 1 week:

## 2022-11-12 NOTE — DISCHARGE NOTE NURSING/CASE MANAGEMENT/SOCIAL WORK - PATIENT PORTAL LINK FT
You can access the FollowMyHealth Patient Portal offered by [facility] by registering at the following website: [email or web address] By joining [email or web address]’s FollowMyHealth portal, you will also be able to view your health information using other applications (apps) compatible with our system.

## 2022-11-12 NOTE — PROGRESS NOTE ADULT - ASSESSMENT
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 Nephrology consulted for hyponatremia.    1) Hyponatremia: Due to excessive fluid intake in setting of high ADH state on DDAVP. Serum Na improving. Monitor serum Na.    2) Central DI: On DDAVP 10 mcg daily intranasally as an outpatient however changed to oral formulation given concentrated urine. Recc DDAVP 0.2 mg PO daily on discharge. Monitor serum Na.    3) HTN: BP controlled. Monitor off medications.    4) Diverticulitis: On Zosyn in [place]. As per GI.    Cleared from renal standpoint for d/c home on DDAVP  0.2mg PO daily      [facility] NEPHROLOGY  Marbin Soares M.D.  Tylor Todd D.O.  Jennifer Vizcaino M.D.  Jesi Chakraborty, MSN, ANP-C    Telephone: [phone]  Facsimile: [phone]    [address] [place] [address]

## 2022-11-12 NOTE — DISCHARGE NOTE PROVIDER - NSDCMRMEDTOKEN_GEN_ALL_CORE_FT
amoxicillin-clavulanate 875 mg-125 mg oral tablet: 1 tab(s) orally 2 times a day   citalopram 20 mg oral tablet: 1 tab(s) orally once a day  desmopressin 0.2 mg oral tablet: 1 tab(s) orally once a day   omeprazole 20 mg oral delayed release capsule: 1 cap(s) orally once a day  tamsulosin 0.4 mg oral capsule: 1 cap(s) orally once a day (at bedtime)

## 2022-11-12 NOTE — DISCHARGE NOTE NURSING/CASE MANAGEMENT/SOCIAL WORK - NSDCPEFALRISK_GEN_ALL_CORE
For information on Fall & Injury Prevention, visit: [email or web address] OR  [email or web address] OR  https://www.cdc.gov/steadi/patient.html

## 2022-11-12 NOTE — DISCHARGE NOTE PROVIDER - NSDCFUADDAPPT_GEN_ALL_CORE_FT
Follow up with you gastroenterologist within 1 month     [address]  Office: [phone]    Also Follow up with the Nephrologist within 1 week:

## 2022-11-12 NOTE — DISCHARGE NOTE PROVIDER - NSDCCPTREATMENT_GEN_ALL_CORE_FT
PRINCIPAL PROCEDURE  Procedure: CT abdomen pel wo con  Findings and Treatment: PROCEDURE:  CT of the Abdomen and Pelvis was performed.  Sagittal and coronal reformats were performed.  FINDINGS:  LOWER CHEST: Within normal limits.  LIVER: Few hepatic hemangiomas previously characterized on MR 10/13/2022.  BILE DUCTS: Normal caliber.  GALLBLADDER: Within normal limits.  SPLEEN: Within normal limits.  PANCREAS: Within normal limits.  ADRENALS: Within normal limits.  KIDNEYS/URETERS: Bilateral subcentimeter hypodensities too small to   characterize. No hydronephrosis.  BLADDER: Within normal limits.  REPRODUCTIVE ORGANS: Prostate is enlarged.  BOWEL: No bowel obstruction. Appendix is normal. Extensive sigmoid   diverticulosis with mild stranding in the perisigmoid fat.  PERITONEUM: Trace pelvic fluid. No free air or drainable fluid collection.  VESSELS: Mild atherosclerotic changes.  RETROPERITONEUM/LYMPH NODES: No lymphadenopathy.  ABDOMINAL WALL: Left anterior abdominal wall hernia mesh repair.  BONES: Within normal limits.  IMPRESSION:  Extensive sigmoid diverticulosis with mild stranding in the perisigmoid   fat and trace pelvic fluid, may represent a mild acute diverticulitis.

## 2022-11-12 NOTE — PROGRESS NOTE ADULT - SUBJECTIVE AND OBJECTIVE BOX
Interval Events:   Patient seen and examined.   Abdominal pain improved.   No BM today. No n/v.   Tolerating diet.     Hospital Medications:  influenza  Vaccine (HIGH DOSE) 0.7 milliLiter(s) IntraMuscular once  piperacillin/tazobactam IVPB.. 3.375 Gram(s) IV Intermittent every 8 hours  tamsulosin 0.4 milliGRAM(s) Oral at bedtime        Review of Systems:  General:  No wt loss, fevers, chills, night sweats, fatigue,   Eyes:  Good vision, no reported pain  ENT:  No sore throat, pain, runny nose, dysphagia  CV:  No pain, palpitations, hypo/hypertension  Resp:  No dyspnea, cough, tachypnea, wheezing  GI:  See HPI  :  No pain, bleeding, incontinence, nocturia  Muscle:  No pain, weakness  Neuro:  No weakness, tingling, memory problems  Psych:  No fatigue, insomnia, mood problems, depression  Endocrine:  No polyuria, polydipsia, cold/heat intolerance  Heme:  No petechiae, ecchymosis, easy bruisability  Integumentary:  No rash, edema    PHYSICAL EXAM:   Vital Signs:  Vital Signs Last 24 Hrs  T(C): 36.7 (10 Nov 2022 11:55), Max: 36.9 (2022 20:30)  T(F): 98.1 (10 Nov 2022 11:55), Max: 98.4 (2022 20:30)  HR: 56 (10 Nov 2022 11:55) (53 - 56)  BP: 96/60 (10 Nov 2022 11:55) (96/60 - 132/72)  BP(mean): --  RR: 18 (10 Nov 2022 11:55) (18 - 18)  SpO2: 97% (10 Nov 2022 11:55) (97% - 99%)    Parameters below as of 10 Nov 2022 11:55  Patient On (Oxygen Delivery Method): room air      Daily     Daily       PHYSICAL EXAM:     GENERAL:  Appears stated age, well-groomed, well-nourished, no distress  HEENT:  NC/AT,  conjunctivae anicteric, clear and pink,   NECK: supple, trachea midline  CHEST:  Full & symmetric excursion, no increased effort, breath sounds clear  HEART:  Regular rhythm, no JVD  ABDOMEN:  Soft, +tender, non-distended, normoactive bowel sounds,  no masses , no hepatosplenomegaly  EXTREMITIES:  no cyanosis,clubbing or edema  SKIN:  No rash, erythema, or, ecchymoses, no jaundice  NEURO:  Alert, non-focal, no asterixis  PSYCH: Appropriate affect, oriented to place and time  RECTAL: Deferred      LABS Personally reviewed by me:                        13.8   3.75  )-----------( 178      ( 10 Nov 2022 07:11 )             38.6     Mean Cell Volume: 81.8 fl (11-10- @ 07:11)    11-10    126<L>  |  94<L>  |  18  ----------------------------<  83  4.2   |  22  |  1.25    Ca    8.8      10 Nov 2022 07:14  Phos  2.7     11-10  Mg     2.0     10          Urinalysis Basic - ( 2022 14:05 )    Color: Yellow / Appearance: Clear / S.031 / pH: x  Gluc: x / Ketone: Negative  / Bili: Negative / Urobili: Negative   Blood: x / Protein: 30 mg/dL / Nitrite: Negative   Leuk Esterase: Negative / RBC: 3 /hpf / WBC 3 /HPF   Sq Epi: x / Non Sq Epi: 1 /hpf / Bacteria: Negative                              13.8   3.75  )-----------( 178      ( 10 Nov 2022 07:11 )             38.6                         13.5   3.77  )-----------( 152      ( 2022 11:32 )             38.3       Imaging personally reviewed by me:          
Date of service: 11-10-22 @ 20:44      Patient is a 66y old  Male who presents with a chief complaint of diverticulitis, hyponatremia (08 Nov 2022 14:42)                                                               INTERVAL HPI/OVERNIGHT EVENTS:    REVIEW OF SYSTEMS:     CONSTITUTIONAL: No weakness, fevers or chills  RESPIRATORY: No cough, wheezing,  No shortness of breath  CARDIOVASCULAR: No chest pain or palpitations  GASTROINTESTINAL: No abdominal pain  . No nausea, vomiting, or hematemesis; No diarrhea or constipation. No melena or hematochezia.  GENITOURINARY: No dysuria, frequency or hematuria  NEUROLOGICAL: No numbness or weakness                                                                                                                                                                                                                                                                         Medications:  MEDICATIONS  (STANDING):  influenza  Vaccine (HIGH DOSE) 0.7 milliLiter(s) IntraMuscular once  piperacillin/tazobactam IVPB.. 3.375 Gram(s) IV Intermittent every 8 hours  tamsulosin 0.4 milliGRAM(s) Oral at bedtime    MEDICATIONS  (PRN):       Allergies    No Known Allergies    Intolerances      Vital Signs Last 24 Hrs  T(C): 36.7 (10 Nov 2022 11:55), Max: 36.7 (10 Nov 2022 04:10)  T(F): 98.1 (10 Nov 2022 11:55), Max: 98.1 (10 Nov 2022 11:55)  HR: 56 (10 Nov 2022 11:55) (53 - 56)  BP: 96/60 (10 Nov 2022 11:55) (96/60 - 111/67)  BP(mean): --  RR: 18 (10 Nov 2022 11:55) (18 - 18)  SpO2: 97% (10 Nov 2022 11:55) (97% - 99%)    Parameters below as of 10 Nov 2022 11:55  Patient On (Oxygen Delivery Method): room air      CAPILLARY BLOOD GLUCOSE          11-10 @ 07:01  -  11-10 @ 20:44  --------------------------------------------------------  IN: 0 mL / OUT: 1150 mL / NET: -1150 mL      Physical Exam:    Daily     Daily   General:  Well appearing, NAD, not cachetic  HEENT:  Nonicteric, PERRLA  CV:  RRR, S1S2   Lungs:  CTA B/L, no wheezes, rales, rhonchi  Abdomen:  Soft, non-tender, no distended, positive BS  Extremities:  2+ pulses, no c/c, no edema  Skin:  Warm and dry, no rashes  :  No wall  Neuro:  AAOx3, non-focal, grossly intact                                                                                                                                                                                                                                                                                                LABS:                               13.8   3.75  )-----------( 178      ( 10 Nov 2022 07:11 )             38.6                      11-10    134<L>  |  99  |  19  ----------------------------<  102<H>  4.2   |  24  |  1.37<H>    Ca    9.4      10 Nov 2022 18:49  Phos  2.7     11-10  Mg     2.0     11-10                         RADIOLOGY & ADDITIONAL TESTS         I personally reviewed: [  ]EKG   [  ]CXR    [  ] CT      A/P:         Discussed with :     Priscila consultants' Notes   Time spent :  
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 Nephrology consulted for hyponatremia.    REVIEW OF SYSTEMS:  Gen: no fevers  Cards: no chest pain  Resp: no dyspnea  GI: no nausea or vomiting or diarrhea, + ab pain improving  Vascular: no LE edema    No Known Allergies      Hospital Medications: Medications reviewed      VITALS:  T(F): 98.1 (11-10-22 @ 11:55), Max: 98.4 (22 @ 20:30)  HR: 56 (11-10-22 @ 11:55)  BP: 96/60 (11-10-22 @ 11:55)  RR: 18 (11-10-22 @ 11:55)  SpO2: 97% (11-10-22 @ 11:55)  Wt(kg): --    11-10 @ 07:01  -  11-10 @ 15:41  --------------------------------------------------------  IN: 0 mL / OUT: 300 mL / NET: -300 mL      PHYSICAL EXAM:    Gen: NAD, calm  Cards: RRR, +S1/S2, no M/G/R  Resp: CTA B/L  GI: soft, NT/ND, NABS  Vascular: no LE edema B/L        LABS:  11-10    126<L>  |  94<L>  |  18  ----------------------------<  83  4.2   |  22  |  1.25    Ca    8.8      10 Nov 2022 07:14  Phos  2.7     11-10  Mg     2.0     11-10      Creatinine Trend: 1.25 <--, 1.45 <--, 1.24 <--, 1.05 <--, 0.98 <--                        13.8   3.75  )-----------( 178      ( 10 Nov 2022 07:11 )             38.6     Urine Studies:  Urinalysis Basic - ( 2022 14:05 )    Color: Yellow / Appearance: Clear / S.031 / pH:   Gluc:  / Ketone: Negative  / Bili: Negative / Urobili: Negative   Blood:  / Protein: 30 mg/dL / Nitrite: Negative   Leuk Esterase: Negative / RBC: 3 /hpf / WBC 3 /HPF   Sq Epi:  / Non Sq Epi: 1 /hpf / Bacteria: Negative      Sodium, Random Urine: 42 mmol/L (11-10 @ 07:25)  Osmolality, Random Urine: 864 mos/kg (11-10 @ 07:25)  Osmolality, Random Urine: 860 mos/kg ( @ 14:05)  Sodium, Random Urine: 74 mmol/L ( @ 14:05)  Creatinine, Random Urine: 211 mg/dL ( @ 14:05)  Protein/Creatinine Ratio Calculation: 0.1 Ratio ( @ 14:05)  Potassium, Random Urine: 90 mmol/L ( @ 14:05)    
pt stable for dc   fu w endo as op   dc on 0.2 mg oral DDAVP   d/w resident    see dc summary
  Chief Complaint:  Patient is a 66y old  Male who presents with a chief complaint of diverticulitis, hyponatremia (2022 14:42)      Date of service 11-10-22 @ 12:36      Interval Events:   Patient seen and examined.   Abdominal pain improved.   No BM today. No n/v.   Tolerating diet.     Hospital Medications:  influenza  Vaccine (HIGH DOSE) 0.7 milliLiter(s) IntraMuscular once  piperacillin/tazobactam IVPB.. 3.375 Gram(s) IV Intermittent every 8 hours  tamsulosin 0.4 milliGRAM(s) Oral at bedtime        Review of Systems:  General:  No wt loss, fevers, chills, night sweats, fatigue,   Eyes:  Good vision, no reported pain  ENT:  No sore throat, pain, runny nose, dysphagia  CV:  No pain, palpitations, hypo/hypertension  Resp:  No dyspnea, cough, tachypnea, wheezing  GI:  See HPI  :  No pain, bleeding, incontinence, nocturia  Muscle:  No pain, weakness  Neuro:  No weakness, tingling, memory problems  Psych:  No fatigue, insomnia, mood problems, depression  Endocrine:  No polyuria, polydipsia, cold/heat intolerance  Heme:  No petechiae, ecchymosis, easy bruisability  Integumentary:  No rash, edema    PHYSICAL EXAM:   Vital Signs:  Vital Signs Last 24 Hrs  T(C): 36.7 (10 Nov 2022 11:55), Max: 36.9 (2022 20:30)  T(F): 98.1 (10 Nov 2022 11:55), Max: 98.4 (2022 20:30)  HR: 56 (10 Nov 2022 11:55) (53 - 56)  BP: 96/60 (10 Nov 2022 11:55) (96/60 - 132/72)  BP(mean): --  RR: 18 (10 Nov 2022 11:55) (18 - 18)  SpO2: 97% (10 Nov 2022 11:55) (97% - 99%)    Parameters below as of 10 Nov 2022 11:55  Patient On (Oxygen Delivery Method): room air      Daily     Daily       PHYSICAL EXAM:     GENERAL:  Appears stated age, well-groomed, well-nourished, no distress  HEENT:  NC/AT,  conjunctivae anicteric, clear and pink,   NECK: supple, trachea midline  CHEST:  Full & symmetric excursion, no increased effort, breath sounds clear  HEART:  Regular rhythm, no JVD  ABDOMEN:  Soft, +tender, non-distended, normoactive bowel sounds,  no masses , no hepatosplenomegaly  EXTREMITIES:  no cyanosis,clubbing or edema  SKIN:  No rash, erythema, or, ecchymoses, no jaundice  NEURO:  Alert, non-focal, no asterixis  PSYCH: Appropriate affect, oriented to place and time  RECTAL: Deferred      LABS Personally reviewed by me:                        13.8   3.75  )-----------( 178      ( 10 Nov 2022 07:11 )             38.6     Mean Cell Volume: 81.8 fl (11-10- @ 07:11)    11-10    126<L>  |  94<L>  |  18  ----------------------------<  83  4.2   |  22  |  1.25    Ca    8.8      10 Nov 2022 07:14  Phos  2.7     10  Mg     2.0     11-10          Urinalysis Basic - ( 2022 14:05 )    Color: Yellow / Appearance: Clear / S.031 / pH: x  Gluc: x / Ketone: Negative  / Bili: Negative / Urobili: Negative   Blood: x / Protein: 30 mg/dL / Nitrite: Negative   Leuk Esterase: Negative / RBC: 3 /hpf / WBC 3 /HPF   Sq Epi: x / Non Sq Epi: 1 /hpf / Bacteria: Negative                              13.8   3.75  )-----------( 178      ( 10 Nov 2022 07:11 )             38.6                         13.5   3.77  )-----------( 152      ( 2022 11:32 )             38.3       Imaging personally reviewed by me:          
  Chief Complaint:  Patient is a 66y old  Male who presents with a chief complaint of diverticulitis, hyponatremia (2022 14:42)      Date of service 11-10-22 @ 12:36      Interval Events:   Patient seen and examined.   Abdominal pain improving.   No BM today. No n/v.   Tolerating diet.     Hospital Medications:  influenza  Vaccine (HIGH DOSE) 0.7 milliLiter(s) IntraMuscular once  piperacillin/tazobactam IVPB.. 3.375 Gram(s) IV Intermittent every 8 hours  tamsulosin 0.4 milliGRAM(s) Oral at bedtime        Review of Systems:  General:  No wt loss, fevers, chills, night sweats, fatigue,   Eyes:  Good vision, no reported pain  ENT:  No sore throat, pain, runny nose, dysphagia  CV:  No pain, palpitations, hypo/hypertension  Resp:  No dyspnea, cough, tachypnea, wheezing  GI:  See HPI  :  No pain, bleeding, incontinence, nocturia  Muscle:  No pain, weakness  Neuro:  No weakness, tingling, memory problems  Psych:  No fatigue, insomnia, mood problems, depression  Endocrine:  No polyuria, polydipsia, cold/heat intolerance  Heme:  No petechiae, ecchymosis, easy bruisability  Integumentary:  No rash, edema    PHYSICAL EXAM:   Vital Signs:  Vital Signs Last 24 Hrs  T(C): 36.7 (10 Nov 2022 11:55), Max: 36.9 (2022 20:30)  T(F): 98.1 (10 Nov 2022 11:55), Max: 98.4 (2022 20:30)  HR: 56 (10 Nov 2022 11:55) (53 - 56)  BP: 96/60 (10 Nov 2022 11:55) (96/60 - 132/72)  BP(mean): --  RR: 18 (10 Nov 2022 11:55) (18 - 18)  SpO2: 97% (10 Nov 2022 11:55) (97% - 99%)    Parameters below as of 10 Nov 2022 11:55  Patient On (Oxygen Delivery Method): room air      Daily     Daily       PHYSICAL EXAM:     GENERAL:  Appears stated age, well-groomed, well-nourished, no distress  HEENT:  NC/AT,  conjunctivae anicteric, clear and pink,   NECK: supple, trachea midline  CHEST:  Full & symmetric excursion, no increased effort, breath sounds clear  HEART:  Regular rhythm, no JVD  ABDOMEN:  Soft, +tender, non-distended, normoactive bowel sounds,  no masses , no hepatosplenomegaly  EXTREMITIES:  no cyanosis,clubbing or edema  SKIN:  No rash, erythema, or, ecchymoses, no jaundice  NEURO:  Alert, non-focal, no asterixis  PSYCH: Appropriate affect, oriented to place and time  RECTAL: Deferred      LABS Personally reviewed by me:                        13.8   3.75  )-----------( 178      ( 10 Nov 2022 07:11 )             38.6     Mean Cell Volume: 81.8 fl (11-10- @ 07:11)    11-10    126<L>  |  94<L>  |  18  ----------------------------<  83  4.2   |  22  |  1.25    Ca    8.8      10 Nov 2022 07:14  Phos  2.7     10  Mg     2.0     11-10          Urinalysis Basic - ( 2022 14:05 )    Color: Yellow / Appearance: Clear / S.031 / pH: x  Gluc: x / Ketone: Negative  / Bili: Negative / Urobili: Negative   Blood: x / Protein: 30 mg/dL / Nitrite: Negative   Leuk Esterase: Negative / RBC: 3 /hpf / WBC 3 /HPF   Sq Epi: x / Non Sq Epi: 1 /hpf / Bacteria: Negative                              13.8   3.75  )-----------( 178      ( 10 Nov 2022 07:11 )             38.6                         13.5   3.77  )-----------( 152      ( 2022 11:32 )             38.3       Imaging personally reviewed by me:          
  Chief Complaint:  Patient is a 66y old  Male who presents with a chief complaint of diverticulitis, hyponatremia (2022 14:42)      Date of service 22 @ 12:40      Interval Events:   Patient seen and examined.   States abdominal pain is improving.   Had BM this morning that was brown and semi-formed.     Hospital Medications:  influenza  Vaccine (HIGH DOSE) 0.7 milliLiter(s) IntraMuscular once  piperacillin/tazobactam IVPB.. 3.375 Gram(s) IV Intermittent every 8 hours  tamsulosin 0.4 milliGRAM(s) Oral at bedtime        Review of Systems:  General:  No wt loss, fevers, chills, night sweats, fatigue,   Eyes:  Good vision, no reported pain  ENT:  No sore throat, pain, runny nose, dysphagia  CV:  No pain, palpitations, hypo/hypertension  Resp:  No dyspnea, cough, tachypnea, wheezing  GI:  See HPI  :  No pain, bleeding, incontinence, nocturia  Muscle:  No pain, weakness  Neuro:  No weakness, tingling, memory problems  Psych:  No fatigue, insomnia, mood problems, depression  Endocrine:  No polyuria, polydipsia, cold/heat intolerance  Heme:  No petechiae, ecchymosis, easy bruisability  Integumentary:  No rash, edema    PHYSICAL EXAM:   Vital Signs:  Vital Signs Last 24 Hrs  T(C): 36.8 (2022 12:09), Max: 36.9 (2022 20:47)  T(F): 98.3 (2022 12:09), Max: 98.5 (2022 20:47)  HR: 69 (2022 12:09) (54 - 77)  BP: 118/72 (2022 12:09) (117/65 - 131/76)  BP(mean): --  RR: 18 (2022 12:09) (18 - 18)  SpO2: 100% (2022 12:09) (98% - 100%)    Parameters below as of 2022 12:09  Patient On (Oxygen Delivery Method): room air      Daily     Daily       PHYSICAL EXAM:     GENERAL:  Appears stated age, well-groomed, well-nourished, no distress  HEENT:  NC/AT,  conjunctivae anicteric, clear and pink,   NECK: supple, trachea midline  CHEST:  Full & symmetric excursion, no increased effort, breath sounds clear  HEART:  Regular rhythm, no JVD  ABDOMEN:  Soft, non-tender, non-distended, normoactive bowel sounds,  no masses , no hepatosplenomegaly  EXTREMITIES:  no cyanosis,clubbing or edema  SKIN:  No rash, erythema, or, ecchymoses, no jaundice  NEURO:  Alert, non-focal, no asterixis  PSYCH: Appropriate affect, oriented to place and time  RECTAL: Deferred      LABS Personally reviewed by me:                        13.5   3.77  )-----------( 152      ( 2022 11:32 )             38.3       11-09    121<L>  |  89<L>  |  17  ----------------------------<  84  3.5   |  19<L>  |  1.05    Ca    8.9      2022 07:18  Mg     1.9     11-    TPro  7.5  /  Alb  4.4  /  TBili  0.6  /  DBili  x   /  AST  20  /  ALT  19  /  AlkPhos  71  11-08    LIVER FUNCTIONS - ( 2022 11:32 )  Alb: 4.4 g/dL / Pro: 7.5 g/dL / ALK PHOS: 71 U/L / ALT: 19 U/L / AST: 20 U/L / GGT: x           PT/INR - ( 2022 11:32 )   PT: 13.6 sec;   INR: 1.18 ratio         PTT - ( 2022 11:32 )  PTT:34.1 sec  Urinalysis Basic - ( 2022 14:05 )    Color: Yellow / Appearance: Clear / S.031 / pH: x  Gluc: x / Ketone: Negative  / Bili: Negative / Urobili: Negative   Blood: x / Protein: 30 mg/dL / Nitrite: Negative   Leuk Esterase: Negative / RBC: 3 /hpf / WBC 3 /HPF   Sq Epi: x / Non Sq Epi: 1 /hpf / Bacteria: Negative                              13.5   3.77  )-----------( 152      ( 2022 11:32 )             38.3       Imaging personally reviewed by me:          
Date of service: 11-09-22 @ 23:46      Patient is a 66y old  Male who presents with a chief complaint of diverticulitis, hyponatremia (08 Nov 2022 14:42)                                                               INTERVAL HPI/OVERNIGHT EVENTS:    REVIEW OF SYSTEMS:     CONSTITUTIONAL: No weakness, fevers or chills  EYES/ENT: No visual changes , no ear ache   NECK: No pain or stiffness  RESPIRATORY: No cough, wheezing,  No shortness of breath  CARDIOVASCULAR: No chest pain or palpitations  GASTROINTESTINAL: No abdominal pain  . No nausea, vomiting, or hematemesis; No diarrhea or constipation. No melena or hematochezia.  GENITOURINARY: No dysuria, frequency or hematuria  NEUROLOGICAL: No numbness or weakness  SKIN: No itching, burning, rashes, or lesions                                                                                                                                                                                                                                                                                 Medications:  MEDICATIONS  (STANDING):  influenza  Vaccine (HIGH DOSE) 0.7 milliLiter(s) IntraMuscular once  piperacillin/tazobactam IVPB.. 3.375 Gram(s) IV Intermittent every 8 hours  potassium chloride    Tablet ER 40 milliEquivalent(s) Oral once  tamsulosin 0.4 milliGRAM(s) Oral at bedtime    MEDICATIONS  (PRN):       Allergies    No Known Allergies    Intolerances      Vital Signs Last 24 Hrs  T(C): 36.9 (09 Nov 2022 20:30), Max: 36.9 (09 Nov 2022 20:30)  T(F): 98.4 (09 Nov 2022 20:30), Max: 98.4 (09 Nov 2022 20:30)  HR: 54 (09 Nov 2022 20:30) (54 - 77)  BP: 132/72 (09 Nov 2022 20:30) (117/65 - 132/72)  BP(mean): --  RR: 18 (09 Nov 2022 20:30) (18 - 18)  SpO2: 98% (09 Nov 2022 20:30) (98% - 100%)    Parameters below as of 09 Nov 2022 20:30  Patient On (Oxygen Delivery Method): room air      CAPILLARY BLOOD GLUCOSE          Physical Exam:    Daily     Daily   General:  Well appearing, NAD, not cachetic  HEENT:  Nonicteric, PERRLA  CV:  RRR, S1S2   Lungs:  CTA B/L, no wheezes, rales, rhonchi  Abdomen:  Soft, non-tender, no distended, positive BS  Extremities:  2+ pulses, no c/c, no edema  Skin:  Warm and dry, no rashes  :  No wall  Neuro:  AAOx3, non-focal, grossly intact                                                                                                                                                                                                                                                                                                LABS:                               13.5   3.77  )-----------( 152      ( 08 Nov 2022 11:32 )             38.3                      11-09    126<L>  |  93<L>  |  22  ----------------------------<  107<H>  3.4<L>   |  22  |  1.45<H>    Ca    9.0      09 Nov 2022 21:09  Mg     1.9     11-08    TPro  7.5  /  Alb  4.4  /  TBili  0.6  /  DBili  x   /  AST  20  /  ALT  19  /  AlkPhos  71  11-08                       RADIOLOGY & ADDITIONAL TESTS         I personally reviewed: [  ]EKG   [  ]CXR    [  ] CT      A/P:         Discussed with :     Priscila consultants' Notes   Time spent :  
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 Nephrology consulted for hyponatremia.    REVIEW OF SYSTEMS:  Gen: no fevers  Cards: no chest pain  Resp: no dyspnea  GI: no nausea or vomiting or diarrhea, + ab pain improving  Vascular: no LE edema    No Known Allergies      Hospital Medications: Medications reviewed      VITALS:  T(F): 98.4 (22 @ 13:02), Max: 98.4 (22 @ 13:02)  HR: 58 (22 @ 13:02)  BP: 104/69 (22 @ 13:02)  RR: 20 (22 @ :02)  SpO2: 99% (22 @ :02)  Wt(kg): --    11-10 @ 07:01  -   @ 07:00  --------------------------------------------------------  IN: 0 mL / OUT: 1150 mL / NET: -1150 mL      PHYSICAL EXAM:    Gen: NAD, calm  Cards: RRR, +S1/S2, no M/G/R  Resp: CTA B/L  GI: soft, NT/ND, NABS  Vascular: no LE edema B/L        LABS:      135  |  101  |  17  ----------------------------<  85  4.0   |  23  |  1.28    Ca    9.7      2022 07:33  Phos  2.7     11-10  Mg     2.0     11-10      Creatinine Trend: 1.28 <--, 1.37 <--, 1.25 <--, 1.45 <--, 1.24 <--, 1.05 <--, 0.98 <--                        13.8   3.75  )-----------( 178      ( 10 Nov 2022 07:11 )             38.6     Urine Studies:  Urinalysis Basic - ( 2022 14:05 )    Color: Yellow / Appearance: Clear / S.031 / pH:   Gluc:  / Ketone: Negative  / Bili: Negative / Urobili: Negative   Blood:  / Protein: 30 mg/dL / Nitrite: Negative   Leuk Esterase: Negative / RBC: 3 /hpf / WBC 3 /HPF   Sq Epi:  / Non Sq Epi: 1 /hpf / Bacteria: Negative      Osmolality, Random Urine: 260 mos/kg ( @ 07:33)  Sodium, Random Urine: 12 mmol/L ( @ 07:33)  Sodium, Random Urine: 42 mmol/L (11-10 @ 07:25)  Osmolality, Random Urine: 864 mos/kg (11-10 @ 07:25)  Osmolality, Random Urine: 860 mos/kg ( @ 14:05)  Sodium, Random Urine: 74 mmol/L ( @ 14:05)  Creatinine, Random Urine: 211 mg/dL ( @ 14:05)  Protein/Creatinine Ratio Calculation: 0.1 Ratio ( @ 14:05)  Potassium, Random Urine: 90 mmol/L ( @ 14:05)          
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 Nephrology consulted for hyponatremia.    REVIEW OF SYSTEMS:  Gen: no fevers  Cards: no chest pain  Resp: no dyspnea  GI: no nausea or vomiting or diarrhea, + ab pain improving  Vascular: no LE edema    No Known Allergies      Hospital Medications: Medications reviewed      VITALS:  T(F): 97.7 (22 @ 05:36), Max: 99 (22 @ 20:18)  HR: 60 (22 @ 05:36)  BP: 120/70 (22 @ 05:36)  RR: 18 (22 @ 05:36)  SpO2: 98% (22 @ 05:36)  Wt(kg): --     @ 07:01  -   @ 07:00  --------------------------------------------------------  IN: 360 mL / OUT: 400 mL / NET: -40 mL      PHYSICAL EXAM:    Gen: NAD, calm  Cards: RRR, +S1/S2, no M/G/R  Resp: CTA B/L  GI: soft, NT/ND, NABS  Vascular: no LE edema B/L      LABS:    139 <--, 138 <--, 135 <--, 134 <--, 126 <--, 126 <--, 126 <--  139  |  103  |  27<H>  ----------------------------<  83  4.3   |  23  |  1.42<H>    Ca    9.6      2022 07:24  Phos  3.9       Mg     2.3           Creatinine Trend: 1.42 <--, 1.47 <--, 1.28 <--, 1.37 <--, 1.25 <--, 1.45 <--, 1.24 <--, 1.05 <--, 0.98 <--                        13.6   5.99  )-----------( 208      ( 2022 07:28 )             39.5     Urine Studies:  Urinalysis Basic - ( 2022 14:05 )    Color: Yellow / Appearance: Clear / S.031 / pH:   Gluc:  / Ketone: Negative  / Bili: Negative / Urobili: Negative   Blood:  / Protein: 30 mg/dL / Nitrite: Negative   Leuk Esterase: Negative / RBC: 3 /hpf / WBC 3 /HPF   Sq Epi:  / Non Sq Epi: 1 /hpf / Bacteria: Negative      Osmolality, Random Urine: 857 mos/kg ( @ 07:31)  Osmolality, Random Urine: 260 mos/kg ( @ 07:33)  Sodium, Random Urine: 12 mmol/L ( @ 07:33)  Sodium, Random Urine: 42 mmol/L (11-10 @ 07:25)  Osmolality, Random Urine: 864 mos/kg (11-10 @ 07:25)  Osmolality, Random Urine: 860 mos/kg ( @ 14:05)  Sodium, Random Urine: 74 mmol/L ( @ 14:05)  Creatinine, Random Urine: 211 mg/dL ( @ 14:05)  Protein/Creatinine Ratio Calculation: 0.1 Ratio ( @ 14:05)  Potassium, Random Urine: 90 mmol/L ( @ 14:05)    
Date of service: 11-11-22 @ 15:30      Patient is a 66y old  Male who presents with a chief complaint of diverticulitis, hyponatremia (08 Nov 2022 14:42)                                                               INTERVAL HPI/OVERNIGHT EVENTS:    REVIEW OF SYSTEMS:     CONSTITUTIONAL: No weakness, fevers or chills  EYES/ENT: No visual changes , no ear ache   NECK: No pain or stiffness  RESPIRATORY: No cough, wheezing,  No shortness of breath  CARDIOVASCULAR: No chest pain or palpitations  GASTROINTESTINAL: No abdominal pain  . No nausea, vomiting, or hematemesis; No diarrhea or constipation. No melena or hematochezia.  GENITOURINARY: No dysuria, frequency or hematuria  NEUROLOGICAL: No numbness or weakness  SKIN: No itching, burning, rashes, or lesions                                                                                                                                                                                                                                                                                 Medications:  MEDICATIONS  (STANDING):  influenza  Vaccine (HIGH DOSE) 0.7 milliLiter(s) IntraMuscular once  piperacillin/tazobactam IVPB.. 3.375 Gram(s) IV Intermittent every 8 hours  tamsulosin 0.4 milliGRAM(s) Oral at bedtime    MEDICATIONS  (PRN):       Allergies    No Known Allergies    Intolerances      Vital Signs Last 24 Hrs  T(C): 36.9 (11 Nov 2022 13:02), Max: 36.9 (11 Nov 2022 13:02)  T(F): 98.4 (11 Nov 2022 13:02), Max: 98.4 (11 Nov 2022 13:02)  HR: 58 (11 Nov 2022 13:02) (52 - 60)  BP: 104/69 (11 Nov 2022 13:02) (104/64 - 106/63)  BP(mean): --  RR: 20 (11 Nov 2022 13:02) (18 - 20)  SpO2: 99% (11 Nov 2022 13:02) (99% - 99%)    Parameters below as of 11 Nov 2022 13:02  Patient On (Oxygen Delivery Method): room air      CAPILLARY BLOOD GLUCOSE          11-10 @ 07:01  -  11-11 @ 07:00  --------------------------------------------------------  IN: 0 mL / OUT: 1150 mL / NET: -1150 mL      Physical Exam:    Daily     Daily   General:  Well appearing, NAD, not cachetic  HEENT:  Nonicteric, PERRLA  CV:  RRR, S1S2   Lungs:  CTA B/L, no wheezes, rales, rhonchi  Abdomen:  Soft, non-tender, no distended, positive BS  Extremities:  2+ pulses, no c/c, no edema  Skin:  Warm and dry, no rashes  :  No wall  Neuro:  AAOx3, non-focal, grossly intact                                                                                                                                                                                                                                                                                                LABS:                               13.8   3.75  )-----------( 178      ( 10 Nov 2022 07:11 )             38.6                      11-11    135  |  101  |  17  ----------------------------<  85  4.0   |  23  |  1.28    Ca    9.7      11 Nov 2022 07:33  Phos  2.7     11-10  Mg     2.0     11-10                         RADIOLOGY & ADDITIONAL TESTS         I personally reviewed: [  ]EKG   [  ]CXR    [  ] CT      A/P:         Discussed with :     Priscila consultants' Notes   Time spent :

## 2022-11-12 NOTE — DISCHARGE NOTE PROVIDER - NSDCCPCAREPLAN_GEN_ALL_CORE_FT
PRINCIPAL DISCHARGE DIAGNOSIS  Diagnosis: Diverticulitis  Assessment and Plan of Treatment: Diverticulitis occurs when pouches form in the wall of the colon and become inflamed or infected. It can be very painful. There is no proof that foods such as nuts, seeds, or berries cause it or make it worse. A low-fibre diet may cause the colon to work harder to push stool forward. Pouches may form because of this extra work.  --  It may be hard to think about healthy eating while you're in pain. But as you recover, you might think about how you can use healthy eating for overall better health. Healthy eating may help you avoid future attacks.  --  You were prescribed antibiotics to completely treat your diverticulitis. Continue to take this medication as directed. Do not stop taking your medication until it is finished.   --  Seek immediate medical care if:  You have a fever.  You are vomiting.  You have new or worse belly pain.  You cannot pass stools or gas.      SECONDARY DISCHARGE DIAGNOSES  Diagnosis: Hyponatremia  Assessment and Plan of Treatment: Hyponatremia (say "[id number]-[id number]-[id number]-uh") means that you don't have enough sodium in your blood. It can cause nausea, vomiting, and headaches. Or you may not feel hungry. In serious cases, it can cause seizures, a coma, or even death. Hyponatremia is not a disease. It is a problem caused by something else, such as medicines or exercising for a long time in hot weather.   ---  You can get hyponatremia if you lose a lot of fluids and then you drink a lot of water or other liquids that don't have much sodium. You can also get it if you have kidney, liver, heart, or other health problems.  ---  Treatment is focused on getting your sodium levels back to normal. In order to do this, we started on you on a medication which you should continue to take as directed  --  Call your doctor or nurse call line now or seek immediate medical care if:  You are confused or it is hard to focus.  You have little or no appetite.  You feel sick to your stomach or you vomit.  You have a headache.  You have mood changes.  You feel more tired than usual.

## 2022-11-12 NOTE — DISCHARGE NOTE PROVIDER - CARE PROVIDER_API CALL
Jennifer Vizcaino)  Internal Medicine  [address] [place] [address]  Phone: [phone]  Fax: [phone]  Follow Up Time:     Shant Harrison)  Gastroenterology; Internal Medicine  [address] [place] [address]  Phone: [phone]  Fax: [phone]  Follow Up Time:

## 2023-03-08 ENCOUNTER — APPOINTMENT (OUTPATIENT)
Dept: COLORECTAL SURGERY | Facility: CLINIC | Age: 68
End: 2023-03-08
Payer: COMMERCIAL

## 2023-03-08 VITALS
TEMPERATURE: 97.3 F | DIASTOLIC BLOOD PRESSURE: 70 MMHG | SYSTOLIC BLOOD PRESSURE: 120 MMHG | HEIGHT: 72 IN | BODY MASS INDEX: 25.6 KG/M2 | HEART RATE: 54 BPM | RESPIRATION RATE: 12 BRPM | WEIGHT: 189 LBS

## 2023-03-08 DIAGNOSIS — K21.9 GASTRO-ESOPHAGEAL REFLUX DISEASE W/OUT ESOPHAGITIS: ICD-10-CM

## 2023-03-08 DIAGNOSIS — Z83.3 FAMILY HISTORY OF DIABETES MELLITUS: ICD-10-CM

## 2023-03-08 DIAGNOSIS — Z87.438 PERSONAL HISTORY OF OTHER DISEASES OF MALE GENITAL ORGANS: ICD-10-CM

## 2023-03-08 DIAGNOSIS — Z83.71 FAMILY HISTORY OF COLONIC POLYPS: ICD-10-CM

## 2023-03-08 DIAGNOSIS — Z82.0 FAMILY HISTORY OF EPILEPSY AND OTHER DISEASES OF THE NERVOUS SYSTEM: ICD-10-CM

## 2023-03-08 DIAGNOSIS — Z87.891 PERSONAL HISTORY OF NICOTINE DEPENDENCE: ICD-10-CM

## 2023-03-08 DIAGNOSIS — U07.1 COVID-19: ICD-10-CM

## 2023-03-08 PROCEDURE — 99244 OFF/OP CNSLTJ NEW/EST MOD 40: CPT

## 2023-03-08 RX ORDER — OMEPRAZOLE 20 MG/1
20 CAPSULE, DELAYED RELEASE ORAL
Refills: 0 | Status: ACTIVE | COMMUNITY

## 2023-03-08 RX ORDER — ASCORBIC ACID 500 MG
TABLET ORAL
Refills: 0 | Status: ACTIVE | COMMUNITY

## 2023-03-08 RX ORDER — TAMSULOSIN HYDROCHLORIDE 0.4 MG/1
0.4 CAPSULE ORAL
Refills: 0 | Status: ACTIVE | COMMUNITY

## 2023-03-08 RX ORDER — PNV NO.95/FERROUS FUM/FOLIC AC 28MG-0.8MG
TABLET ORAL
Refills: 0 | Status: ACTIVE | COMMUNITY

## 2023-03-08 RX ORDER — CHOLECALCIFEROL (VITAMIN D3) 25 MCG
TABLET ORAL
Refills: 0 | Status: ACTIVE | COMMUNITY

## 2023-03-08 RX ORDER — CITALOPRAM HYDROBROMIDE 20 MG/1
20 TABLET, FILM COATED ORAL
Refills: 0 | Status: ACTIVE | COMMUNITY

## 2023-03-08 RX ORDER — DESMOPRESSIN ACETATE 0.1 MG/ML
0.01 SPRAY NASAL
Refills: 0 | Status: ACTIVE | COMMUNITY

## 2023-03-13 ENCOUNTER — OUTPATIENT (OUTPATIENT)
Dept: OUTPATIENT SERVICES | Facility: HOSPITAL | Age: 68
LOS: 1 days | End: 2023-03-13
Payer: COMMERCIAL

## 2023-03-13 ENCOUNTER — RESULT REVIEW (OUTPATIENT)
Age: 68
End: 2023-03-13

## 2023-03-13 ENCOUNTER — APPOINTMENT (OUTPATIENT)
Dept: CT IMAGING | Facility: CLINIC | Age: 68
End: 2023-03-13
Payer: COMMERCIAL

## 2023-03-13 DIAGNOSIS — Z00.8 ENCOUNTER FOR OTHER GENERAL EXAMINATION: ICD-10-CM

## 2023-03-13 DIAGNOSIS — D35.2 BENIGN NEOPLASM OF PITUITARY GLAND: Chronic | ICD-10-CM

## 2023-03-13 PROCEDURE — 74177 CT ABD & PELVIS W/CONTRAST: CPT

## 2023-03-13 PROCEDURE — 74177 CT ABD & PELVIS W/CONTRAST: CPT | Mod: 26

## 2023-03-16 ENCOUNTER — OUTPATIENT (OUTPATIENT)
Dept: OUTPATIENT SERVICES | Facility: HOSPITAL | Age: 68
LOS: 1 days | End: 2023-03-16
Payer: COMMERCIAL

## 2023-03-16 VITALS
RESPIRATION RATE: 18 BRPM | OXYGEN SATURATION: 98 % | WEIGHT: 186.95 LBS | SYSTOLIC BLOOD PRESSURE: 109 MMHG | TEMPERATURE: 99 F | HEIGHT: 72 IN | DIASTOLIC BLOOD PRESSURE: 61 MMHG | HEART RATE: 990 BPM

## 2023-03-16 DIAGNOSIS — K57.32 DIVERTICULITIS OF LARGE INTESTINE WITHOUT PERFORATION OR ABSCESS WITHOUT BLEEDING: ICD-10-CM

## 2023-03-16 DIAGNOSIS — Z86.39 PERSONAL HISTORY OF OTHER ENDOCRINE, NUTRITIONAL AND METABOLIC DISEASE: ICD-10-CM

## 2023-03-16 DIAGNOSIS — D35.2 BENIGN NEOPLASM OF PITUITARY GLAND: Chronic | ICD-10-CM

## 2023-03-16 DIAGNOSIS — Z98.890 OTHER SPECIFIED POSTPROCEDURAL STATES: Chronic | ICD-10-CM

## 2023-03-16 DIAGNOSIS — K57.92 DIVERTICULITIS OF INTESTINE, PART UNSPECIFIED, WITHOUT PERFORATION OR ABSCESS WITHOUT BLEEDING: ICD-10-CM

## 2023-03-16 DIAGNOSIS — Z29.9 ENCOUNTER FOR PROPHYLACTIC MEASURES, UNSPECIFIED: ICD-10-CM

## 2023-03-16 DIAGNOSIS — M54.9 DORSALGIA, UNSPECIFIED: ICD-10-CM

## 2023-03-16 DIAGNOSIS — Z01.818 ENCOUNTER FOR OTHER PREPROCEDURAL EXAMINATION: ICD-10-CM

## 2023-03-16 LAB
ANION GAP SERPL CALC-SCNC: 12 MMOL/L — SIGNIFICANT CHANGE UP (ref 5–17)
BLD GP AB SCN SERPL QL: NEGATIVE — SIGNIFICANT CHANGE UP
BUN SERPL-MCNC: 22 MG/DL — SIGNIFICANT CHANGE UP (ref 7–23)
CALCIUM SERPL-MCNC: 9.2 MG/DL — SIGNIFICANT CHANGE UP (ref 8.4–10.5)
CHLORIDE SERPL-SCNC: 100 MMOL/L — SIGNIFICANT CHANGE UP (ref 96–108)
CO2 SERPL-SCNC: 21 MMOL/L — LOW (ref 22–31)
CREAT SERPL-MCNC: 1.18 MG/DL — SIGNIFICANT CHANGE UP (ref 0.5–1.3)
EGFR: 68 ML/MIN/1.73M2 — SIGNIFICANT CHANGE UP
GLUCOSE SERPL-MCNC: 107 MG/DL — HIGH (ref 70–99)
HCT VFR BLD CALC: 37.9 % — LOW (ref 39–50)
HGB BLD-MCNC: 13.1 G/DL — SIGNIFICANT CHANGE UP (ref 13–17)
MCHC RBC-ENTMCNC: 29.7 PG — SIGNIFICANT CHANGE UP (ref 27–34)
MCHC RBC-ENTMCNC: 34.6 GM/DL — SIGNIFICANT CHANGE UP (ref 32–36)
MCV RBC AUTO: 85.9 FL — SIGNIFICANT CHANGE UP (ref 80–100)
NRBC # BLD: 0 /100 WBCS — SIGNIFICANT CHANGE UP (ref 0–0)
PLATELET # BLD AUTO: 187 K/UL — SIGNIFICANT CHANGE UP (ref 150–400)
POTASSIUM SERPL-MCNC: 3.9 MMOL/L — SIGNIFICANT CHANGE UP (ref 3.5–5.3)
POTASSIUM SERPL-SCNC: 3.9 MMOL/L — SIGNIFICANT CHANGE UP (ref 3.5–5.3)
RBC # BLD: 4.41 M/UL — SIGNIFICANT CHANGE UP (ref 4.2–5.8)
RBC # FLD: 13.4 % — SIGNIFICANT CHANGE UP (ref 10.3–14.5)
RH IG SCN BLD-IMP: POSITIVE — SIGNIFICANT CHANGE UP
SODIUM SERPL-SCNC: 133 MMOL/L — LOW (ref 135–145)
WBC # BLD: 4.82 K/UL — SIGNIFICANT CHANGE UP (ref 3.8–10.5)
WBC # FLD AUTO: 4.82 K/UL — SIGNIFICANT CHANGE UP (ref 3.8–10.5)

## 2023-03-16 PROCEDURE — 86900 BLOOD TYPING SEROLOGIC ABO: CPT

## 2023-03-16 PROCEDURE — 80048 BASIC METABOLIC PNL TOTAL CA: CPT

## 2023-03-16 PROCEDURE — 83036 HEMOGLOBIN GLYCOSYLATED A1C: CPT

## 2023-03-16 PROCEDURE — 85027 COMPLETE CBC AUTOMATED: CPT

## 2023-03-16 PROCEDURE — 86850 RBC ANTIBODY SCREEN: CPT

## 2023-03-16 PROCEDURE — 86901 BLOOD TYPING SEROLOGIC RH(D): CPT

## 2023-03-16 PROCEDURE — 87086 URINE CULTURE/COLONY COUNT: CPT

## 2023-03-16 PROCEDURE — G0463: CPT

## 2023-03-16 PROCEDURE — 36415 COLL VENOUS BLD VENIPUNCTURE: CPT

## 2023-03-16 RX ORDER — LIDOCAINE HCL 20 MG/ML
0.2 VIAL (ML) INJECTION ONCE
Refills: 0 | Status: DISCONTINUED | OUTPATIENT
Start: 2023-03-28 | End: 2023-04-02

## 2023-03-16 RX ORDER — CEFOTETAN DISODIUM 1 G
2 VIAL (EA) INJECTION ONCE
Refills: 0 | Status: DISCONTINUED | OUTPATIENT
Start: 2023-03-28 | End: 2023-03-30

## 2023-03-16 NOTE — H&P PST ADULT - HISTORY OF PRESENT ILLNESS
67 year old male, with a history of recurrent diverticulitis, presents for ERAS/Low anterior resection. Pt. most recent episode of diverticulitis was in Feburary 2023 requiring IV hydration & IV antibiotics. Pt. reports being placed on oral cipro and flagyl by Dr. Berumen over the last few days and reports experiencing diarrhea and abdominal discomfort X 2 days. Pt. reports notifying Dr. Berumen's office via telephone and was instructed to stop cipro and flagyl. Diarrhea has improved but nausea and flank pain has not improved. 67 year old male, with a history of recurrent diverticulitis, presents for ERAS/Low anterior resection. Pt. most recent episode of diverticulitis was in Feburary 2023 requiring IV hydration & IV antibiotics. Pt. reports being placed on oral cipro and flagyl by Dr. Berumen over the last few days and reports experiencing nausea, diarrhea and left lower back pain X 2 days. Pt. reports notifying Dr. Berumen's office via telephone and was instructed to stop cipro and flagyl. Diarrhea with mild resolution but nausea and back pain has not improved.    Pt. reports having COVID-19 in March 2020; PCR scheduled for 3/25/23.

## 2023-03-16 NOTE — H&P PST ADULT - NSICDXPASTSURGICALHX_GEN_ALL_CORE_FT
PAST SURGICAL HISTORY:  Adenoma of pituitary     History of bunionectomy     History of incisional hernia repair

## 2023-03-16 NOTE — H&P PST ADULT - NSICDXPASTMEDICALHX_GEN_ALL_CORE_FT
PAST MEDICAL HISTORY:  Acute diverticulitis     COVID-19     Diabetes insipidus     GERD (gastroesophageal reflux disease)     History of BPH     Pituitary adenoma      PAST MEDICAL HISTORY:  Acute diverticulitis     COVID-19     Diabetes insipidus     GERD (gastroesophageal reflux disease)     H/O vertigo     History of BPH     Hyponatremia     Pituitary adenoma

## 2023-03-16 NOTE — H&P PST ADULT - PROBLEM SELECTOR PLAN 4
Pt. choosing to go to ER at [facility] for evaluation of back pain. Dr. Berumen notified via email.

## 2023-03-16 NOTE — H&P PST ADULT - PROBLEM SELECTOR PLAN 2
Case reviewed with Dr. Gonzalez; instructed patient to take his scheduled dose of desmopressin 0.01% intranasal the night before procedure as per routine and to bring desmopressin nasal spray to hospital day of procedure

## 2023-03-17 LAB
A1C WITH ESTIMATED AVERAGE GLUCOSE RESULT: 5.4 % — SIGNIFICANT CHANGE UP (ref 4–5.6)
CULTURE RESULTS: SIGNIFICANT CHANGE UP
ESTIMATED AVERAGE GLUCOSE: 108 MG/DL — SIGNIFICANT CHANGE UP (ref 68–114)
SPECIMEN SOURCE: SIGNIFICANT CHANGE UP

## 2023-03-25 ENCOUNTER — OUTPATIENT (OUTPATIENT)
Dept: OUTPATIENT SERVICES | Facility: HOSPITAL | Age: 68
LOS: 1 days | End: 2023-03-25
Payer: COMMERCIAL

## 2023-03-25 DIAGNOSIS — Z98.890 OTHER SPECIFIED POSTPROCEDURAL STATES: Chronic | ICD-10-CM

## 2023-03-25 DIAGNOSIS — Z11.52 ENCOUNTER FOR SCREENING FOR COVID-19: ICD-10-CM

## 2023-03-25 DIAGNOSIS — D35.2 BENIGN NEOPLASM OF PITUITARY GLAND: Chronic | ICD-10-CM

## 2023-03-25 LAB — SARS-COV-2 RNA SPEC QL NAA+PROBE: SIGNIFICANT CHANGE UP

## 2023-03-25 PROCEDURE — U0003: CPT

## 2023-03-25 PROCEDURE — C9803: CPT

## 2023-03-25 PROCEDURE — U0005: CPT

## 2023-03-27 ENCOUNTER — TRANSCRIPTION ENCOUNTER (OUTPATIENT)
Age: 68
End: 2023-03-27

## 2023-03-28 ENCOUNTER — APPOINTMENT (OUTPATIENT)
Dept: COLORECTAL SURGERY | Facility: HOSPITAL | Age: 68
End: 2023-03-28
Payer: COMMERCIAL

## 2023-03-28 ENCOUNTER — RESULT REVIEW (OUTPATIENT)
Age: 68
End: 2023-03-28

## 2023-03-28 ENCOUNTER — INPATIENT (INPATIENT)
Facility: HOSPITAL | Age: 68
LOS: 4 days | Discharge: HOME | DRG: 330 | End: 2023-04-02
Attending: SURGERY | Admitting: SURGERY
Payer: COMMERCIAL

## 2023-03-28 VITALS
WEIGHT: 186.95 LBS | HEART RATE: 72 BPM | DIASTOLIC BLOOD PRESSURE: 63 MMHG | TEMPERATURE: 99 F | RESPIRATION RATE: 18 BRPM | SYSTOLIC BLOOD PRESSURE: 116 MMHG | OXYGEN SATURATION: 97 % | HEIGHT: 73.98 IN

## 2023-03-28 DIAGNOSIS — K57.32 DIVERTICULITIS OF LARGE INTESTINE WITHOUT PERFORATION OR ABSCESS WITHOUT BLEEDING: ICD-10-CM

## 2023-03-28 DIAGNOSIS — Z98.890 OTHER SPECIFIED POSTPROCEDURAL STATES: Chronic | ICD-10-CM

## 2023-03-28 DIAGNOSIS — D35.2 BENIGN NEOPLASM OF PITUITARY GLAND: Chronic | ICD-10-CM

## 2023-03-28 LAB
BASE EXCESS BLDV CALC-SCNC: -3.5 MMOL/L — LOW (ref -2–3)
CA-I SERPL-SCNC: 1.24 MMOL/L — SIGNIFICANT CHANGE UP (ref 1.15–1.33)
CHLORIDE BLDV-SCNC: 103 MMOL/L — SIGNIFICANT CHANGE UP (ref 96–108)
CO2 BLDV-SCNC: 21 MMOL/L — LOW (ref 22–26)
GAS PNL BLDV: 132 MMOL/L — LOW (ref 136–145)
GAS PNL BLDV: SIGNIFICANT CHANGE UP
GAS PNL BLDV: SIGNIFICANT CHANGE UP
GLUCOSE BLDC GLUCOMTR-MCNC: 116 MG/DL — HIGH (ref 70–99)
GLUCOSE BLDV-MCNC: 124 MG/DL — HIGH (ref 70–99)
HCO3 BLDV-SCNC: 20 MMOL/L — LOW (ref 22–29)
HCT VFR BLDA CALC: 42 % — SIGNIFICANT CHANGE UP (ref 39–51)
HGB BLD CALC-MCNC: 13.9 G/DL — SIGNIFICANT CHANGE UP (ref 12.6–17.4)
LACTATE BLDV-MCNC: 0.9 MMOL/L — SIGNIFICANT CHANGE UP (ref 0.5–2)
PCO2 BLDV: 33 MMHG — LOW (ref 42–55)
PH BLDV: 7.4 — SIGNIFICANT CHANGE UP (ref 7.32–7.43)
PO2 BLDV: 42 MMHG — SIGNIFICANT CHANGE UP (ref 25–45)
POTASSIUM BLDV-SCNC: 3.6 MMOL/L — SIGNIFICANT CHANGE UP (ref 3.5–5.1)
SAO2 % BLDV: 75.4 % — SIGNIFICANT CHANGE UP (ref 67–88)

## 2023-03-28 PROCEDURE — 44207 L COLECTOMY/COLOPROCTOSTOMY: CPT

## 2023-03-28 PROCEDURE — 88307 TISSUE EXAM BY PATHOLOGIST: CPT | Mod: 26

## 2023-03-28 PROCEDURE — 52005 CYSTO W/URTRL CATHJ: CPT

## 2023-03-28 PROCEDURE — 45300 PROCTOSIGMOIDOSCOPY DX: CPT

## 2023-03-28 DEVICE — URETERAL CATH WHISTLE TIP 5FR LEFT: Type: IMPLANTABLE DEVICE | Status: FUNCTIONAL

## 2023-03-28 DEVICE — URETERAL CATH OPEN END 5FR 70CM: Type: IMPLANTABLE DEVICE | Status: FUNCTIONAL

## 2023-03-28 DEVICE — STAPLER ETHICON CIRCULAR XL 29MM: Type: IMPLANTABLE DEVICE | Status: FUNCTIONAL

## 2023-03-28 DEVICE — VISTASEAL FIBRIN HUMAN 4ML: Type: IMPLANTABLE DEVICE | Status: FUNCTIONAL

## 2023-03-28 DEVICE — URETERAL CATH WHISTLE TIP 5FR: Type: IMPLANTABLE DEVICE | Status: FUNCTIONAL

## 2023-03-28 DEVICE — STAPLER COVIDIEN PURSTRING 65MM: Type: IMPLANTABLE DEVICE | Status: FUNCTIONAL

## 2023-03-28 DEVICE — STAPLER CONTOUR CURVED WITH BLUE CART: Type: IMPLANTABLE DEVICE | Status: FUNCTIONAL

## 2023-03-28 RX ORDER — ONDANSETRON 8 MG/1
4 TABLET, FILM COATED ORAL EVERY 6 HOURS
Refills: 0 | Status: DISCONTINUED | OUTPATIENT
Start: 2023-03-28 | End: 2023-03-29

## 2023-03-28 RX ORDER — CELECOXIB 200 MG/1
400 CAPSULE ORAL ONCE
Refills: 0 | Status: COMPLETED | OUTPATIENT
Start: 2023-03-28 | End: 2023-03-28

## 2023-03-28 RX ORDER — ENOXAPARIN SODIUM 100 MG/ML
40 INJECTION SUBCUTANEOUS EVERY 24 HOURS
Refills: 0 | Status: DISCONTINUED | OUTPATIENT
Start: 2023-03-29 | End: 2023-04-02

## 2023-03-28 RX ORDER — HYDROMORPHONE HYDROCHLORIDE 2 MG/ML
0.5 INJECTION INTRAMUSCULAR; INTRAVENOUS; SUBCUTANEOUS
Refills: 0 | Status: DISCONTINUED | OUTPATIENT
Start: 2023-03-28 | End: 2023-03-29

## 2023-03-28 RX ORDER — INFLUENZA VIRUS VACCINE 15; 15; 15; 15 UG/.5ML; UG/.5ML; UG/.5ML; UG/.5ML
0.7 SUSPENSION INTRAMUSCULAR ONCE
Refills: 0 | Status: DISCONTINUED | OUTPATIENT
Start: 2023-03-28 | End: 2023-04-02

## 2023-03-28 RX ORDER — MORPHINE SULFATE 50 MG/1
0.3 CAPSULE, EXTENDED RELEASE ORAL ONCE
Refills: 0 | Status: DISCONTINUED | OUTPATIENT
Start: 2023-03-28 | End: 2023-03-29

## 2023-03-28 RX ORDER — CITALOPRAM 10 MG/1
20 TABLET, FILM COATED ORAL DAILY
Refills: 0 | Status: DISCONTINUED | OUTPATIENT
Start: 2023-03-28 | End: 2023-04-02

## 2023-03-28 RX ORDER — OXYCODONE HYDROCHLORIDE 5 MG/1
10 TABLET ORAL
Refills: 0 | Status: DISCONTINUED | OUTPATIENT
Start: 2023-03-28 | End: 2023-03-29

## 2023-03-28 RX ORDER — ACETAMINOPHEN 500 MG
1000 TABLET ORAL EVERY 6 HOURS
Refills: 0 | Status: COMPLETED | OUTPATIENT
Start: 2023-03-28 | End: 2023-03-29

## 2023-03-28 RX ORDER — TAMSULOSIN HYDROCHLORIDE 0.4 MG/1
0.4 CAPSULE ORAL AT BEDTIME
Refills: 0 | Status: DISCONTINUED | OUTPATIENT
Start: 2023-03-28 | End: 2023-04-02

## 2023-03-28 RX ORDER — CHLORHEXIDINE GLUCONATE 213 G/1000ML
1 SOLUTION TOPICAL ONCE
Refills: 0 | Status: COMPLETED | OUTPATIENT
Start: 2023-03-28 | End: 2023-03-28

## 2023-03-28 RX ORDER — NALOXONE HYDROCHLORIDE 4 MG/.1ML
0.1 SPRAY NASAL
Refills: 0 | Status: DISCONTINUED | OUTPATIENT
Start: 2023-03-28 | End: 2023-03-29

## 2023-03-28 RX ORDER — PANTOPRAZOLE SODIUM 20 MG/1
40 TABLET, DELAYED RELEASE ORAL
Refills: 0 | Status: DISCONTINUED | OUTPATIENT
Start: 2023-03-28 | End: 2023-04-02

## 2023-03-28 RX ORDER — HYDROMORPHONE HYDROCHLORIDE 2 MG/ML
0.5 INJECTION INTRAMUSCULAR; INTRAVENOUS; SUBCUTANEOUS
Refills: 0 | Status: DISCONTINUED | OUTPATIENT
Start: 2023-03-28 | End: 2023-03-28

## 2023-03-28 RX ORDER — SODIUM CHLORIDE 9 MG/ML
1000 INJECTION, SOLUTION INTRAVENOUS
Refills: 0 | Status: DISCONTINUED | OUTPATIENT
Start: 2023-03-28 | End: 2023-03-29

## 2023-03-28 RX ORDER — OXYCODONE HYDROCHLORIDE 5 MG/1
5 TABLET ORAL
Refills: 0 | Status: DISCONTINUED | OUTPATIENT
Start: 2023-03-28 | End: 2023-03-29

## 2023-03-28 RX ORDER — DESMOPRESSIN ACETATE 0.1 MG/1
1 TABLET ORAL AT BEDTIME
Refills: 0 | Status: DISCONTINUED | OUTPATIENT
Start: 2023-03-28 | End: 2023-03-28

## 2023-03-28 RX ORDER — SODIUM CHLORIDE 9 MG/ML
3 INJECTION INTRAMUSCULAR; INTRAVENOUS; SUBCUTANEOUS EVERY 8 HOURS
Refills: 0 | Status: DISCONTINUED | OUTPATIENT
Start: 2023-03-28 | End: 2023-03-28

## 2023-03-28 RX ORDER — ONDANSETRON 8 MG/1
4 TABLET, FILM COATED ORAL ONCE
Refills: 0 | Status: DISCONTINUED | OUTPATIENT
Start: 2023-03-28 | End: 2023-03-28

## 2023-03-28 RX ORDER — GABAPENTIN 400 MG/1
600 CAPSULE ORAL ONCE
Refills: 0 | Status: COMPLETED | OUTPATIENT
Start: 2023-03-28 | End: 2023-03-28

## 2023-03-28 RX ORDER — DIPHENHYDRAMINE HCL 50 MG
25 CAPSULE ORAL EVERY 4 HOURS
Refills: 0 | Status: DISCONTINUED | OUTPATIENT
Start: 2023-03-28 | End: 2023-03-29

## 2023-03-28 RX ADMIN — Medication 1000 MILLIGRAM(S): at 17:54

## 2023-03-28 RX ADMIN — GABAPENTIN 600 MILLIGRAM(S): 400 CAPSULE ORAL at 08:10

## 2023-03-28 RX ADMIN — CELECOXIB 400 MILLIGRAM(S): 200 CAPSULE ORAL at 08:10

## 2023-03-28 RX ADMIN — CHLORHEXIDINE GLUCONATE 1 APPLICATION(S): 213 SOLUTION TOPICAL at 08:10

## 2023-03-28 RX ADMIN — TAMSULOSIN HYDROCHLORIDE 0.4 MILLIGRAM(S): 0.4 CAPSULE ORAL at 21:37

## 2023-03-28 RX ADMIN — Medication 400 MILLIGRAM(S): at 17:39

## 2023-03-28 NOTE — PATIENT PROFILE ADULT - FALL HARM RISK - UNIVERSAL INTERVENTIONS
Bed in lowest position, wheels locked, appropriate side rails in place/Call bell, personal items and telephone in reach/Instruct patient to call for assistance before getting out of bed or chair/Non-slip footwear when patient is out of bed/[place] to call system/Physically safe environment - no spills, clutter or unnecessary equipment/Purposeful Proactive Rounding/Room/bathroom lighting operational, light cord in reach

## 2023-03-28 NOTE — PATIENT PROFILE ADULT - DO YOU FEEL THREATENED BY OTHERS?
no
The patient has been re-examined and I agree with the above assessment or I updated with my findings.

## 2023-03-28 NOTE — CHART NOTE - NSCHARTNOTEFT_GEN_A_CORE
Post Operative Note  Patient: LAURA CARRILLO 67y (1955) Male   MRN: [id number]  Location: [facility] 2MON 218 D1  Visit: 03-28-23 Inpatient  Date: 03-28-23 @ 18:56    Procedure: S/P Laparoscopic low anterior resection.    Subjective: Patient seen and examined post operatively. Reports pain as controlled. Denies nausea, vomiting, fever, chills, chest pain, SOB, cough.      Objective:  Vitals: T(F): 98.5 (03-28-23 @ 18:19), Max: 98.6 (03-28-23 @ 07:10)  HR: 68 (03-28-23 @ 18:19)  BP: 107/61 (03-28-23 @ 18:19) (98/54 - 119/74)  RR: 18 (03-28-23 @ 18:19)  SpO2: 99% (03-28-23 @ 18:19)  Vent Settings:     In:   03-28-23 @ 07:01  -  03-28-23 @ 18:56  --------------------------------------------------------  IN: 280 mL      IV Fluids: lactated ringers. 1000 milliLiter(s) (40 mL/Hr) IV Continuous <Continuous>      Out:   03-28-23 @ 07:01  -  03-28-23 @ 18:56  --------------------------------------------------------  OUT: 325 mL      EBL:     Voided Urine:   03-28-23 @ 07:01  -  03-28-23 @ 18:56  --------------------------------------------------------  OUT: 325 mL      Escobar Catheter: yes      Physical Examination:  General: NAD, resting comfortably in bed  HEENT: Normocephalic atraumatic  Respiratory: Nonlabored respirations, normal CW expansion.  Cardio: S1S2, regular rate and rhythm.  Abdomen: softly distended, appropriately tender, surgical incisions are c/d/i.  Vascular: extremities are warm and well perfused.     Imaging:  No post-op imaging studies    Assessment:  67yMale patient S/P Laparoscopic low anterior resection. Patient is feeling well. His pain level is appropriate. Denies nausea and vomiting.     Plan:  - Pain control PRN  - Diet: LRD  - IVF  - Escobar  - Activity: OOB/ambulating  - DVT ppx: SCD's & Lovenox    Date/Time: 03-28-23 @ 18:56

## 2023-03-28 NOTE — BRIEF OPERATIVE NOTE - OPERATION/FINDINGS
b/l U Cath placed by urology. Thicken sigmoid, laparoscopic hand assisted sigmoidectomy, specimen 9in, colorectal anastomosis with EEA 29 at 13 cm. with negative leak test. Fascia closed with maxons and skin with interrupted staples and gema. Umbilical port closed with vicryl and staples.

## 2023-03-29 ENCOUNTER — TRANSCRIPTION ENCOUNTER (OUTPATIENT)
Age: 68
End: 2023-03-29

## 2023-03-29 LAB
ANION GAP SERPL CALC-SCNC: 10 MMOL/L — SIGNIFICANT CHANGE UP (ref 5–17)
BUN SERPL-MCNC: 24 MG/DL — HIGH (ref 7–23)
CALCIUM SERPL-MCNC: 8.7 MG/DL — SIGNIFICANT CHANGE UP (ref 8.4–10.5)
CHLORIDE SERPL-SCNC: 99 MMOL/L — SIGNIFICANT CHANGE UP (ref 96–108)
CO2 SERPL-SCNC: 22 MMOL/L — SIGNIFICANT CHANGE UP (ref 22–31)
CREAT SERPL-MCNC: 1.28 MG/DL — SIGNIFICANT CHANGE UP (ref 0.5–1.3)
EGFR: 61 ML/MIN/1.73M2 — SIGNIFICANT CHANGE UP
GLUCOSE SERPL-MCNC: 94 MG/DL — SIGNIFICANT CHANGE UP (ref 70–99)
HCT VFR BLD CALC: 35.9 % — LOW (ref 39–50)
HGB BLD-MCNC: 12.4 G/DL — LOW (ref 13–17)
MAGNESIUM SERPL-MCNC: 2 MG/DL — SIGNIFICANT CHANGE UP (ref 1.6–2.6)
MCHC RBC-ENTMCNC: 30 PG — SIGNIFICANT CHANGE UP (ref 27–34)
MCHC RBC-ENTMCNC: 34.5 GM/DL — SIGNIFICANT CHANGE UP (ref 32–36)
MCV RBC AUTO: 86.7 FL — SIGNIFICANT CHANGE UP (ref 80–100)
NRBC # BLD: 0 /100 WBCS — SIGNIFICANT CHANGE UP (ref 0–0)
PHOSPHATE SERPL-MCNC: 3.5 MG/DL — SIGNIFICANT CHANGE UP (ref 2.5–4.5)
PLATELET # BLD AUTO: 160 K/UL — SIGNIFICANT CHANGE UP (ref 150–400)
POTASSIUM SERPL-MCNC: 4 MMOL/L — SIGNIFICANT CHANGE UP (ref 3.5–5.3)
POTASSIUM SERPL-SCNC: 4 MMOL/L — SIGNIFICANT CHANGE UP (ref 3.5–5.3)
RBC # BLD: 4.14 M/UL — LOW (ref 4.2–5.8)
RBC # FLD: 14 % — SIGNIFICANT CHANGE UP (ref 10.3–14.5)
SODIUM SERPL-SCNC: 131 MMOL/L — LOW (ref 135–145)
WBC # BLD: 8.96 K/UL — SIGNIFICANT CHANGE UP (ref 3.8–10.5)
WBC # FLD AUTO: 8.96 K/UL — SIGNIFICANT CHANGE UP (ref 3.8–10.5)

## 2023-03-29 RX ORDER — DESMOPRESSIN ACETATE 0.1 MG/1
1 TABLET ORAL AT BEDTIME
Refills: 0 | Status: DISCONTINUED | OUTPATIENT
Start: 2023-03-28 | End: 2023-03-29

## 2023-03-29 RX ORDER — DESMOPRESSIN ACETATE 0.1 MG/1
3 TABLET ORAL AT BEDTIME
Refills: 0 | Status: DISCONTINUED | OUTPATIENT
Start: 2023-03-29 | End: 2023-04-02

## 2023-03-29 RX ORDER — MAGNESIUM OXIDE 400 MG ORAL TABLET 241.3 MG
1000 TABLET ORAL
Refills: 0 | Status: DISCONTINUED | OUTPATIENT
Start: 2023-03-29 | End: 2023-04-02

## 2023-03-29 RX ORDER — ACETAMINOPHEN 500 MG
1000 TABLET ORAL EVERY 6 HOURS
Refills: 0 | Status: DISCONTINUED | OUTPATIENT
Start: 2023-03-29 | End: 2023-04-02

## 2023-03-29 RX ORDER — OXYCODONE HYDROCHLORIDE 5 MG/1
5 TABLET ORAL EVERY 4 HOURS
Refills: 0 | Status: DISCONTINUED | OUTPATIENT
Start: 2023-03-29 | End: 2023-04-02

## 2023-03-29 RX ORDER — OXYCODONE HYDROCHLORIDE 5 MG/1
10 TABLET ORAL EVERY 4 HOURS
Refills: 0 | Status: DISCONTINUED | OUTPATIENT
Start: 2023-03-29 | End: 2023-04-02

## 2023-03-29 RX ADMIN — Medication 1000 MILLIGRAM(S): at 17:06

## 2023-03-29 RX ADMIN — DESMOPRESSIN ACETATE 3 SPRAY(S): 0.1 TABLET ORAL at 23:27

## 2023-03-29 RX ADMIN — Medication 400 MILLIGRAM(S): at 05:27

## 2023-03-29 RX ADMIN — Medication 400 MILLIGRAM(S): at 11:46

## 2023-03-29 RX ADMIN — TAMSULOSIN HYDROCHLORIDE 0.4 MILLIGRAM(S): 0.4 CAPSULE ORAL at 23:26

## 2023-03-29 RX ADMIN — CITALOPRAM 20 MILLIGRAM(S): 10 TABLET, FILM COATED ORAL at 05:27

## 2023-03-29 RX ADMIN — MAGNESIUM OXIDE 400 MG ORAL TABLET 1000 MILLIGRAM(S): 241.3 TABLET ORAL at 17:06

## 2023-03-29 RX ADMIN — SODIUM CHLORIDE 40 MILLILITER(S): 9 INJECTION, SOLUTION INTRAVENOUS at 00:21

## 2023-03-29 RX ADMIN — PANTOPRAZOLE SODIUM 40 MILLIGRAM(S): 20 TABLET, DELAYED RELEASE ORAL at 05:27

## 2023-03-29 RX ADMIN — ENOXAPARIN SODIUM 40 MILLIGRAM(S): 100 INJECTION SUBCUTANEOUS at 00:19

## 2023-03-29 RX ADMIN — Medication 400 MILLIGRAM(S): at 00:19

## 2023-03-29 RX ADMIN — Medication 1000 MILLIGRAM(S): at 12:16

## 2023-03-29 RX ADMIN — OXYCODONE HYDROCHLORIDE 10 MILLIGRAM(S): 5 TABLET ORAL at 20:19

## 2023-03-29 RX ADMIN — Medication 1000 MILLIGRAM(S): at 17:36

## 2023-03-29 NOTE — DIETITIAN INITIAL EVALUATION ADULT - NSFNSPHYEXAMSKINFT_GEN_A_CORE
Pt states UBW was ~205 in September 2022, intentional diet changes and wt loss, reports now ~185 pounds; noted dosing wt 186.9 pounds   105% IBW ( pounds)  Skin: no noted pressure injuries as per flowsheets; + surgical incision

## 2023-03-29 NOTE — DISCHARGE NOTE PROVIDER - PROVIDER TOKENS
PROVIDER:[TOKEN:[[id number]],FOLLOWUP:[1 week]],PROVIDER:[TOKEN:[[id number]],FOLLOWUP:[1 week]]

## 2023-03-29 NOTE — PHYSICAL THERAPY INITIAL EVALUATION ADULT - PERTINENT HX OF CURRENT PROBLEM, REHAB EVAL
67 year old male, with a history of recurrent diverticulitis, presents for ERAS/Low anterior resection. Pt. most recent episode of diverticulitis was in Feburary 2023 requiring IV hydration & IV antibiotics. Pt. reports being placed on oral cipro and flagyl by Dr. Berumen over the last few days and reports experiencing nausea, diarrhea and left lower back pain

## 2023-03-29 NOTE — DIETITIAN INITIAL EVALUATION ADULT - NS FNS DIET ORDER
Diet, Low Fiber:   Ensure Surgery Cans or Servings Per Day:  1     Special Instructions for Nursing:  Advance diet to low fiber with 1 ensure surgery if patient tolerates 1 clear liquid tray (03-28-23 @ 16:56)

## 2023-03-29 NOTE — PHYSICAL THERAPY INITIAL EVALUATION ADULT - PLANNED THERAPY INTERVENTIONS, PT EVAL
stairs  GOAL: Pt will negotiate up/down10 steps handrail ascending independently in 1 weeks/bed mobility training/gait training/transfer training

## 2023-03-29 NOTE — DIETITIAN INITIAL EVALUATION ADULT - PERTINENT LABORATORY DATA
03-29    131<L>  |  99  |  24<H>  ----------------------------<  94  4.0   |  22  |  1.28    Ca    8.7      29 Mar 2023 07:00  Phos  3.5     03-29  Mg     2.0     03-29    A1C with Estimated Average Glucose Result: 5.4 % (03-16-23 @ 18:12)    03-29 @ 07:00: Na 131<L>, BUN 24<H>, Cr 1.28, BG 94, K+ 4.0, Phos 3.5, Mg 2.0, Alk Phos --, ALT/SGPT --, AST/SGOT --, HbA1c --

## 2023-03-29 NOTE — DISCHARGE NOTE PROVIDER - CARE PROVIDER_API CALL
Stuart Berumen)  ColonRectal Surgery; Surgery  [address] [facility], [address]  [place] [address]  Phone: [phone]  Fax: [phone]  Follow Up Time: 1 week    Yobany Ng)  Internal Medicine  [address] [place] [address]  Phone: [phone]  Fax: [phone]  Follow Up Time: 1 week

## 2023-03-29 NOTE — DIETITIAN INITIAL EVALUATION ADULT - NSFNSGIASSESSMENTFT_GEN_A_CORE
- Pt denies nausea, vomiting, diarrhea, or constipation at this time. reports had some episodes of nausea PTA/upon admission  - Last BM: 3/28 per pt; not currently ordered for bowel regimen; noted ordered for Mg oxide

## 2023-03-29 NOTE — DIETITIAN INITIAL EVALUATION ADULT - PERTINENT MEDS FT
MEDICATIONS  (STANDING):  acetaminophen     Tablet .. 1000 milliGRAM(s) Oral every 6 hours  cefoTEtan  IVPB 2 Gram(s) IV Intermittent once  citalopram 20 milliGRAM(s) Oral daily  desmopressin 0.01% Nasal 3 Spray(s) Nasal at bedtime  enoxaparin Injectable 40 milliGRAM(s) SubCutaneous every 24 hours  influenza  Vaccine (HIGH DOSE) 0.7 milliLiter(s) IntraMuscular once  lidocaine 1% Injectable 0.2 milliLiter(s) Local Injection once  magnesium oxide 1000 milliGRAM(s) Oral two times a day with meals  pantoprazole    Tablet 40 milliGRAM(s) Oral before breakfast  tamsulosin 0.4 milliGRAM(s) Oral at bedtime    MEDICATIONS  (PRN):  oxyCODONE    IR 5 milliGRAM(s) Oral every 4 hours PRN Moderate Pain (4 - 6)  oxyCODONE    IR 10 milliGRAM(s) Oral every 4 hours PRN Severe Pain (7 - 10)

## 2023-03-29 NOTE — PHYSICAL THERAPY INITIAL EVALUATION ADULT - DID THE PATIENT HAVE SURGERY?
laparoscopic assisted low anterior resection, intraop rigid sigmoidoscopy, Cystoscopy and insertion of bilateral ureteral catheters./yes

## 2023-03-29 NOTE — DIETITIAN INITIAL EVALUATION ADULT - PERSON TAUGHT/METHOD
- RD provided low-fiber nutrition therapy including importance of avoiding  fiber rich foods, fresh fruits/vegetables, whole grains, and added fiber in processed foods. Discussed chewing foods well and adequate hydration and protein intake. Discussed gradual reintroduction of fiber back into diet once cleared by MD. Pt verbalized understanding and accepted written handout. Patient with no nutrition-related questions at this time. Made aware RD remains available as needed./verbal instruction/written material/teach back - (Patient repeats in own words)/patient instructed

## 2023-03-29 NOTE — DISCHARGE NOTE PROVIDER - NSDCMRMEDTOKEN_GEN_ALL_CORE_FT
citalopram 20 mg oral tablet: 1 tab(s) orally once a day  desmopressin nasal 0.01% spray (obsolete): 1 spray(s) nasal once a day (at bedtime)  multivitamin: 1 tab(s) orally once a day  natures bounty probiotic: 1 tab(s) orally once a day  omeprazole 20 mg oral delayed release capsule: 1 cap(s) orally once a day  tamsulosin 0.4 mg oral capsule: 1 cap(s) orally once a day (at bedtime)  vitamin b 12: 1 tab(s) orally once a day  vitamin c: 1 tab(s) orally once a day  Vitamin D3 25 mcg (1000 intl units) oral tablet: 1 tab(s) orally once a day   citalopram 20 mg oral tablet: 1 tab(s) orally once a day  desmopressin nasal 0.01% spray (obsolete): 1 spray(s) nasal once a day (at bedtime)  multivitamin: 1 tab(s) orally once a day  natures bounty probiotic: 1 tab(s) orally once a day  omeprazole 20 mg oral delayed release capsule: 1 cap(s) orally once a day  oxyCODONE 5 mg oral tablet: 1 tab(s) orally every 6 hours as needed for  severe pain only for severe breakthrough pain not controlled by over the counter medication such as tylenol and ibuprofen MDD: 4  oxyCODONE 5 mg oral tablet: 1 tab(s) orally every 6 hours as needed for  severe pain only for severe breakthrough pain not controlled by over the counter medication such as tylenol and ibuprofen MDD: 4  tamsulosin 0.4 mg oral capsule: 1 cap(s) orally once a day (at bedtime)  vitamin b 12: 1 tab(s) orally once a day  vitamin c: 1 tab(s) orally once a day  Vitamin D3 25 mcg (1000 intl units) oral tablet: 1 tab(s) orally once a day

## 2023-03-29 NOTE — PHYSICAL THERAPY INITIAL EVALUATION ADULT - GENERAL OBSERVATIONS, REHAB EVAL
Received seated in bedside chair in NAD, (+) abdominal drain; dressing and incision clean/dry/intact

## 2023-03-29 NOTE — DISCHARGE NOTE PROVIDER - NSDCCPCAREPLAN_GEN_ALL_CORE_FT
PRINCIPAL DISCHARGE DIAGNOSIS  Diagnosis: Diverticulitis  Assessment and Plan of Treatment: WOUND CARE: Please remove any outer dressings in 24 hours. Replace dry dressings as needed.  BATHING: Please do not submerge wound underwater. No swimming pools, no hot tubs, no tub baths. You may shower and/or sponge bathe in 24 hours. Let soapy water run over incisions. DO NOT scrub or soak incision sites. Pat dry.   ACTIVITY: No heavy lifting more than 10-15lbs or straining. Otherwise, you may return to your usual level of physical activity. You may complete your daily activities. Take frequent walks. If you are taking narcotic pain medication (such as Oxycodone), do NOT drive a car, operate machinery or make important decisions.  DIET: Low fiber diet as tolerated  NOTIFY YOUR SURGEON IF: You have any bleeding that does not stop, any pus draining from your wound, any fever (over 100.4 F) or chills, persistent nausea/vomiting with inability to tolerate food or liquids, persistent diarrhea, or if your pain is not controlled on your discharge pain medications.  FOLLOW-UP:  1. Please call to make a follow-up appointment with Dr. Berumen within one week of discharge.  2. Please follow up with your primary care physician in one week regarding your hospitalization.      SECONDARY DISCHARGE DIAGNOSES  Diagnosis: History of diabetes insipidus  Assessment and Plan of Treatment: Continue home medications.

## 2023-03-29 NOTE — DIETITIAN INITIAL EVALUATION ADULT - OTHER INFO
Home Medications:  citalopram 20 mg oral tablet: 1 tab(s) orally once a day (28 Mar 2023 07:33)  desmopressin nasal 0.01% spray (obsolete): 1 spray(s) nasal once a day (at bedtime) (28 Mar 2023 07:33)  multivitamin: 1 tab(s) orally once a day (28 Mar 2023 07:33)  natures bounty probiotic: 1 tab(s) orally once a day (28 Mar 2023 07:33)  omeprazole 20 mg oral delayed release capsule: 1 cap(s) orally once a day (28 Mar 2023 07:33)  tamsulosin 0.4 mg oral capsule: 1 cap(s) orally once a day (at bedtime) (28 Mar 2023 07:33)  vitamin b 12: 1 tab(s) orally once a day (28 Mar 2023 07:33)  vitamin c: 1 tab(s) orally once a day (28 Mar 2023 07:33)  Vitamin D3 25 mcg (1000 intl units) oral tablet: 1 tab(s) orally once a day (28 Mar 2023 07:33)

## 2023-03-29 NOTE — DISCHARGE NOTE PROVIDER - HOSPITAL COURSE
HPI:  67 year old male, with a history of recurrent diverticulitis, presents for ERAS/Low anterior resection. Pt. most recent episode of diverticulitis was in Feburary 2023 requiring IV hydration & IV antibiotics. Pt. reports being placed on oral cipro and flagyl by Dr. Berumen over the last few days and reports experiencing nausea, diarrhea and left lower back pain X 2 days. Pt. reports notifying Dr. Berumen's office via telephone and was instructed to stop cipro and flagyl. Diarrhea with mild resolution but nausea and back pain has not improved.  Pt. reports having COVID-19 in March 2020; PCR scheduled for 3/25/23. (16 Mar 2023 15:11)    Patient presented for scheduled procedure. Underwent lap LAR without complication and was transferred to the PACU. When PACU criteria was met, patient was transferred to the floor for observation and continuation of ERP protocol. Postoperatively, pain well controlled. Escobar removed POD1 with subsequent successful trial of void. PT evaluated patient and determined no skilled PT needs. Dietician consulted for education. Diet advanced as tolerated to low fiber diet. DVT ppx with SCDs and Lovenox administered daily throughout hospital stay. Ambulation and incentive spirometry encouraged. Home meds resumed. Labs trended and lytes repleted prn. Local wound care to incision sites daily. At time of discharge, patient was hemodynamically stable, tolerating LFD and pain was adequately controlled. HPI:  67 year old male, with a history of recurrent diverticulitis, presents for ERAS/Low anterior resection. Pt. most recent episode of diverticulitis was in Feburary 2023 requiring IV hydration & IV antibiotics. Pt. reports being placed on oral cipro and flagyl by Dr. Berumen over the last few days and reports experiencing nausea, diarrhea and left lower back pain X 2 days.     Patient presented for scheduled procedure.  On 3/28 pt underwent lap LAR without complication and was transferred to the PACU. When PACU criteria was met, patient was transferred to the floor for observation and continuation of ERP protocol. Postoperatively, pain well controlled. Escobar removed POD1 with subsequent successful trial of void. PT evaluated patient and determined no skilled PT needs. Dietician consulted for education. Diet advanced as tolerated to low fiber diet. DVT ppx with SCDs and Lovenox administered daily throughout hospital stay. Ambulation and incentive spirometry encouraged. Home meds resumed. Labs trended and lytes repleted prn. Local wound care to incision sites daily. At time of discharge, patient was hemodynamically stable, tolerating LFD and pain was adequately controlled.  Pt's caprini score was calculated and he did not require DVT prophylaxis on discharge.  He will follow-up with Dr. Berumen within 1-2 weeks after discharge.

## 2023-03-29 NOTE — DIETITIAN INITIAL EVALUATION ADULT - CONTINUE CURRENT NUTRITION CARE PLAN
- Continue low fiber diet; diet advancement low fiber-> regular diet as tolerated/ defer to team./yes

## 2023-03-29 NOTE — DIETITIAN INITIAL EVALUATION ADULT - ADD RECOMMEND
- Will continue to monitor PO intake, weight, labs, skin, GI status, diet.   -- monitor bowel movements/ tolerance to diet  - Nutrition education provided to pt with written materials; pt aware RD remains available to review education/ diet as needed.   - Nutrition care plan to remain consistent with pt goals of care  - RD remains available to review diet education and adjust diet recommendations as needed.

## 2023-03-29 NOTE — DIETITIAN INITIAL EVALUATION ADULT - ORAL INTAKE PTA/DIET HISTORY
Pt endorses good appetite and PO intake PTA.   Reports was following no/low fiber diet since October, avoided dairy.    confirmed no known food allergies/ food intolerances

## 2023-03-29 NOTE — PHYSICAL THERAPY INITIAL EVALUATION ADULT - TRANSFER TRAINING, PT EVAL
GOAL: Patient will perform sit to stand transfers independently at rolling walker in 1 week with proper hand placement

## 2023-03-29 NOTE — DIETITIAN INITIAL EVALUATION ADULT - NSICDXPASTMEDICALHX_GEN_ALL_CORE_FT
PAST MEDICAL HISTORY:  Acute diverticulitis     COVID-19     Diabetes insipidus     GERD (gastroesophageal reflux disease)     H/O vertigo     History of BPH     Hyponatremia     Pituitary adenoma

## 2023-03-29 NOTE — PHYSICAL THERAPY INITIAL EVALUATION ADULT - LIVES WITH, PROFILE
in private home with 1 flight of stairs inside and 5 steps to enter the home, was independent at baseline with no use for assistive devices/spouse

## 2023-03-29 NOTE — DISCHARGE NOTE PROVIDER - ATTENDING ATTESTATION STATEMENT
I have personally seen and examined the patient. I have collaborated with and supervised the Detailed exam

## 2023-03-29 NOTE — DISCHARGE NOTE PROVIDER - NSDCCPTREATMENT_GEN_ALL_CORE_FT
PRINCIPAL PROCEDURE  Procedure: Laparoscopic low anterior resection  Findings and Treatment:       SECONDARY PROCEDURE  Procedure: Laparoscopic low anterior resection  Findings and Treatment:

## 2023-03-30 LAB
ANION GAP SERPL CALC-SCNC: 6 MMOL/L — SIGNIFICANT CHANGE UP (ref 5–17)
BILIRUB SERPL-MCNC: 0.4 MG/DL — SIGNIFICANT CHANGE UP (ref 0.2–1.2)
BUN SERPL-MCNC: 26 MG/DL — HIGH (ref 7–23)
CALCIUM SERPL-MCNC: 8.5 MG/DL — SIGNIFICANT CHANGE UP (ref 8.4–10.5)
CHLORIDE SERPL-SCNC: 98 MMOL/L — SIGNIFICANT CHANGE UP (ref 96–108)
CO2 SERPL-SCNC: 24 MMOL/L — SIGNIFICANT CHANGE UP (ref 22–31)
CREAT SERPL-MCNC: 1.06 MG/DL — SIGNIFICANT CHANGE UP (ref 0.5–1.3)
EGFR: 77 ML/MIN/1.73M2 — SIGNIFICANT CHANGE UP
GLUCOSE SERPL-MCNC: 89 MG/DL — SIGNIFICANT CHANGE UP (ref 70–99)
HCT VFR BLD CALC: 33.3 % — LOW (ref 39–50)
HGB BLD-MCNC: 11.4 G/DL — LOW (ref 13–17)
INR BLD: 1.13 RATIO — SIGNIFICANT CHANGE UP (ref 0.88–1.16)
MAGNESIUM SERPL-MCNC: 1.8 MG/DL — SIGNIFICANT CHANGE UP (ref 1.6–2.6)
MCHC RBC-ENTMCNC: 29.9 PG — SIGNIFICANT CHANGE UP (ref 27–34)
MCHC RBC-ENTMCNC: 34.2 GM/DL — SIGNIFICANT CHANGE UP (ref 32–36)
MCV RBC AUTO: 87.4 FL — SIGNIFICANT CHANGE UP (ref 80–100)
MELD SCORE WITH DIALYSIS: 27 POINTS — SIGNIFICANT CHANGE UP
MELD SCORE WITHOUT DIALYSIS: 8 POINTS — SIGNIFICANT CHANGE UP
NRBC # BLD: 0 /100 WBCS — SIGNIFICANT CHANGE UP (ref 0–0)
PHOSPHATE SERPL-MCNC: 1.9 MG/DL — LOW (ref 2.5–4.5)
PLATELET # BLD AUTO: 134 K/UL — LOW (ref 150–400)
POTASSIUM SERPL-MCNC: 4.3 MMOL/L — SIGNIFICANT CHANGE UP (ref 3.5–5.3)
POTASSIUM SERPL-SCNC: 4.3 MMOL/L — SIGNIFICANT CHANGE UP (ref 3.5–5.3)
PROTHROM AB SERPL-ACNC: 13 SEC — SIGNIFICANT CHANGE UP (ref 10.5–13.4)
RBC # BLD: 3.81 M/UL — LOW (ref 4.2–5.8)
RBC # FLD: 13.9 % — SIGNIFICANT CHANGE UP (ref 10.3–14.5)
SODIUM SERPL-SCNC: 128 MMOL/L — LOW (ref 135–145)
WBC # BLD: 6.11 K/UL — SIGNIFICANT CHANGE UP (ref 3.8–10.5)
WBC # FLD AUTO: 6.11 K/UL — SIGNIFICANT CHANGE UP (ref 3.8–10.5)

## 2023-03-30 RX ORDER — OXYCODONE HYDROCHLORIDE 5 MG/1
1 TABLET ORAL
Qty: 10 | Refills: 0
Start: 2023-03-30

## 2023-03-30 RX ORDER — IBUPROFEN 200 MG
400 TABLET ORAL EVERY 8 HOURS
Refills: 0 | Status: DISCONTINUED | OUTPATIENT
Start: 2023-03-30 | End: 2023-03-31

## 2023-03-30 RX ORDER — ONDANSETRON 8 MG/1
4 TABLET, FILM COATED ORAL ONCE
Refills: 0 | Status: COMPLETED | OUTPATIENT
Start: 2023-03-30 | End: 2023-03-30

## 2023-03-30 RX ORDER — MAGNESIUM SULFATE 500 MG/ML
2 VIAL (ML) INJECTION ONCE
Refills: 0 | Status: COMPLETED | OUTPATIENT
Start: 2023-03-30 | End: 2023-03-30

## 2023-03-30 RX ADMIN — ENOXAPARIN SODIUM 40 MILLIGRAM(S): 100 INJECTION SUBCUTANEOUS at 01:09

## 2023-03-30 RX ADMIN — Medication 400 MILLIGRAM(S): at 11:00

## 2023-03-30 RX ADMIN — Medication 400 MILLIGRAM(S): at 22:00

## 2023-03-30 RX ADMIN — Medication 85 MILLIMOLE(S): at 10:43

## 2023-03-30 RX ADMIN — Medication 400 MILLIGRAM(S): at 11:30

## 2023-03-30 RX ADMIN — ENOXAPARIN SODIUM 40 MILLIGRAM(S): 100 INJECTION SUBCUTANEOUS at 22:22

## 2023-03-30 RX ADMIN — Medication 1000 MILLIGRAM(S): at 07:06

## 2023-03-30 RX ADMIN — Medication 1000 MILLIGRAM(S): at 18:09

## 2023-03-30 RX ADMIN — ONDANSETRON 4 MILLIGRAM(S): 8 TABLET, FILM COATED ORAL at 18:14

## 2023-03-30 RX ADMIN — ONDANSETRON 4 MILLIGRAM(S): 8 TABLET, FILM COATED ORAL at 09:23

## 2023-03-30 RX ADMIN — Medication 1000 MILLIGRAM(S): at 22:26

## 2023-03-30 RX ADMIN — Medication 1000 MILLIGRAM(S): at 05:16

## 2023-03-30 RX ADMIN — PANTOPRAZOLE SODIUM 40 MILLIGRAM(S): 20 TABLET, DELAYED RELEASE ORAL at 05:17

## 2023-03-30 RX ADMIN — Medication 1000 MILLIGRAM(S): at 11:52

## 2023-03-30 RX ADMIN — MAGNESIUM OXIDE 400 MG ORAL TABLET 1000 MILLIGRAM(S): 241.3 TABLET ORAL at 17:40

## 2023-03-30 RX ADMIN — CITALOPRAM 20 MILLIGRAM(S): 10 TABLET, FILM COATED ORAL at 05:17

## 2023-03-30 RX ADMIN — OXYCODONE HYDROCHLORIDE 5 MILLIGRAM(S): 5 TABLET ORAL at 08:35

## 2023-03-30 RX ADMIN — OXYCODONE HYDROCHLORIDE 5 MILLIGRAM(S): 5 TABLET ORAL at 09:05

## 2023-03-30 RX ADMIN — DESMOPRESSIN ACETATE 3 SPRAY(S): 0.1 TABLET ORAL at 22:21

## 2023-03-30 RX ADMIN — Medication 1000 MILLIGRAM(S): at 12:22

## 2023-03-30 RX ADMIN — Medication 25 GRAM(S): at 08:36

## 2023-03-30 RX ADMIN — OXYCODONE HYDROCHLORIDE 10 MILLIGRAM(S): 5 TABLET ORAL at 12:59

## 2023-03-30 RX ADMIN — Medication 1000 MILLIGRAM(S): at 17:39

## 2023-03-30 RX ADMIN — OXYCODONE HYDROCHLORIDE 10 MILLIGRAM(S): 5 TABLET ORAL at 13:29

## 2023-03-30 RX ADMIN — Medication 1000 MILLIGRAM(S): at 22:22

## 2023-03-30 RX ADMIN — Medication 400 MILLIGRAM(S): at 20:38

## 2023-03-30 RX ADMIN — TAMSULOSIN HYDROCHLORIDE 0.4 MILLIGRAM(S): 0.4 CAPSULE ORAL at 22:22

## 2023-03-31 LAB
ANION GAP SERPL CALC-SCNC: 8 MMOL/L — SIGNIFICANT CHANGE UP (ref 5–17)
BUN SERPL-MCNC: 22 MG/DL — SIGNIFICANT CHANGE UP (ref 7–23)
CALCIUM SERPL-MCNC: 8.9 MG/DL — SIGNIFICANT CHANGE UP (ref 8.4–10.5)
CHLORIDE SERPL-SCNC: 95 MMOL/L — LOW (ref 96–108)
CO2 SERPL-SCNC: 25 MMOL/L — SIGNIFICANT CHANGE UP (ref 22–31)
CREAT SERPL-MCNC: 1.7 MG/DL — HIGH (ref 0.5–1.3)
EGFR: 44 ML/MIN/1.73M2 — LOW
GLUCOSE SERPL-MCNC: 103 MG/DL — HIGH (ref 70–99)
HCT VFR BLD CALC: 33.8 % — LOW (ref 39–50)
HGB BLD-MCNC: 11.8 G/DL — LOW (ref 13–17)
MAGNESIUM SERPL-MCNC: 2 MG/DL — SIGNIFICANT CHANGE UP (ref 1.6–2.6)
MCHC RBC-ENTMCNC: 29.8 PG — SIGNIFICANT CHANGE UP (ref 27–34)
MCHC RBC-ENTMCNC: 34.9 GM/DL — SIGNIFICANT CHANGE UP (ref 32–36)
MCV RBC AUTO: 85.4 FL — SIGNIFICANT CHANGE UP (ref 80–100)
NRBC # BLD: 0 /100 WBCS — SIGNIFICANT CHANGE UP (ref 0–0)
PHOSPHATE SERPL-MCNC: 3 MG/DL — SIGNIFICANT CHANGE UP (ref 2.5–4.5)
PLATELET # BLD AUTO: 134 K/UL — LOW (ref 150–400)
POTASSIUM SERPL-MCNC: 4.1 MMOL/L — SIGNIFICANT CHANGE UP (ref 3.5–5.3)
POTASSIUM SERPL-SCNC: 4.1 MMOL/L — SIGNIFICANT CHANGE UP (ref 3.5–5.3)
RBC # BLD: 3.96 M/UL — LOW (ref 4.2–5.8)
RBC # FLD: 13.3 % — SIGNIFICANT CHANGE UP (ref 10.3–14.5)
SODIUM SERPL-SCNC: 128 MMOL/L — LOW (ref 135–145)
WBC # BLD: 7.04 K/UL — SIGNIFICANT CHANGE UP (ref 3.8–10.5)
WBC # FLD AUTO: 7.04 K/UL — SIGNIFICANT CHANGE UP (ref 3.8–10.5)

## 2023-03-31 RX ORDER — DEXTROSE MONOHYDRATE, SODIUM CHLORIDE, AND POTASSIUM CHLORIDE 50; .745; 4.5 G/1000ML; G/1000ML; G/1000ML
1000 INJECTION, SOLUTION INTRAVENOUS
Refills: 0 | Status: DISCONTINUED | OUTPATIENT
Start: 2023-03-31 | End: 2023-03-31

## 2023-03-31 RX ORDER — ONDANSETRON 8 MG/1
4 TABLET, FILM COATED ORAL ONCE
Refills: 0 | Status: COMPLETED | OUTPATIENT
Start: 2023-03-31 | End: 2023-03-31

## 2023-03-31 RX ORDER — LANOLIN ALCOHOL/MO/W.PET/CERES
5 CREAM (GRAM) TOPICAL AT BEDTIME
Refills: 0 | Status: DISCONTINUED | OUTPATIENT
Start: 2023-03-31 | End: 2023-04-02

## 2023-03-31 RX ORDER — SODIUM CHLORIDE 9 MG/ML
1000 INJECTION, SOLUTION INTRAVENOUS
Refills: 0 | Status: DISCONTINUED | OUTPATIENT
Start: 2023-03-31 | End: 2023-04-01

## 2023-03-31 RX ADMIN — ENOXAPARIN SODIUM 40 MILLIGRAM(S): 100 INJECTION SUBCUTANEOUS at 23:11

## 2023-03-31 RX ADMIN — TAMSULOSIN HYDROCHLORIDE 0.4 MILLIGRAM(S): 0.4 CAPSULE ORAL at 23:11

## 2023-03-31 RX ADMIN — PANTOPRAZOLE SODIUM 40 MILLIGRAM(S): 20 TABLET, DELAYED RELEASE ORAL at 05:39

## 2023-03-31 RX ADMIN — OXYCODONE HYDROCHLORIDE 10 MILLIGRAM(S): 5 TABLET ORAL at 11:14

## 2023-03-31 RX ADMIN — SODIUM CHLORIDE 50 MILLILITER(S): 9 INJECTION, SOLUTION INTRAVENOUS at 11:30

## 2023-03-31 RX ADMIN — Medication 1000 MILLIGRAM(S): at 23:41

## 2023-03-31 RX ADMIN — Medication 1000 MILLIGRAM(S): at 11:58

## 2023-03-31 RX ADMIN — OXYCODONE HYDROCHLORIDE 10 MILLIGRAM(S): 5 TABLET ORAL at 10:44

## 2023-03-31 RX ADMIN — OXYCODONE HYDROCHLORIDE 10 MILLIGRAM(S): 5 TABLET ORAL at 04:07

## 2023-03-31 RX ADMIN — OXYCODONE HYDROCHLORIDE 10 MILLIGRAM(S): 5 TABLET ORAL at 05:36

## 2023-03-31 RX ADMIN — CITALOPRAM 20 MILLIGRAM(S): 10 TABLET, FILM COATED ORAL at 05:39

## 2023-03-31 RX ADMIN — Medication 1000 MILLIGRAM(S): at 17:53

## 2023-03-31 RX ADMIN — Medication 1000 MILLIGRAM(S): at 11:28

## 2023-03-31 RX ADMIN — Medication 1000 MILLIGRAM(S): at 23:11

## 2023-03-31 RX ADMIN — DESMOPRESSIN ACETATE 3 SPRAY(S): 0.1 TABLET ORAL at 22:49

## 2023-03-31 RX ADMIN — ONDANSETRON 4 MILLIGRAM(S): 8 TABLET, FILM COATED ORAL at 03:32

## 2023-03-31 RX ADMIN — Medication 400 MILLIGRAM(S): at 05:36

## 2023-03-31 RX ADMIN — MAGNESIUM OXIDE 400 MG ORAL TABLET 1000 MILLIGRAM(S): 241.3 TABLET ORAL at 17:23

## 2023-03-31 RX ADMIN — Medication 1000 MILLIGRAM(S): at 17:23

## 2023-03-31 RX ADMIN — Medication 400 MILLIGRAM(S): at 04:04

## 2023-03-31 RX ADMIN — Medication 1000 MILLIGRAM(S): at 05:39

## 2023-03-31 RX ADMIN — Medication 5 MILLIGRAM(S): at 23:28

## 2023-04-01 LAB
ANION GAP SERPL CALC-SCNC: 10 MMOL/L — SIGNIFICANT CHANGE UP (ref 5–17)
BUN SERPL-MCNC: 16 MG/DL — SIGNIFICANT CHANGE UP (ref 7–23)
CALCIUM SERPL-MCNC: 8.6 MG/DL — SIGNIFICANT CHANGE UP (ref 8.4–10.5)
CHLORIDE SERPL-SCNC: 95 MMOL/L — LOW (ref 96–108)
CO2 SERPL-SCNC: 24 MMOL/L — SIGNIFICANT CHANGE UP (ref 22–31)
CREAT SERPL-MCNC: 1.15 MG/DL — SIGNIFICANT CHANGE UP (ref 0.5–1.3)
EGFR: 70 ML/MIN/1.73M2 — SIGNIFICANT CHANGE UP
GLUCOSE SERPL-MCNC: 99 MG/DL — SIGNIFICANT CHANGE UP (ref 70–99)
HCT VFR BLD CALC: 31.4 % — LOW (ref 39–50)
HGB BLD-MCNC: 11 G/DL — LOW (ref 13–17)
MAGNESIUM SERPL-MCNC: 1.8 MG/DL — SIGNIFICANT CHANGE UP (ref 1.6–2.6)
MCHC RBC-ENTMCNC: 30.1 PG — SIGNIFICANT CHANGE UP (ref 27–34)
MCHC RBC-ENTMCNC: 35 GM/DL — SIGNIFICANT CHANGE UP (ref 32–36)
MCV RBC AUTO: 85.8 FL — SIGNIFICANT CHANGE UP (ref 80–100)
NRBC # BLD: 0 /100 WBCS — SIGNIFICANT CHANGE UP (ref 0–0)
PHOSPHATE SERPL-MCNC: 2.5 MG/DL — SIGNIFICANT CHANGE UP (ref 2.5–4.5)
PLATELET # BLD AUTO: 136 K/UL — LOW (ref 150–400)
POTASSIUM SERPL-MCNC: 3.9 MMOL/L — SIGNIFICANT CHANGE UP (ref 3.5–5.3)
POTASSIUM SERPL-SCNC: 3.9 MMOL/L — SIGNIFICANT CHANGE UP (ref 3.5–5.3)
RBC # BLD: 3.66 M/UL — LOW (ref 4.2–5.8)
RBC # FLD: 13.3 % — SIGNIFICANT CHANGE UP (ref 10.3–14.5)
SODIUM SERPL-SCNC: 129 MMOL/L — LOW (ref 135–145)
WBC # BLD: 5.97 K/UL — SIGNIFICANT CHANGE UP (ref 3.8–10.5)
WBC # FLD AUTO: 5.97 K/UL — SIGNIFICANT CHANGE UP (ref 3.8–10.5)

## 2023-04-01 RX ORDER — ONDANSETRON 8 MG/1
4 TABLET, FILM COATED ORAL ONCE
Refills: 0 | Status: COMPLETED | OUTPATIENT
Start: 2023-04-01 | End: 2023-04-01

## 2023-04-01 RX ADMIN — OXYCODONE HYDROCHLORIDE 10 MILLIGRAM(S): 5 TABLET ORAL at 02:31

## 2023-04-01 RX ADMIN — DESMOPRESSIN ACETATE 3 SPRAY(S): 0.1 TABLET ORAL at 18:27

## 2023-04-01 RX ADMIN — OXYCODONE HYDROCHLORIDE 10 MILLIGRAM(S): 5 TABLET ORAL at 22:22

## 2023-04-01 RX ADMIN — MAGNESIUM OXIDE 400 MG ORAL TABLET 1000 MILLIGRAM(S): 241.3 TABLET ORAL at 08:42

## 2023-04-01 RX ADMIN — OXYCODONE HYDROCHLORIDE 10 MILLIGRAM(S): 5 TABLET ORAL at 03:01

## 2023-04-01 RX ADMIN — Medication 1000 MILLIGRAM(S): at 20:32

## 2023-04-01 RX ADMIN — Medication 1000 MILLIGRAM(S): at 22:31

## 2023-04-01 RX ADMIN — Medication 5 MILLIGRAM(S): at 22:32

## 2023-04-01 RX ADMIN — Medication 1000 MILLIGRAM(S): at 18:57

## 2023-04-01 RX ADMIN — OXYCODONE HYDROCHLORIDE 10 MILLIGRAM(S): 5 TABLET ORAL at 23:20

## 2023-04-01 RX ADMIN — TAMSULOSIN HYDROCHLORIDE 0.4 MILLIGRAM(S): 0.4 CAPSULE ORAL at 22:23

## 2023-04-01 RX ADMIN — ONDANSETRON 4 MILLIGRAM(S): 8 TABLET, FILM COATED ORAL at 02:31

## 2023-04-01 RX ADMIN — Medication 1000 MILLIGRAM(S): at 13:31

## 2023-04-01 RX ADMIN — PANTOPRAZOLE SODIUM 40 MILLIGRAM(S): 20 TABLET, DELAYED RELEASE ORAL at 04:45

## 2023-04-01 RX ADMIN — CITALOPRAM 20 MILLIGRAM(S): 10 TABLET, FILM COATED ORAL at 04:45

## 2023-04-01 RX ADMIN — Medication 1000 MILLIGRAM(S): at 05:15

## 2023-04-01 RX ADMIN — Medication 1000 MILLIGRAM(S): at 04:45

## 2023-04-02 ENCOUNTER — TRANSCRIPTION ENCOUNTER (OUTPATIENT)
Age: 68
End: 2023-04-02

## 2023-04-02 VITALS
TEMPERATURE: 99 F | RESPIRATION RATE: 18 BRPM | SYSTOLIC BLOOD PRESSURE: 118 MMHG | DIASTOLIC BLOOD PRESSURE: 71 MMHG | HEART RATE: 70 BPM | OXYGEN SATURATION: 96 %

## 2023-04-02 LAB
ANION GAP SERPL CALC-SCNC: 8 MMOL/L — SIGNIFICANT CHANGE UP (ref 5–17)
APTT BLD: 30.3 SEC — SIGNIFICANT CHANGE UP (ref 27.5–35.5)
BUN SERPL-MCNC: 11 MG/DL — SIGNIFICANT CHANGE UP (ref 7–23)
CALCIUM SERPL-MCNC: 9.1 MG/DL — SIGNIFICANT CHANGE UP (ref 8.4–10.5)
CHLORIDE SERPL-SCNC: 94 MMOL/L — LOW (ref 96–108)
CO2 SERPL-SCNC: 26 MMOL/L — SIGNIFICANT CHANGE UP (ref 22–31)
CREAT SERPL-MCNC: 1.12 MG/DL — SIGNIFICANT CHANGE UP (ref 0.5–1.3)
EGFR: 72 ML/MIN/1.73M2 — SIGNIFICANT CHANGE UP
GLUCOSE SERPL-MCNC: 88 MG/DL — SIGNIFICANT CHANGE UP (ref 70–99)
HCT VFR BLD CALC: 32.9 % — LOW (ref 39–50)
HGB BLD-MCNC: 11.2 G/DL — LOW (ref 13–17)
INR BLD: 1.14 RATIO — SIGNIFICANT CHANGE UP (ref 0.88–1.16)
MAGNESIUM SERPL-MCNC: 1.9 MG/DL — SIGNIFICANT CHANGE UP (ref 1.6–2.6)
MCHC RBC-ENTMCNC: 29.2 PG — SIGNIFICANT CHANGE UP (ref 27–34)
MCHC RBC-ENTMCNC: 34 GM/DL — SIGNIFICANT CHANGE UP (ref 32–36)
MCV RBC AUTO: 85.9 FL — SIGNIFICANT CHANGE UP (ref 80–100)
NRBC # BLD: 0 /100 WBCS — SIGNIFICANT CHANGE UP (ref 0–0)
PHOSPHATE SERPL-MCNC: 2.7 MG/DL — SIGNIFICANT CHANGE UP (ref 2.5–4.5)
PLATELET # BLD AUTO: 165 K/UL — SIGNIFICANT CHANGE UP (ref 150–400)
POTASSIUM SERPL-MCNC: 3.9 MMOL/L — SIGNIFICANT CHANGE UP (ref 3.5–5.3)
POTASSIUM SERPL-SCNC: 3.9 MMOL/L — SIGNIFICANT CHANGE UP (ref 3.5–5.3)
PROTHROM AB SERPL-ACNC: 13.1 SEC — SIGNIFICANT CHANGE UP (ref 10.5–13.4)
RBC # BLD: 3.83 M/UL — LOW (ref 4.2–5.8)
RBC # FLD: 13.2 % — SIGNIFICANT CHANGE UP (ref 10.3–14.5)
SODIUM SERPL-SCNC: 128 MMOL/L — LOW (ref 135–145)
WBC # BLD: 4.08 K/UL — SIGNIFICANT CHANGE UP (ref 3.8–10.5)
WBC # FLD AUTO: 4.08 K/UL — SIGNIFICANT CHANGE UP (ref 3.8–10.5)

## 2023-04-02 PROCEDURE — 84100 ASSAY OF PHOSPHORUS: CPT

## 2023-04-02 PROCEDURE — 80048 BASIC METABOLIC PNL TOTAL CA: CPT

## 2023-04-02 PROCEDURE — 82330 ASSAY OF CALCIUM: CPT

## 2023-04-02 PROCEDURE — 85027 COMPLETE CBC AUTOMATED: CPT

## 2023-04-02 PROCEDURE — 83605 ASSAY OF LACTIC ACID: CPT

## 2023-04-02 PROCEDURE — C1758: CPT

## 2023-04-02 PROCEDURE — 85018 HEMOGLOBIN: CPT

## 2023-04-02 PROCEDURE — 82947 ASSAY GLUCOSE BLOOD QUANT: CPT

## 2023-04-02 PROCEDURE — 88307 TISSUE EXAM BY PATHOLOGIST: CPT

## 2023-04-02 PROCEDURE — 85730 THROMBOPLASTIN TIME PARTIAL: CPT

## 2023-04-02 PROCEDURE — 85610 PROTHROMBIN TIME: CPT

## 2023-04-02 PROCEDURE — 82803 BLOOD GASES ANY COMBINATION: CPT

## 2023-04-02 PROCEDURE — C9399: CPT

## 2023-04-02 PROCEDURE — 83735 ASSAY OF MAGNESIUM: CPT

## 2023-04-02 PROCEDURE — 36415 COLL VENOUS BLD VENIPUNCTURE: CPT

## 2023-04-02 PROCEDURE — 82435 ASSAY OF BLOOD CHLORIDE: CPT

## 2023-04-02 PROCEDURE — 97161 PT EVAL LOW COMPLEX 20 MIN: CPT

## 2023-04-02 PROCEDURE — 82962 GLUCOSE BLOOD TEST: CPT

## 2023-04-02 PROCEDURE — 85014 HEMATOCRIT: CPT

## 2023-04-02 PROCEDURE — C1889: CPT

## 2023-04-02 PROCEDURE — 84295 ASSAY OF SERUM SODIUM: CPT

## 2023-04-02 PROCEDURE — 84132 ASSAY OF SERUM POTASSIUM: CPT

## 2023-04-02 RX ORDER — OXYCODONE HYDROCHLORIDE 5 MG/1
1 TABLET ORAL
Qty: 10 | Refills: 0
Start: 2023-04-02

## 2023-04-02 RX ADMIN — Medication 1000 MILLIGRAM(S): at 12:54

## 2023-04-02 RX ADMIN — PANTOPRAZOLE SODIUM 40 MILLIGRAM(S): 20 TABLET, DELAYED RELEASE ORAL at 05:34

## 2023-04-02 RX ADMIN — CITALOPRAM 20 MILLIGRAM(S): 10 TABLET, FILM COATED ORAL at 05:35

## 2023-04-02 RX ADMIN — OXYCODONE HYDROCHLORIDE 10 MILLIGRAM(S): 5 TABLET ORAL at 07:48

## 2023-04-02 RX ADMIN — OXYCODONE HYDROCHLORIDE 10 MILLIGRAM(S): 5 TABLET ORAL at 12:54

## 2023-04-02 RX ADMIN — ENOXAPARIN SODIUM 40 MILLIGRAM(S): 100 INJECTION SUBCUTANEOUS at 02:10

## 2023-04-02 RX ADMIN — MAGNESIUM OXIDE 400 MG ORAL TABLET 1000 MILLIGRAM(S): 241.3 TABLET ORAL at 09:39

## 2023-04-02 RX ADMIN — OXYCODONE HYDROCHLORIDE 10 MILLIGRAM(S): 5 TABLET ORAL at 16:26

## 2023-04-02 RX ADMIN — OXYCODONE HYDROCHLORIDE 10 MILLIGRAM(S): 5 TABLET ORAL at 12:21

## 2023-04-02 RX ADMIN — OXYCODONE HYDROCHLORIDE 10 MILLIGRAM(S): 5 TABLET ORAL at 08:15

## 2023-04-02 RX ADMIN — Medication 1000 MILLIGRAM(S): at 11:58

## 2023-04-02 NOTE — DISCHARGE NOTE NURSING/CASE MANAGEMENT/SOCIAL WORK - PATIENT PORTAL LINK FT
You can access the FollowMyHealth Patient Portal offered by [facility] by registering at the following website: [email or web address]. By joining [employer]’s FollowMyHealth portal, you will also be able to view your health information using other applications (apps) compatible with our system.

## 2023-04-02 NOTE — DISCHARGE NOTE NURSING/CASE MANAGEMENT/SOCIAL WORK - NSDCPEFALRISK_GEN_ALL_CORE
For information on Fall & Injury Prevention, visit: [email or web address] OR  [email or web address] OR  https://www.cdc.gov/steadi/patient.html

## 2023-04-02 NOTE — PROGRESS NOTE ADULT - PROVIDER SPECIALTY LIST ADULT
Anesthesia
Anesthesia
Colorectal Surgery
Surgery

## 2023-04-02 NOTE — PROGRESS NOTE ADULT - ATTENDING COMMENTS
s/p LAR  -continue clears  -await further GI function  -OOB  -DVT ppx
s/p LAR  -advance diet  -ERP  -dvt ppx  -dispo planning

## 2023-04-02 NOTE — PROGRESS NOTE ADULT - ASSESSMENT
67yMale POD1 S/P Laparoscopic low anterior resection    PLAN:  ERP Protocol  Pain control prn  LRD  D/c Escobar, f/u TOV  OOB/Ambulation, PT eval  Incentive spirometry  DVT ppx with Lovenox   Home meds  AM labs    Red Surgery  p9002
A/P:  67yMale POD4 S/P Laparoscopic low anterior resection    -ERP Protocol  -IV lock  -clears  -OOB/Ambulation  -Incentive spirometry  -DVT ppx with Lovenox   -Home meds  -AM labs    Red Surgery  
A/P:  67yMale POD5 S/P Laparoscopic low anterior resection    -ERP Protocol  -Diet: Reg  -OOB/Ambulation  -Incentive spirometry  -DVT ppx: Lovenox   -DC today      Red Surgery, p9002  
A/P:  67yMale POD2 S/P Laparoscopic low anterior resection    -ERP Protocol  -Pain control prn  -LRD  -OOB/Ambulation  -PT eval- no needs  -Incentive spirometry  -DVT ppx with Lovenox   -Home meds  -AM labs    Red Surgery  p9002  
A/P:  67yMale POD2 S/P Laparoscopic low anterior resection    -ERP Protocol  -clears  -AXR if vomits again  -OOB/Ambulation  -Incentive spirometry  -DVT ppx with Lovenox   -Home meds  - labs    Red Surgery  [id number]

## 2023-04-02 NOTE — PROGRESS NOTE ADULT - SUBJECTIVE AND OBJECTIVE BOX
Day 1 of Anesthesia Pain Management Service    SUBJECTIVE:  Pain Scale Score:          [X] Refer to charted pain scores    THERAPY: Received PF neuraxial morphine as per pain service nurse's note    OBJECTIVE:    Sedation:        	[X] Alert	[ ] Drowsy	[ ] Arousable      [ ] Asleep       [ ] Unresponsive    Side Effects:	[X] None	[ ] Nausea	[ ] Vomiting         [ ] Pruritus  		[ ] Weakness            [ ] Numbness	          [ ] Other:    ASSESSMENT/ PLAN  [X] Patient transitioned to prn analgesics  [X] Pain management per primary service, pain service to sign off   [X]Documentation and Verification of current medications
Day 1 of Anesthesia Pain Management Service    SUBJECTIVE: Doing ok  Pain Scale Score:          [X] Refer to charted pain scores    THERAPY:    s/p    300 mcg PF morphine on 3\28\2023      MEDICATIONS  (STANDING):  acetaminophen   IVPB .. 1000 milliGRAM(s) IV Intermittent every 6 hours  cefoTEtan  IVPB 2 Gram(s) IV Intermittent once  citalopram 20 milliGRAM(s) Oral daily  desmopressin 0.01% Nasal 3 Spray(s) Nasal at bedtime  enoxaparin Injectable 40 milliGRAM(s) SubCutaneous every 24 hours  influenza  Vaccine (HIGH DOSE) 0.7 milliLiter(s) IntraMuscular once  lidocaine 1% Injectable 0.2 milliLiter(s) Local Injection once  morphine PF Spinal 0.3 milliGRAM(s) IntraThecal. once  pantoprazole    Tablet 40 milliGRAM(s) Oral before breakfast  tamsulosin 0.4 milliGRAM(s) Oral at bedtime    MEDICATIONS  (PRN):  diphenhydrAMINE 25 milliGRAM(s) Oral every 4 hours PRN Pruritus  HYDROmorphone  Injectable 0.5 milliGRAM(s) IV Push every 3 hours PRN Severe Pain (7 - 10)  naloxone Injectable 0.1 milliGRAM(s) IV Push every 3 minutes PRN For ANY of the following changes in patient status:  A. RR LESS THAN 10 breaths per minute, B. Oxygen saturation LESS THAN 90%, C. Sedation score of 6  ondansetron Injectable 4 milliGRAM(s) IV Push every 6 hours PRN Nausea  oxyCODONE    IR 5 milliGRAM(s) Oral every 3 hours PRN Mild Pain (1 - 3)  oxyCODONE    IR 10 milliGRAM(s) Oral every 3 hours PRN Moderate Pain (4 - 6)      OBJECTIVE:    Sedation:        	[X] Alert	 [ ] Drowsy	[ ] Arousable      [ ] Asleep       [ ] Unresponsive    Side Effects:	[X] None 	[ ] Nausea	[ ] Vomiting         [ ] Pruritus  		[ ] Weakness            [ ] Numbness	          [ ] Other:    Vital Signs Last 24 Hrs  T(C): 36.9 (29 Mar 2023 05:51), Max: 36.9 (28 Mar 2023 18:19)  T(F): 98.5 (29 Mar 2023 05:51), Max: 98.5 (28 Mar 2023 18:19)  HR: 69 (29 Mar 2023 09:16) (61 - 84)  BP: 117/57 (29 Mar 2023 09:16) (98/54 - 119/74)  BP(mean): 83 (28 Mar 2023 14:45) (71 - 83)  RR: 18 (29 Mar 2023 05:51) (12 - 18)  SpO2: 99% (29 Mar 2023 09:16) (95% - 100%)    Parameters below as of 29 Mar 2023 09:16  Patient On (Oxygen Delivery Method): room air        ASSESSMENT/ PLAN  [X] Patient transitioned to prn analgesics  [X] Pain management per primary service, pain service to sign off   [X]Documentation and Verification of current medications
  INTERVAL EVENTS/SUBJECTIVE: No acute events overnight. Tolerating CLD. +/+    ______________________________________________  OBJECTIVE:   T(C): 36.6 (04-02-23 @ 05:46), Max: 37.5 (04-01-23 @ 16:38)  HR: 51 (04-02-23 @ 05:46) (51 - 66)  BP: 135/75 (04-02-23 @ 05:46) (115/71 - 135/75)  RR: 18 (04-02-23 @ 05:46) (18 - 18)  SpO2: 96% (04-02-23 @ 05:46) (96% - 100%)  Wt(kg): --  CAPILLARY BLOOD GLUCOSE        I&O's Detail    01 Apr 2023 07:01  -  02 Apr 2023 07:00  --------------------------------------------------------  IN:    Oral Fluid: 1240 mL  Total IN: 1240 mL    OUT:    Blood Loss (mL): 0 mL    Emesis (mL): 0 mL    Voided (mL): 2500 mL  Total OUT: 2500 mL    Total NET: -1260 mL          Physical exam:  Gen: resting in bed comfortably in NAD  Resp: no increased WOB, regular inspiratory effort   ABD: Incisions clean, dry and intact. Soft, nondistended. Appropriate incisional tenderness.  ______________________________________________  LABS:  CBC Full  -  ( 02 Apr 2023 07:16 )  WBC Count : 4.08 K/uL  RBC Count : 3.83 M/uL  Hemoglobin : 11.2 g/dL  Hematocrit : 32.9 %  Platelet Count - Automated : 165 K/uL  Mean Cell Volume : 85.9 fl  Mean Cell Hemoglobin : 29.2 pg  Mean Cell Hemoglobin Concentration : 34.0 gm/dL  Auto Neutrophil # : x  Auto Lymphocyte # : x  Auto Monocyte # : x  Auto Eosinophil # : x  Auto Basophil # : x  Auto Neutrophil % : x  Auto Lymphocyte % : x  Auto Monocyte % : x  Auto Eosinophil % : x  Auto Basophil % : x    04-02    128<L>  |  94<L>  |  11  ----------------------------<  88  3.9   |  26  |  1.12    Ca    9.1      02 Apr 2023 07:17  Phos  2.7     04-02  Mg     1.9     04-02      _____________________________________________  RADIOLOGY:    
RED SURGERY DAILY PROGRESS NOTE    SUBJECTIVE:  Patient seen and examined at bedside during morning rounds. No overnight events. Patient feeling well with minimal surgical pain. Denies passing flatus. Escobar in place.     Vital Signs Last 24 Hrs  T(C): 36.9 (29 Mar 2023 05:51), Max: 36.9 (28 Mar 2023 18:19)  T(F): 98.5 (29 Mar 2023 05:51), Max: 98.5 (28 Mar 2023 18:19)  HR: 67 (29 Mar 2023 05:51) (61 - 84)  BP: 115/56 (29 Mar 2023 05:51) (98/54 - 119/74)  BP(mean): 83 (28 Mar 2023 14:45) (71 - 83)  RR: 18 (29 Mar 2023 05:51) (12 - 18)  SpO2: 99% (29 Mar 2023 05:51) (95% - 100%)    Parameters below as of 29 Mar 2023 05:51  Patient On (Oxygen Delivery Method): nasal cannula  O2 Flow (L/min): 2      I&Os    03-28-23 @ 07:01  -  03-29-23 @ 07:00  --------------------------------------------------------  IN: 280 mL / OUT: 975 mL / NET: -695 mL      PHYSICAL EXAM:  GENERAL: NAD, resting comfortably in bed  HEAD: Normocephalic, atraumatic   LUNG: Nonlabored breathing.  ABD: Incisions clean, dry and intact. Soft, nondistended. Appropriate incisional tenderness.  EXT: Warm ,well perfused.  NEURO: Responds appropriately, A&Ox3    MEDICATIONS:  acetaminophen   IVPB .. 1000 milliGRAM(s) IV Intermittent every 6 hours  cefoTEtan  IVPB 2 Gram(s) IV Intermittent once  citalopram 20 milliGRAM(s) Oral daily  desmopressin 0.01% Nasal 3 Spray(s) Nasal at bedtime  diphenhydrAMINE 25 milliGRAM(s) Oral every 4 hours PRN  enoxaparin Injectable 40 milliGRAM(s) SubCutaneous every 24 hours  HYDROmorphone  Injectable 0.5 milliGRAM(s) IV Push every 3 hours PRN  influenza  Vaccine (HIGH DOSE) 0.7 milliLiter(s) IntraMuscular once  lidocaine 1% Injectable 0.2 milliLiter(s) Local Injection once  morphine PF Spinal 0.3 milliGRAM(s) IntraThecal. once  naloxone Injectable 0.1 milliGRAM(s) IV Push every 3 minutes PRN  ondansetron Injectable 4 milliGRAM(s) IV Push every 6 hours PRN  oxyCODONE    IR 5 milliGRAM(s) Oral every 3 hours PRN  oxyCODONE    IR 10 milliGRAM(s) Oral every 3 hours PRN  pantoprazole    Tablet 40 milliGRAM(s) Oral before breakfast  tamsulosin 0.4 milliGRAM(s) Oral at bedtime      LABS:                        12.4   8.96  )-----------( 160      ( 29 Mar 2023 06:58 )             35.9     03-29    131<L>  |  99  |  24<H>  ----------------------------<  94  4.0   |  22  |  1.28    Ca    8.7      29 Mar 2023 07:00  Phos  3.5     03-29  Mg     2.0     03-29  
SURGERY DAILY PROGRESS NOTE:       Subjective / Overnight events:  No acute overnight events.  Escobar removed, voiding appropriately.  Denies N/V.  Tolerating diet.  +flatus, +BM.      Objective:      MEDICATIONS  (STANDING):  acetaminophen     Tablet .. 1000 milliGRAM(s) Oral every 6 hours  cefoTEtan  IVPB 2 Gram(s) IV Intermittent once  citalopram 20 milliGRAM(s) Oral daily  desmopressin 0.01% Nasal 3 Spray(s) Nasal at bedtime  enoxaparin Injectable 40 milliGRAM(s) SubCutaneous every 24 hours  influenza  Vaccine (HIGH DOSE) 0.7 milliLiter(s) IntraMuscular once  lidocaine 1% Injectable 0.2 milliLiter(s) Local Injection once  magnesium oxide 1000 milliGRAM(s) Oral two times a day with meals  pantoprazole    Tablet 40 milliGRAM(s) Oral before breakfast  tamsulosin 0.4 milliGRAM(s) Oral at bedtime    MEDICATIONS  (PRN):  oxyCODONE    IR 5 milliGRAM(s) Oral every 4 hours PRN Moderate Pain (4 - 6)  oxyCODONE    IR 10 milliGRAM(s) Oral every 4 hours PRN Severe Pain (7 - 10)      Vital Signs Last 24 Hrs  T(C): 37.2 (30 Mar 2023 05:53), Max: 37.2 (30 Mar 2023 05:53)  T(F): 98.9 (30 Mar 2023 05:53), Max: 98.9 (30 Mar 2023 05:53)  HR: 63 (30 Mar 2023 05:53) (63 - 73)  BP: 131/71 (30 Mar 2023 05:53) (105/66 - 137/77)  BP(mean): --  RR: 18 (30 Mar 2023 05:53) (18 - 18)  SpO2: 98% (30 Mar 2023 05:53) (97% - 99%)    Parameters below as of 30 Mar 2023 05:53  Patient On (Oxygen Delivery Method): room air        I&O's Detail    29 Mar 2023 07:01  -  30 Mar 2023 07:00  --------------------------------------------------------  IN:    IV PiggyBack: 100 mL    Oral Fluid: 760 mL  Total IN: 860 mL    OUT:    Indwelling Catheter - Urethral (mL): 200 mL    Voided (mL): 1025 mL  Total OUT: 1225 mL    Total NET: -365 mL          Daily     Daily     LABS:                        12.4   8.96  )-----------( 160      ( 29 Mar 2023 06:58 )             35.9     03-29    131<L>  |  99  |  24<H>  ----------------------------<  94  4.0   |  22  |  1.28    Ca    8.7      29 Mar 2023 07:00  Phos  3.5     03-29  Mg     2.0     03-29        PHYSICAL EXAM:  GENERAL: NAD, resting comfortably in bed  HEAD: Normocephalic, atraumatic   LUNG: Nonlabored breathing.  ABD: Incisions clean, dry and intact with gema. Soft, nondistended. Appropriate incisional tenderness.  EXT: Warm ,well perfused.  NEURO: Responds appropriately, A&Ox3  	  
SURGERY DAILY PROGRESS NOTE:       Subjective / Overnight events:  Vomited overnight.  +nausea.  No flatus, no BM.  Pain controlled.      Objective:      MEDICATIONS  (STANDING):  acetaminophen     Tablet .. 1000 milliGRAM(s) Oral every 6 hours  citalopram 20 milliGRAM(s) Oral daily  desmopressin 0.01% Nasal 3 Spray(s) Nasal at bedtime  enoxaparin Injectable 40 milliGRAM(s) SubCutaneous every 24 hours  ibuprofen  Tablet. 400 milliGRAM(s) Oral every 8 hours  influenza  Vaccine (HIGH DOSE) 0.7 milliLiter(s) IntraMuscular once  lidocaine 1% Injectable 0.2 milliLiter(s) Local Injection once  magnesium oxide 1000 milliGRAM(s) Oral two times a day with meals  pantoprazole    Tablet 40 milliGRAM(s) Oral before breakfast  tamsulosin 0.4 milliGRAM(s) Oral at bedtime    MEDICATIONS  (PRN):  oxyCODONE    IR 5 milliGRAM(s) Oral every 4 hours PRN Moderate Pain (4 - 6)  oxyCODONE    IR 10 milliGRAM(s) Oral every 4 hours PRN Severe Pain (7 - 10)      Vital Signs Last 24 Hrs  T(C): 37.1 (31 Mar 2023 06:06), Max: 37.1 (30 Mar 2023 17:45)  T(F): 98.8 (31 Mar 2023 06:06), Max: 98.8 (30 Mar 2023 17:45)  HR: 70 (31 Mar 2023 06:06) (63 - 70)  BP: 110/63 (31 Mar 2023 06:06) (110/63 - 150/80)  BP(mean): --  RR: 18 (31 Mar 2023 06:06) (18 - 18)  SpO2: 98% (31 Mar 2023 06:06) (97% - 100%)    Parameters below as of 31 Mar 2023 06:06  Patient On (Oxygen Delivery Method): room air        I&O's Detail    30 Mar 2023 07:01  -  31 Mar 2023 07:00  --------------------------------------------------------  IN:    IV PiggyBack: 500 mL    IV PiggyBack: 50 mL    Oral Fluid: 1520 mL  Total IN: 2070 mL    OUT:    Emesis (mL): 200 mL    Voided (mL): 1450 mL  Total OUT: 1650 mL    Total NET: 420 mL          Daily     Daily     LABS:                        11.4   6.11  )-----------( 134      ( 30 Mar 2023 07:16 )             33.3     03-30    128<L>  |  98  |  26<H>  ----------------------------<  89  4.3   |  24  |  1.06    Ca    8.5      30 Mar 2023 07:16  Phos  1.9     03-30  Mg     1.8     03-30    TPro  x   /  Alb  x   /  TBili  0.4  /  DBili  x   /  AST  x   /  ALT  x   /  AlkPhos  x   03-30    PT/INR - ( 30 Mar 2023 07:16 )   PT: 13.0 sec;   INR: 1.13 ratio             	  PHYSICAL EXAM:  GENERAL: NAD, resting comfortably in bed  HEAD: Normocephalic, atraumatic   LUNG: Nonlabored breathing.  ABD: Incisions clean, dry and intact. Soft, nondistended. Appropriate incisional tenderness.  EXT: Warm ,well perfused.  NEURO: Responds appropriately, A&Ox3  	  
Subjective: Pt seen and examined at bedside with surgical team.    Vital Signs Last 24 Hrs  T(C): 37.1 (01 Apr 2023 08:40), Max: 37.5 (31 Mar 2023 16:28)  T(F): 98.7 (01 Apr 2023 08:40), Max: 99.5 (31 Mar 2023 16:28)  HR: 66 (01 Apr 2023 08:40) (63 - 96)  BP: 130/74 (01 Apr 2023 08:40) (100/60 - 130/74)  BP(mean): --  RR: 18 (01 Apr 2023 08:40) (18 - 18)  SpO2: 98% (01 Apr 2023 08:40) (96% - 98%)    Parameters below as of 01 Apr 2023 08:40  Patient On (Oxygen Delivery Method): room air        I&O's Detail    31 Mar 2023 07:01  -  01 Apr 2023 07:00  --------------------------------------------------------  IN:    dextrose 5% + sodium chloride 0.9%: 975 mL    Oral Fluid: 240 mL  Total IN: 1215 mL    OUT:    Voided (mL): 1800 mL  Total OUT: 1800 mL    Total NET: -585 mL      01 Apr 2023 07:01  -  01 Apr 2023 10:20  --------------------------------------------------------  IN:    Oral Fluid: 240 mL  Total IN: 240 mL    OUT:    Voided (mL): 150 mL  Total OUT: 150 mL    Total NET: 90 mL      MEDICATIONS  (STANDING):  acetaminophen     Tablet .. 1000 milliGRAM(s) Oral every 6 hours  citalopram 20 milliGRAM(s) Oral daily  desmopressin 0.01% Nasal 3 Spray(s) Nasal at bedtime  enoxaparin Injectable 40 milliGRAM(s) SubCutaneous every 24 hours  influenza  Vaccine (HIGH DOSE) 0.7 milliLiter(s) IntraMuscular once  lidocaine 1% Injectable 0.2 milliLiter(s) Local Injection once  magnesium oxide 1000 milliGRAM(s) Oral two times a day with meals  melatonin 5 milliGRAM(s) Oral at bedtime  pantoprazole    Tablet 40 milliGRAM(s) Oral before breakfast  tamsulosin 0.4 milliGRAM(s) Oral at bedtime    MEDICATIONS  (PRN):  oxyCODONE    IR 5 milliGRAM(s) Oral every 4 hours PRN Moderate Pain (4 - 6)  oxyCODONE    IR 10 milliGRAM(s) Oral every 4 hours PRN Severe Pain (7 - 10)      LABS:                        11.0   5.97  )-----------( 136      ( 01 Apr 2023 07:02 )             31.4     04-01    129<L>  |  95<L>  |  16  ----------------------------<  99  3.9   |  24  |  1.15    Ca    8.6      01 Apr 2023 07:04  Phos  2.5     04-01  Mg     1.8     04-01        PHYSICAL EXAM:  GENERAL: NAD, resting comfortably in bed  HEAD: Normocephalic, atraumatic   LUNG: Nonlabored breathing.  ABD: Incisions clean, dry and intact. Soft, nondistended. Appropriate incisional tenderness.  EXT: Warm ,well perfused.  NEURO: Responds appropriately, A&Ox3        
no events.  +BM, small flatus    abd soft inc intact        MEDICATIONS  (STANDING):  acetaminophen     Tablet .. 1000 milliGRAM(s) Oral every 6 hours  citalopram 20 milliGRAM(s) Oral daily  desmopressin 0.01% Nasal 3 Spray(s) Nasal at bedtime  dextrose 5% + sodium chloride 0.9%. 1000 milliLiter(s) (50 mL/Hr) IV Continuous <Continuous>  enoxaparin Injectable 40 milliGRAM(s) SubCutaneous every 24 hours  influenza  Vaccine (HIGH DOSE) 0.7 milliLiter(s) IntraMuscular once  lidocaine 1% Injectable 0.2 milliLiter(s) Local Injection once  magnesium oxide 1000 milliGRAM(s) Oral two times a day with meals  melatonin 5 milliGRAM(s) Oral at bedtime  pantoprazole    Tablet 40 milliGRAM(s) Oral before breakfast  tamsulosin 0.4 milliGRAM(s) Oral at bedtime    MEDICATIONS  (PRN):  oxyCODONE    IR 5 milliGRAM(s) Oral every 4 hours PRN Moderate Pain (4 - 6)  oxyCODONE    IR 10 milliGRAM(s) Oral every 4 hours PRN Severe Pain (7 - 10)      Vital Signs Last 24 Hrs  T(C): 37.3 (01 Apr 2023 04:50), Max: 37.5 (31 Mar 2023 16:28)  T(F): 99.1 (01 Apr 2023 04:50), Max: 99.5 (31 Mar 2023 16:28)  HR: 68 (01 Apr 2023 04:50) (63 - 96)  BP: 121/66 (01 Apr 2023 04:50) (100/60 - 122/73)  BP(mean): --  RR: 18 (01 Apr 2023 04:50) (18 - 18)  SpO2: 97% (01 Apr 2023 04:50) (96% - 97%)    Parameters below as of 01 Apr 2023 04:50  Patient On (Oxygen Delivery Method): room air        I&O's Detail    31 Mar 2023 07:01  -  01 Apr 2023 07:00  --------------------------------------------------------  IN:    dextrose 5% + sodium chloride 0.9%: 975 mL    Oral Fluid: 240 mL  Total IN: 1215 mL    OUT:    Voided (mL): 1800 mL  Total OUT: 1800 mL    Total NET: -585 mL          Daily     Daily     LABS:                        11.0   5.97  )-----------( 136      ( 01 Apr 2023 07:02 )             31.4     04-01    129<L>  |  95<L>  |  16  ----------------------------<  99  3.9   |  24  |  1.15    Ca    8.6      01 Apr 2023 07:04  Phos  2.5     04-01  Mg     1.8     04-01            RADIOLOGY & ADDITIONAL STUDIES:

## 2023-04-03 ENCOUNTER — NON-APPOINTMENT (OUTPATIENT)
Age: 68
End: 2023-04-03

## 2023-04-04 ENCOUNTER — NON-APPOINTMENT (OUTPATIENT)
Age: 68
End: 2023-04-04

## 2023-04-07 LAB — SURGICAL PATHOLOGY STUDY: SIGNIFICANT CHANGE UP

## 2023-04-12 ENCOUNTER — APPOINTMENT (OUTPATIENT)
Dept: COLORECTAL SURGERY | Facility: CLINIC | Age: 68
End: 2023-04-12
Payer: COMMERCIAL

## 2023-04-12 PROCEDURE — 99024 POSTOP FOLLOW-UP VISIT: CPT

## 2023-04-13 PROBLEM — K21.9 GASTRO-ESOPHAGEAL REFLUX DISEASE WITHOUT ESOPHAGITIS: Chronic | Status: ACTIVE | Noted: 2023-03-16

## 2023-04-13 PROBLEM — U07.1 COVID-19: Chronic | Status: ACTIVE | Noted: 2023-03-16

## 2023-04-13 PROBLEM — K57.92 DIVERTICULITIS OF INTESTINE, PART UNSPECIFIED, WITHOUT PERFORATION OR ABSCESS WITHOUT BLEEDING: Chronic | Status: ACTIVE | Noted: 2023-03-16

## 2023-04-13 PROBLEM — Z87.438 PERSONAL HISTORY OF OTHER DISEASES OF MALE GENITAL ORGANS: Chronic | Status: ACTIVE | Noted: 2023-03-16

## 2023-04-13 PROBLEM — Z87.898 PERSONAL HISTORY OF OTHER SPECIFIED CONDITIONS: Chronic | Status: ACTIVE | Noted: 2023-03-16

## 2023-04-13 PROBLEM — E87.1 HYPO-OSMOLALITY AND HYPONATREMIA: Chronic | Status: ACTIVE | Noted: 2023-03-16

## 2023-04-14 NOTE — HISTORY OF PRESENT ILLNESS
[FreeTextEntry1] : Is an 67-year-old gentleman who presents for his first postoperative visit.\par \par The patient is approximately 2 weeks status post laparoscopic anterior resection for diverticulitis.  Patient had a bit of a postoperative ileus which eventually resolved with conservative therapy.\par \par Currently he is feeling better though his bowel movements are still somewhat erratic.  He denies any significant incisional pain, temperature elevation or rectal bleeding.\par \par Final pathology report is pending but grossly it appeared that this was all diverticular disease.\par \par Physical examination reveals a soft, nontender, nondistended abdomen.  His trocar site and specimen extraction site are fully healed.  Surgical staples were removed.\par \par Patient was reassured that he is making an excellent recuperation.  I want him to drink 4 to 6 glasses of water or juice daily and supplement with a daily dose of Metamucil.\par \par I will see him in 4 weeks for sigmoidoscopy under sedation.

## 2023-05-02 NOTE — REVIEW OF SYSTEMS
[Feeling Poorly] : feeling poorly [Loss Of Hearing] : hearing loss [As Noted in HPI] : as noted in HPI [Negative] : Endocrine [Chest Pain] : no chest pain [Shortness Of Breath] : no shortness of breath [Easy Bleeding] : no tendency for easy bleeding [Easy Bruising] : no tendency for easy bruising [FreeTextEntry4] : bilat hearing aids

## 2023-05-02 NOTE — ASSESSMENT
[FreeTextEntry1] : Very pleasant 67-year-old gentleman who presents today for consultation regarding recurrent diverticulitis.\par \par Patient had his first bout of uncomplicated diverticulitis in September 2022.  He was hospitalized for 1 week and given intravenous antibiotics.  In November 2022 he had a second bout of CAT scan proven diverticulitis again involving the sigmoid colon and was treated with oral antibiotics.  In February 2023 he underwent his initial colonoscopy.  He was found to have diverticulosis and a small benign polyp was removed.  He was hospitalized on February 25 at [facility] for a third bout of recurrent sigmoid diverticulitis which was described as noncomplicated.  He has never had a perforation or an abscess.  He completed his antibiotics approximately 2 days ago and he is already experiencing some recurrent lower abdominal discomfort and has some mild tenderness to touch in the left lower abdomen.\par \par His past medical history includes a pituitary adenoma which required 3 operations.  He has also had a left lateral abdominal wall hernia repaired with mesh as this was the donor site for tissue graft used for one of his pituitary surgeries.  He has developed diabetes insipidus and has an enlarged prostate.\par \par I believe the patient is developing refractory diverticulitis.  The bouts of diverticulitis are becoming more frequent with a shorter time period between them.  He has just completed a course of antibiotics and is now having recurrent pain.\par \par We will prescribe additional antibiotics and change to Cipro and Flagyl.  I will obtain a repeat CAT scan to be sure that he has not developed an abscess.  I believe he needs surgery and hopefully we can do this semi-electively in approximately 4 to 6 weeks.  We discussed the etiology and treatment of diverticulitis and refractory diverticulitis.  We discussed uncomplicated and complicated bouts.  We talked about a laparoscopic assisted approach including anticipated operative time, hospital stay and time to complete recuperation.  Risks, alternatives and benefits including but not limited to anastomotic leak, wound infection and deep venous thrombosis were discussed.\par \par Questions were answered and he wishes to proceed with the plan outlined above.

## 2023-05-02 NOTE — PHYSICAL EXAM
[Normal Breath Sounds] : Normal breath sounds [Normal Heart Sounds] : normal heart sounds [Normal Rate and Rhythm] : normal rate and rhythm [No Edema] : No edema [Alert] : alert [Oriented to Person] : oriented to person [Oriented to Place] : oriented to place [Oriented to Time] : oriented to time [Calm] : calm [de-identified] : round soft +BS lower abdominal tenderness [de-identified] : NC/AT [de-identified] : +ROM [de-identified] : intact

## 2023-05-02 NOTE — HISTORY OF PRESENT ILLNESS
[FreeTextEntry1] : 66yo WM hospitalized x1week  with initial bout of CT proven diverticulitis in Sept/Oct 2022. Discharged on oral ABX [id number].\par \par Nov 2022 CT scan +uncomplicated diverticulitis. Feb 13, 2023 colonoscopy performed  (diverticulosis, polypectomy).\par \par Feb 25, 2023 significant abdominal pain, fever,  N/V. CT scan performed revealing uncomplicated distal SC diverticulosis. Treated with Augmentin [id number] (last dose 2 days ago).\par \par Presently experiencing suprapubic pain. Vomited this AM. Maintaining LRD. Feels that BMs are incomplete. Presents for consultation.

## 2023-05-15 ENCOUNTER — APPOINTMENT (OUTPATIENT)
Dept: COLORECTAL SURGERY | Facility: CLINIC | Age: 68
End: 2023-05-15
Payer: COMMERCIAL

## 2023-05-15 PROCEDURE — 45330 DIAGNOSTIC SIGMOIDOSCOPY: CPT | Mod: 58

## 2023-05-25 NOTE — PROGRESS NOTE ADULT - SUBJECTIVE AND OBJECTIVE BOX
Left message for patient to call back     tele, nsr    REVIEW OF SYSTEMS:  GEN: no fever,    no chills  RESP: no SOB,   no cough  CVS: no chest pain,   no palpitations  GI: no abdominal pain,   no nausea,   no vomiting,   no constipation,   no diarrhea  : no dysuria,   no frequency  NEURO: no headache,   no dizziness  PSYCH: no depression,   not anxious  Derm : no rash    MEDICATIONS  (STANDING):  citalopram 20 milliGRAM(s) Oral daily  desmopressin 0.01% Nasal 1 Spray(s) Nasal daily  heparin  Injectable 5000 Unit(s) SubCutaneous every 12 hours  pantoprazole    Tablet 40 milliGRAM(s) Oral before breakfast    MEDICATIONS  (PRN):      Vital Signs Last 24 Hrs  T(C): 36.4 (2019 04:32), Max: 37 (2019 16:00)  T(F): 97.6 (2019 04:32), Max: 98.6 (2019 16:00)  HR: 60 (2019 04:32) (60 - 71)  BP: 94/58 (2019 04:32) (94/58 - 159/71)  BP(mean): --  RR: 18 (2019 04:32) (16 - 21)  SpO2: 100% (2019 04:32) (96% - 100%)  CAPILLARY BLOOD GLUCOSE        I&O's Summary    2019 07:01  -  2019 07:00  --------------------------------------------------------  IN: 120 mL / OUT: 200 mL / NET: -80 mL        PHYSICAL EXAM:  HEAD:  Atraumatic, Normocephalic  NECK: Supple, No   JVD  CHEST/LUNG:   no     rales,     no,    rhonchi  HEART: Regular rate and rhythm;         murmur  ABDOMEN: Soft, Nontender, ;   EXTREMITIES:    no    edema  NEUROLOGY:  alert    LABS:                        16.8   9.0   )-----------( 187      ( 2019 14:54 )             48.0     06-17    143  |  103  |  19  ----------------------------<  104<H>  4.2   |  25  |  1.12    Ca    10.2      2019 14:54  Phos  2.0       Mg     2.3         TPro  8.1  /  Alb  5.0  /  TBili  0.5  /  DBili  x   /  AST  27  /  ALT  32  /  AlkPhos  69            Urinalysis Basic - ( 2019 14:57 )    Color: Colorless / Appearance: Clear / S.006 / pH: x  Gluc: x / Ketone: Negative  / Bili: Negative / Urobili: Negative   Blood: x / Protein: Negative / Nitrite: Negative   Leuk Esterase: Negative / RBC: x / WBC x   Sq Epi: x / Non Sq Epi: x / Bacteria: x              Thyroid Stimulating Hormone, Serum: 2.36 uIU/mL ( @ 18:14)          Consultant(s) Notes Reviewed:      Care Discussed with Consultants/Other Providers:

## 2023-08-21 ENCOUNTER — APPOINTMENT (OUTPATIENT)
Dept: ENDOCRINOLOGY | Facility: CLINIC | Age: 68
End: 2023-08-21

## 2023-08-28 ENCOUNTER — EMERGENCY (EMERGENCY)
Facility: HOSPITAL | Age: 68
LOS: 1 days | Discharge: HOME | End: 2023-08-28
Attending: EMERGENCY MEDICINE
Payer: MEDICARE

## 2023-08-28 VITALS
WEIGHT: 190.04 LBS | OXYGEN SATURATION: 100 % | RESPIRATION RATE: 18 BRPM | TEMPERATURE: 99 F | SYSTOLIC BLOOD PRESSURE: 119 MMHG | HEART RATE: 62 BPM | DIASTOLIC BLOOD PRESSURE: 71 MMHG

## 2023-08-28 VITALS
HEART RATE: 56 BPM | SYSTOLIC BLOOD PRESSURE: 116 MMHG | DIASTOLIC BLOOD PRESSURE: 65 MMHG | OXYGEN SATURATION: 100 % | RESPIRATION RATE: 16 BRPM

## 2023-08-28 DIAGNOSIS — Z98.890 OTHER SPECIFIED POSTPROCEDURAL STATES: Chronic | ICD-10-CM

## 2023-08-28 DIAGNOSIS — D35.2 BENIGN NEOPLASM OF PITUITARY GLAND: Chronic | ICD-10-CM

## 2023-08-28 LAB
ALBUMIN SERPL ELPH-MCNC: 4.6 G/DL — SIGNIFICANT CHANGE UP (ref 3.3–5)
ALP SERPL-CCNC: 63 U/L — SIGNIFICANT CHANGE UP (ref 40–120)
ALT FLD-CCNC: 37 U/L — SIGNIFICANT CHANGE UP (ref 10–45)
ANION GAP SERPL CALC-SCNC: 13 MMOL/L — SIGNIFICANT CHANGE UP (ref 5–17)
APTT BLD: 36.7 SEC — HIGH (ref 24.5–35.6)
AST SERPL-CCNC: 39 U/L — SIGNIFICANT CHANGE UP (ref 10–40)
BASE EXCESS BLDV CALC-SCNC: 0.8 MMOL/L — SIGNIFICANT CHANGE UP (ref -2–3)
BASOPHILS # BLD AUTO: 0.02 K/UL — SIGNIFICANT CHANGE UP (ref 0–0.2)
BASOPHILS NFR BLD AUTO: 0.4 % — SIGNIFICANT CHANGE UP (ref 0–2)
BILIRUB SERPL-MCNC: 0.5 MG/DL — SIGNIFICANT CHANGE UP (ref 0.2–1.2)
BUN SERPL-MCNC: 14 MG/DL — SIGNIFICANT CHANGE UP (ref 7–23)
CA-I SERPL-SCNC: 1.27 MMOL/L — SIGNIFICANT CHANGE UP (ref 1.15–1.33)
CALCIUM SERPL-MCNC: 9.9 MG/DL — SIGNIFICANT CHANGE UP (ref 8.4–10.5)
CHLORIDE BLDV-SCNC: 100 MMOL/L — SIGNIFICANT CHANGE UP (ref 96–108)
CHLORIDE SERPL-SCNC: 99 MMOL/L — SIGNIFICANT CHANGE UP (ref 96–108)
CO2 BLDV-SCNC: 28 MMOL/L — HIGH (ref 22–26)
CO2 SERPL-SCNC: 22 MMOL/L — SIGNIFICANT CHANGE UP (ref 22–31)
CREAT SERPL-MCNC: 1.21 MG/DL — SIGNIFICANT CHANGE UP (ref 0.5–1.3)
EGFR: 66 ML/MIN/1.73M2 — SIGNIFICANT CHANGE UP
EOSINOPHIL # BLD AUTO: 0.09 K/UL — SIGNIFICANT CHANGE UP (ref 0–0.5)
EOSINOPHIL NFR BLD AUTO: 1.6 % — SIGNIFICANT CHANGE UP (ref 0–6)
GAS PNL BLDV: 132 MMOL/L — LOW (ref 136–145)
GAS PNL BLDV: SIGNIFICANT CHANGE UP
GAS PNL BLDV: SIGNIFICANT CHANGE UP
GLUCOSE BLDV-MCNC: 94 MG/DL — SIGNIFICANT CHANGE UP (ref 70–99)
GLUCOSE SERPL-MCNC: 90 MG/DL — SIGNIFICANT CHANGE UP (ref 70–99)
HCO3 BLDV-SCNC: 27 MMOL/L — SIGNIFICANT CHANGE UP (ref 22–29)
HCT VFR BLD CALC: 43.2 % — SIGNIFICANT CHANGE UP (ref 39–50)
HCT VFR BLDA CALC: 44 % — SIGNIFICANT CHANGE UP (ref 39–51)
HGB BLD CALC-MCNC: 14.5 G/DL — SIGNIFICANT CHANGE UP (ref 12.6–17.4)
HGB BLD-MCNC: 14.8 G/DL — SIGNIFICANT CHANGE UP (ref 13–17)
IMM GRANULOCYTES NFR BLD AUTO: 0.4 % — SIGNIFICANT CHANGE UP (ref 0–0.9)
INR BLD: 1.23 RATIO — HIGH (ref 0.85–1.18)
LACTATE BLDV-MCNC: 2 MMOL/L — SIGNIFICANT CHANGE UP (ref 0.5–2)
LYMPHOCYTES # BLD AUTO: 1.52 K/UL — SIGNIFICANT CHANGE UP (ref 1–3.3)
LYMPHOCYTES # BLD AUTO: 27.8 % — SIGNIFICANT CHANGE UP (ref 13–44)
MCHC RBC-ENTMCNC: 29 PG — SIGNIFICANT CHANGE UP (ref 27–34)
MCHC RBC-ENTMCNC: 34.3 GM/DL — SIGNIFICANT CHANGE UP (ref 32–36)
MCV RBC AUTO: 84.7 FL — SIGNIFICANT CHANGE UP (ref 80–100)
MONOCYTES # BLD AUTO: 0.51 K/UL — SIGNIFICANT CHANGE UP (ref 0–0.9)
MONOCYTES NFR BLD AUTO: 9.3 % — SIGNIFICANT CHANGE UP (ref 2–14)
NEUTROPHILS # BLD AUTO: 3.31 K/UL — SIGNIFICANT CHANGE UP (ref 1.8–7.4)
NEUTROPHILS NFR BLD AUTO: 60.5 % — SIGNIFICANT CHANGE UP (ref 43–77)
NRBC # BLD: 0 /100 WBCS — SIGNIFICANT CHANGE UP (ref 0–0)
NT-PROBNP SERPL-SCNC: 51 PG/ML — SIGNIFICANT CHANGE UP (ref 0–300)
PCO2 BLDV: 46 MMHG — SIGNIFICANT CHANGE UP (ref 42–55)
PH BLDV: 7.37 — SIGNIFICANT CHANGE UP (ref 7.32–7.43)
PLATELET # BLD AUTO: 206 K/UL — SIGNIFICANT CHANGE UP (ref 150–400)
PO2 BLDV: 18 MMHG — LOW (ref 25–45)
POTASSIUM BLDV-SCNC: 4.4 MMOL/L — SIGNIFICANT CHANGE UP (ref 3.5–5.1)
POTASSIUM SERPL-MCNC: 4.4 MMOL/L — SIGNIFICANT CHANGE UP (ref 3.5–5.3)
POTASSIUM SERPL-SCNC: 4.4 MMOL/L — SIGNIFICANT CHANGE UP (ref 3.5–5.3)
PROT SERPL-MCNC: 7.7 G/DL — SIGNIFICANT CHANGE UP (ref 6–8.3)
PROTHROM AB SERPL-ACNC: 12.8 SEC — SIGNIFICANT CHANGE UP (ref 9.5–13)
RBC # BLD: 5.1 M/UL — SIGNIFICANT CHANGE UP (ref 4.2–5.8)
RBC # FLD: 13.2 % — SIGNIFICANT CHANGE UP (ref 10.3–14.5)
SAO2 % BLDV: 22.6 % — LOW (ref 67–88)
SODIUM SERPL-SCNC: 134 MMOL/L — LOW (ref 135–145)
TROPONIN T, HIGH SENSITIVITY RESULT: 10 NG/L — SIGNIFICANT CHANGE UP (ref 0–51)
TROPONIN T, HIGH SENSITIVITY RESULT: 9 NG/L — SIGNIFICANT CHANGE UP (ref 0–51)
WBC # BLD: 5.47 K/UL — SIGNIFICANT CHANGE UP (ref 3.8–10.5)
WBC # FLD AUTO: 5.47 K/UL — SIGNIFICANT CHANGE UP (ref 3.8–10.5)

## 2023-08-28 PROCEDURE — 84295 ASSAY OF SERUM SODIUM: CPT

## 2023-08-28 PROCEDURE — 84484 ASSAY OF TROPONIN QUANT: CPT

## 2023-08-28 PROCEDURE — 99285 EMERGENCY DEPT VISIT HI MDM: CPT

## 2023-08-28 PROCEDURE — 82803 BLOOD GASES ANY COMBINATION: CPT

## 2023-08-28 PROCEDURE — 74177 CT ABD & PELVIS W/CONTRAST: CPT | Mod: MH

## 2023-08-28 PROCEDURE — 82330 ASSAY OF CALCIUM: CPT

## 2023-08-28 PROCEDURE — 85610 PROTHROMBIN TIME: CPT

## 2023-08-28 PROCEDURE — 83605 ASSAY OF LACTIC ACID: CPT

## 2023-08-28 PROCEDURE — 83880 ASSAY OF NATRIURETIC PEPTIDE: CPT

## 2023-08-28 PROCEDURE — 99285 EMERGENCY DEPT VISIT HI MDM: CPT | Mod: 25

## 2023-08-28 PROCEDURE — 80053 COMPREHEN METABOLIC PANEL: CPT

## 2023-08-28 PROCEDURE — 82947 ASSAY GLUCOSE BLOOD QUANT: CPT

## 2023-08-28 PROCEDURE — 82435 ASSAY OF BLOOD CHLORIDE: CPT

## 2023-08-28 PROCEDURE — 74177 CT ABD & PELVIS W/CONTRAST: CPT | Mod: 26,MH

## 2023-08-28 PROCEDURE — 85730 THROMBOPLASTIN TIME PARTIAL: CPT

## 2023-08-28 PROCEDURE — 84132 ASSAY OF SERUM POTASSIUM: CPT

## 2023-08-28 PROCEDURE — 85014 HEMATOCRIT: CPT

## 2023-08-28 PROCEDURE — 85018 HEMOGLOBIN: CPT

## 2023-08-28 PROCEDURE — 70450 CT HEAD/BRAIN W/O DYE: CPT | Mod: 26,MA

## 2023-08-28 PROCEDURE — 93005 ELECTROCARDIOGRAM TRACING: CPT

## 2023-08-28 PROCEDURE — 70450 CT HEAD/BRAIN W/O DYE: CPT | Mod: MA

## 2023-08-28 PROCEDURE — 85025 COMPLETE CBC W/AUTO DIFF WBC: CPT

## 2023-08-28 RX ORDER — METRONIDAZOLE 500 MG
500 TABLET ORAL ONCE
Refills: 0 | Status: COMPLETED | OUTPATIENT
Start: 2023-08-28 | End: 2023-08-28

## 2023-08-28 RX ORDER — SODIUM CHLORIDE 9 MG/ML
1000 INJECTION INTRAMUSCULAR; INTRAVENOUS; SUBCUTANEOUS ONCE
Refills: 0 | Status: COMPLETED | OUTPATIENT
Start: 2023-08-28 | End: 2023-08-28

## 2023-08-28 RX ORDER — DESMOPRESSIN ACETATE 0.1 MG/1
0.2 TABLET ORAL ONCE
Refills: 0 | Status: COMPLETED | OUTPATIENT
Start: 2023-08-28 | End: 2023-08-28

## 2023-08-28 RX ORDER — CIPROFLOXACIN LACTATE 400MG/40ML
500 VIAL (ML) INTRAVENOUS ONCE
Refills: 0 | Status: COMPLETED | OUTPATIENT
Start: 2023-08-28 | End: 2023-08-28

## 2023-08-28 RX ADMIN — SODIUM CHLORIDE 1000 MILLILITER(S): 9 INJECTION INTRAMUSCULAR; INTRAVENOUS; SUBCUTANEOUS at 20:20

## 2023-08-28 RX ADMIN — Medication 500 MILLIGRAM(S): at 20:17

## 2023-08-28 RX ADMIN — DESMOPRESSIN ACETATE 0.2 MILLIGRAM(S): 0.1 TABLET ORAL at 20:17

## 2023-08-28 NOTE — ED ADULT NURSE NOTE - OBJECTIVE STATEMENT
68 y/o male, A&O x3, presents to ED c/o syncope. PMH diverticulitis, DI, and pituitary adenoma. Pt endorses a few days of feeling dizzy and syncopal episodes. Pt endorses frequent falls, unwitnessed, no head trauma or LOC. PT endorses feeling the "same" years ago when his sodium was low. Pt was seen by GI earlier today for loose stool 2x weeks. Pt endorses feeling weak, lethargic, and fatigued. Pt denies SOB, n/v, urinary symptoms, headache, and impaired gait. Pt placed on telebox. Pt family at bedside, bed locked in lowest position.

## 2023-08-28 NOTE — ED ADULT NURSE NOTE - AS PAIN REST
REASON:  Trauma two weeks ago, having persistent pain.

 

FINDINGS:  No acute fracture or destructive osseous lesion.

 

 

Electronically Signed by

Jc Reyes DO 10/12/2019 09:43 A
6 (moderate pain)
Keystone Flap Text: The defect edges were debeveled with a #15 scalpel blade.  Given the location of the defect, shape of the defect a keystone flap was deemed most appropriate.  Using a sterile surgical marker, an appropriate keystone flap was drawn incorporating the defect, outlining the appropriate donor tissue and placing the expected incisions within the relaxed skin tension lines where possible. The area thus outlined was incised deep to adipose tissue with a #15 scalpel blade.  The skin margins were undermined to an appropriate distance in all directions around the primary defect and laterally outward around the flap utilizing iris scissors.

## 2023-08-28 NOTE — ED PROVIDER NOTE - RAPID ASSESSMENT
patient with recurrent diverticulitis s/p resection with Dr. 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 he reports 2 weeks of loose stools went to an ED in a different state with negative CTAP. followed up with his GI earlier today who recommended evaluation in the ED and CTAP with oral and IV. patient reporting feeling similar in the past when his sodium is low. patient with recurrent diverticulitis s/p resection with Dr. 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 he reports 2 weeks of loose stools went to an ED in a different state with negative CTAP. followed up with his GI earlier today who recommended evaluation in the ED and CTAP with oral and IV. patient reporting feeling similar in the past when his sodium is low.    Pt seen as Q-Pa/Triage note by Physician's Assistant. MD immediately available for consultation. Full evaulation to be performed once pt is transferred to Main ED.  Tevin Yang MD, Facep patient with recurrent diverticulitis s/p resection with Dr. 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 he reports 2 weeks of loose stools went to an ED in a different state with negative CTAP. followed up with his GI earlier today who recommended evaluation in the ED and CTAP with oral and IV. patient reporting feeling similar in the past when his sodium is low.    Pt seen as Q-Pa/Triage note by Physician's Assistant. MD immediately available for consultation. Full evaluation to be performed once pt is transferred to Main ED.  Tevin Yang MD, Facep

## 2023-08-28 NOTE — ED ADULT NURSE NOTE - NSFALLRISKINTERV_ED_ALL_ED

## 2023-08-28 NOTE — ED PROVIDER NOTE - NSFOLLOWUPINSTRUCTIONS_ED_ALL_ED_FT
- Your testing/exams was/were reassuring that dangerous emergencies/conditions are less likely to be occurring or to have occurred.    - Take all medications, if given/sent to pharmacy, and as, directed.    - If you had labs or imaging done, you were given copies of all labs and/or imaging results from your er visit--please take them with you to your follow up appointments.  - If needed, call patient access services at [phone] to find a primary care physician (PCP). Call this number to follow up with a specialty service, such as the spine clinic. If you need this, call and say you were recently in the emergency department and you are calling, per my orders.   - Make sure you do not require a primary care physician's referral if you make a specialty clinic appointment directly. Some insurance requires you to see your PCP, get a referral, then make a specialty appointment.

## 2023-08-28 NOTE — ED PROVIDER NOTE - CLINICAL SUMMARY MEDICAL DECISION MAKING FREE TEXT BOX
MDM/Summary/DDx (includes but is not limited to): 67-year-old male with a history of diverticulosis diverticulitis status post partial colectomy, hypothyroid, diabetes insipidus in the setting of multiple surgeries for pituitary adenoma,  with overall fatigue, he believes it to be a sodium, in the setting of 1 week of intermittent loose diarrhea  while on vacation.  EKG was  reviewed and is bradycardic to 50, similar in nature to previous EKGs.  Could be hypovolemia versus electrolyte abnormality.  Could be symptomatic bradycardia causing the weakness of the less likely.  Patient started on Cipro and Flagyl per their GI, and sent in from their GI to rule out colitis.  Patient had an unwitnessed fall as well, patient is not in a c-collar and does not require any cervical imaging.  There is concern for given history and physical intercerebral hemorrhage versus cerebral edema given his history of hyponatremia.  Labs: cbc cmp mag phos bnp   Imaging: CT AP, CTH  Tx: desmopressin, is pt's. Supportive, pain/nausea medications as pt requires/requests. likely fluid restrict given HPI and PE    Consults/Resources: none   Dispo: admit vs CDU vs dc, pending labs imaging     Triage note reviewed. VS reviewed. EKG reviewed and documented in "RESULTS" section, if possible at given time.     DDx in MDM includes the most likely ddx, but is not limited to solely what is listed. Clinical course may alter/deviate from the above plan. When possible, progress notes written, as needed, and are included in "PROGRESS NOTE" section below.       Medical, family, and social determinants of health reviewed and discussed w/ pt/family/caretaker, when allowable, and is incorporated into note above, whenever possible. MDM/Summary/DDx (includes but is not limited to): 67-year-old male with a history of diverticulosis diverticulitis status post partial colectomy, hypothyroid, diabetes insipidus in the setting of multiple surgeries for pituitary adenoma,  with overall fatigue, he believes it to be a sodium, in the setting of 1 week of intermittent loose diarrhea  while on vacation.  EKG was  reviewed and is bradycardic to 50, similar in nature to previous EKGs.  Could be hypovolemia versus electrolyte abnormality.  Could be symptomatic bradycardia causing the weakness of the less likely.  Patient started on Cipro and Flagyl per their GI, and sent in from their GI to rule out colitis.  Patient had an unwitnessed fall as well, patient is not in a c-collar and does not require any cervical imaging.  There is concern for given history and physical intercerebral hemorrhage versus cerebral edema given his history of hyponatremia.  Labs: cbc cmp mag phos bnp   Imaging: CT AP, CTH  Tx: desmopressin, is pt's. Supportive, pain/nausea medications as pt requires/requests. likely fluid restrict given HPI and PE    Consults/Resources: none   Dispo: admit vs CDU vs dc, pending labs imaging     Triage note reviewed. VS reviewed. EKG reviewed and documented in "RESULTS" section, if possible at given time.   [place]: 67 year old male with recently diagnosed colitis, on abx, 1 week of intermittent loose stool while on vacation.  feeling genralized weakness. advised to come to ER to get labs, electolytes evaluation.  Patinet also with unwitnessed fall. will get labs, electrolytes, ct head, ct abdomen/pelvis. reassess. r/o complicated colitis, r/o ich, r/o hyponeatremia. reassess    DDx in MDM includes the most likely ddx, but is not limited to solely what is listed. Clinical course may alter/deviate from the above plan. When possible, progress notes written, as needed, and are included in "PROGRESS NOTE" section below.       Medical, family, and social determinants of health reviewed and discussed w/ pt/family/caretaker, when allowable, and is incorporated into note above, whenever possible.

## 2023-08-28 NOTE — ED PROVIDER NOTE - PATIENT PORTAL LINK FT
You can access the FollowMyHealth Patient Portal offered by [facility] by registering at the following website: [email or web address]. By joining [facility]’s FollowMyHealth portal, you will also be able to view your health information using other applications (apps) compatible with our system.

## 2023-08-28 NOTE — ED PROVIDER NOTE - PHYSICAL EXAMINATION
Exam as stated below:   CONSTITUTIONAL: In NAD.   SKIN: Warm dry. No rashes noted.   HEAD: NCAT.   EYES: No scleral icterus. Conjunctiva pink.  ENT: Posterior pharynx with no erythema.  NECK: No ttp.    CARD: RRR. No murmurs.  RESP: Clear to ausculation b/l. No Crackles noted. No Wheezing noted.  ABD: Soft. Non-tender. Not distended.   MSK: No pedal edema. No calf tenderness.  NEURO: UE/LE grossly intact. Motor UE/LE sensation grossly intact. CN II-XII grossly intact.   PSYCH: Cooperative, appropriate.

## 2023-08-28 NOTE — ED PROVIDER NOTE - OBJECTIVE STATEMENT
HPI & ROS:   67-year-old male with a history of pituitary adenoma, diabetes insipidus, resection of colon partially for diverticulitis in the past, multiple EGDs, family history of Crohn's disease and ulcerative colitis presenting with weakness for 1 week.  Patient was on vacation, then take started experiencing copious amounts of nonbloody diarrhea culminating in a fall unwitnessed at home few days ago.  Since then no neurologic deficits such as weakness or tingling in upper or lower extremities, no neck pain.  Patient was sent in at the behest of his GI to rule out intracranial hemorrhage and to rule in or rule out colitis to figure out targeted antibiotic response.  Patient was just started 2 days ago on Cipro and Flagyl by this GI doctor.

## 2023-08-28 NOTE — ED PROVIDER NOTE - PROGRESS NOTE DETAILS
LUDWIG Sebastian PGY-2: Patient is going to be discharged in the care of his wife.  Troponins negative.

## 2023-09-27 ENCOUNTER — APPOINTMENT (OUTPATIENT)
Dept: ENDOCRINOLOGY | Facility: CLINIC | Age: 68
End: 2023-09-27
Payer: MEDICARE

## 2023-09-27 VITALS
TEMPERATURE: 98 F | DIASTOLIC BLOOD PRESSURE: 70 MMHG | WEIGHT: 192 LBS | OXYGEN SATURATION: 98 % | RESPIRATION RATE: 12 BRPM | BODY MASS INDEX: 26.01 KG/M2 | SYSTOLIC BLOOD PRESSURE: 104 MMHG | HEART RATE: 60 BPM | HEIGHT: 72 IN

## 2023-09-27 DIAGNOSIS — E05.90 THYROTOXICOSIS, UNSPECIFIED W/OUT THYROTOXIC CRISIS OR STORM: ICD-10-CM

## 2023-09-27 LAB
BASOPHILS # BLD AUTO: 0.02 K/UL
BASOPHILS NFR BLD AUTO: 0.4 %
EOSINOPHIL # BLD AUTO: 0.06 K/UL
EOSINOPHIL NFR BLD AUTO: 1.3 %
ESTIMATED AVERAGE GLUCOSE: 114 MG/DL
HBA1C MFR BLD HPLC: 5.6 %
HCT VFR BLD CALC: 40.3 %
HGB BLD-MCNC: 13.8 G/DL
IMM GRANULOCYTES NFR BLD AUTO: 0.2 %
LYMPHOCYTES # BLD AUTO: 1.33 K/UL
LYMPHOCYTES NFR BLD AUTO: 28.9 %
MAN DIFF?: NORMAL
MCHC RBC-ENTMCNC: 29.2 PG
MCHC RBC-ENTMCNC: 34.2 GM/DL
MCV RBC AUTO: 85.4 FL
MONOCYTES # BLD AUTO: 0.51 K/UL
MONOCYTES NFR BLD AUTO: 11.1 %
NEUTROPHILS # BLD AUTO: 2.68 K/UL
NEUTROPHILS NFR BLD AUTO: 58.1 %
PLATELET # BLD AUTO: 176 K/UL
RBC # BLD: 4.72 M/UL
RBC # FLD: 14.8 %
WBC # FLD AUTO: 4.61 K/UL

## 2023-09-27 PROCEDURE — 99204 OFFICE O/P NEW MOD 45 MIN: CPT

## 2023-09-27 RX ORDER — NORMAL SALT TABLETS 1 G/G
1 TABLET ORAL DAILY
Qty: 30 | Refills: 5 | Status: ACTIVE | COMMUNITY
Start: 2023-09-27 | End: 1900-01-01

## 2023-09-30 LAB
ALBUMIN SERPL ELPH-MCNC: 4.7 G/DL
ALP BLD-CCNC: 64 U/L
ALT SERPL-CCNC: 31 U/L
ANION GAP SERPL CALC-SCNC: 14 MMOL/L
AST SERPL-CCNC: 36 U/L
BILIRUB SERPL-MCNC: 0.4 MG/DL
BUN SERPL-MCNC: 24 MG/DL
CALCIUM SERPL-MCNC: 9.5 MG/DL
CHLORIDE SERPL-SCNC: 102 MMOL/L
CHOLEST SERPL-MCNC: 232 MG/DL
CO2 SERPL-SCNC: 22 MMOL/L
CORTIS SERPL-MCNC: 6.1 UG/DL
CREAT SERPL-MCNC: 1.1 MG/DL
EGFR: 74 ML/MIN/1.73M2
FSH SERPL-MCNC: 5 IU/L
GLUCOSE SERPL-MCNC: 91 MG/DL
HDLC SERPL-MCNC: 63 MG/DL
LDLC SERPL CALC-MCNC: 148 MG/DL
LH SERPL-ACNC: 4.6 IU/L
NONHDLC SERPL-MCNC: 169 MG/DL
POTASSIUM SERPL-SCNC: 5.1 MMOL/L
PROLACTIN SERPL-MCNC: 8.6 NG/ML
PROT SERPL-MCNC: 7.4 G/DL
SHBG SERPL-SCNC: 56.9 NMOL/L
SODIUM SERPL-SCNC: 137 MMOL/L
T4 FREE SERPL-MCNC: 0.7 NG/DL
TESTOST SERPL-MCNC: 524 NG/DL
TRIGL SERPL-MCNC: 118 MG/DL
TSH SERPL-ACNC: 2.83 UIU/ML

## 2023-10-04 LAB
T3 SERPL-MCNC: 98 NG/DL
T4 FREE SERPL-MCNC: 0.8 NG/DL

## 2023-10-09 LAB
MONOMERIC PROLACTIN (ICMA)*: 8.24 NG/ML
PERCENT MACROPROLACTIN: 18 %
PROLACTIN, SERUM (ICMA)*: 10.1 NG/ML

## 2023-10-11 LAB — T4 FREE SERPL DIALY-MCNC: 0.62 NG/DL

## 2023-10-11 RX ORDER — LEVOTHYROXINE SODIUM 0.07 MG/1
75 TABLET ORAL DAILY
Qty: 90 | Refills: 3 | Status: ACTIVE | COMMUNITY
Start: 2023-10-11 | End: 1900-01-01

## 2023-10-21 ENCOUNTER — NON-APPOINTMENT (OUTPATIENT)
Age: 68
End: 2023-10-21

## 2023-10-26 DIAGNOSIS — R79.89 OTHER SPECIFIED ABNORMAL FINDINGS OF BLOOD CHEMISTRY: ICD-10-CM

## 2023-10-27 ENCOUNTER — NON-APPOINTMENT (OUTPATIENT)
Age: 68
End: 2023-10-27

## 2023-10-27 ENCOUNTER — INPATIENT (INPATIENT)
Facility: HOSPITAL | Age: 68
LOS: 2 days | Discharge: HOME | DRG: 917 | End: 2023-10-30
Attending: GENERAL ACUTE CARE HOSPITAL | Admitting: INTERNAL MEDICINE
Payer: MEDICARE

## 2023-10-27 VITALS
WEIGHT: 195.11 LBS | HEIGHT: 72 IN | HEART RATE: 60 BPM | SYSTOLIC BLOOD PRESSURE: 125 MMHG | DIASTOLIC BLOOD PRESSURE: 71 MMHG | OXYGEN SATURATION: 100 % | RESPIRATION RATE: 20 BRPM | TEMPERATURE: 99 F

## 2023-10-27 DIAGNOSIS — Z86.018 PERSONAL HISTORY OF OTHER BENIGN NEOPLASM: ICD-10-CM

## 2023-10-27 DIAGNOSIS — E03.8 OTHER SPECIFIED HYPOTHYROIDISM: ICD-10-CM

## 2023-10-27 DIAGNOSIS — E89.89 OTHER POSTPROCEDURAL ENDOCRINE AND METABOLIC COMPLICATIONS AND DISORDERS: ICD-10-CM

## 2023-10-27 DIAGNOSIS — D35.2 BENIGN NEOPLASM OF PITUITARY GLAND: Chronic | ICD-10-CM

## 2023-10-27 DIAGNOSIS — E87.1 HYPO-OSMOLALITY AND HYPONATREMIA: ICD-10-CM

## 2023-10-27 DIAGNOSIS — Z98.890 OTHER SPECIFIED POSTPROCEDURAL STATES: Chronic | ICD-10-CM

## 2023-10-27 LAB
ALBUMIN SERPL ELPH-MCNC: 4.1 G/DL — SIGNIFICANT CHANGE UP (ref 3.3–5)
ALBUMIN SERPL ELPH-MCNC: 4.1 G/DL — SIGNIFICANT CHANGE UP (ref 3.3–5)
ALBUMIN SERPL ELPH-MCNC: 4.4 G/DL
ALP BLD-CCNC: 62 U/L
ALP SERPL-CCNC: 60 U/L — SIGNIFICANT CHANGE UP (ref 40–120)
ALP SERPL-CCNC: 60 U/L — SIGNIFICANT CHANGE UP (ref 40–120)
ALT FLD-CCNC: 22 U/L — SIGNIFICANT CHANGE UP (ref 10–45)
ALT FLD-CCNC: 22 U/L — SIGNIFICANT CHANGE UP (ref 10–45)
ALT SERPL-CCNC: 23 U/L
ANION GAP SERPL CALC-SCNC: 10 MMOL/L — SIGNIFICANT CHANGE UP (ref 5–17)
ANION GAP SERPL CALC-SCNC: 10 MMOL/L — SIGNIFICANT CHANGE UP (ref 5–17)
ANION GAP SERPL CALC-SCNC: 11 MMOL/L
APPEARANCE UR: CLEAR — SIGNIFICANT CHANGE UP
APPEARANCE UR: CLEAR — SIGNIFICANT CHANGE UP
AST SERPL-CCNC: 24 U/L — SIGNIFICANT CHANGE UP (ref 10–40)
AST SERPL-CCNC: 24 U/L — SIGNIFICANT CHANGE UP (ref 10–40)
AST SERPL-CCNC: 30 U/L
BACTERIA # UR AUTO: NEGATIVE — SIGNIFICANT CHANGE UP
BACTERIA # UR AUTO: NEGATIVE — SIGNIFICANT CHANGE UP
BASOPHILS # BLD AUTO: 0.01 K/UL — SIGNIFICANT CHANGE UP (ref 0–0.2)
BASOPHILS # BLD AUTO: 0.01 K/UL — SIGNIFICANT CHANGE UP (ref 0–0.2)
BASOPHILS NFR BLD AUTO: 0.2 % — SIGNIFICANT CHANGE UP (ref 0–2)
BASOPHILS NFR BLD AUTO: 0.2 % — SIGNIFICANT CHANGE UP (ref 0–2)
BILIRUB SERPL-MCNC: 0.4 MG/DL
BILIRUB SERPL-MCNC: 0.5 MG/DL — SIGNIFICANT CHANGE UP (ref 0.2–1.2)
BILIRUB SERPL-MCNC: 0.5 MG/DL — SIGNIFICANT CHANGE UP (ref 0.2–1.2)
BILIRUB UR-MCNC: NEGATIVE — SIGNIFICANT CHANGE UP
BILIRUB UR-MCNC: NEGATIVE — SIGNIFICANT CHANGE UP
BUN SERPL-MCNC: 19 MG/DL
BUN SERPL-MCNC: 21 MG/DL — SIGNIFICANT CHANGE UP (ref 7–23)
BUN SERPL-MCNC: 21 MG/DL — SIGNIFICANT CHANGE UP (ref 7–23)
CALCIUM SERPL-MCNC: 8.9 MG/DL — SIGNIFICANT CHANGE UP (ref 8.4–10.5)
CALCIUM SERPL-MCNC: 8.9 MG/DL — SIGNIFICANT CHANGE UP (ref 8.4–10.5)
CALCIUM SERPL-MCNC: 9.2 MG/DL
CHLORIDE SERPL-SCNC: 87 MMOL/L
CHLORIDE SERPL-SCNC: 90 MMOL/L — LOW (ref 96–108)
CHLORIDE SERPL-SCNC: 90 MMOL/L — LOW (ref 96–108)
CK MB CFR SERPL CALC: 4.7 NG/ML — SIGNIFICANT CHANGE UP (ref 0–6.7)
CK MB CFR SERPL CALC: 4.7 NG/ML — SIGNIFICANT CHANGE UP (ref 0–6.7)
CO2 SERPL-SCNC: 21 MMOL/L — LOW (ref 22–31)
CO2 SERPL-SCNC: 21 MMOL/L — LOW (ref 22–31)
CO2 SERPL-SCNC: 24 MMOL/L
COLOR SPEC: YELLOW — SIGNIFICANT CHANGE UP
COLOR SPEC: YELLOW — SIGNIFICANT CHANGE UP
CORTIS AM PEAK SERPL-MCNC: 10 UG/DL — SIGNIFICANT CHANGE UP (ref 6–18.4)
CORTIS AM PEAK SERPL-MCNC: 10 UG/DL — SIGNIFICANT CHANGE UP (ref 6–18.4)
CREAT SERPL-MCNC: 0.9 MG/DL — SIGNIFICANT CHANGE UP (ref 0.5–1.3)
CREAT SERPL-MCNC: 0.9 MG/DL — SIGNIFICANT CHANGE UP (ref 0.5–1.3)
CREAT SERPL-MCNC: 1.02 MG/DL
D DIMER BLD IA.RAPID-MCNC: 210 NG/ML DDU — SIGNIFICANT CHANGE UP
D DIMER BLD IA.RAPID-MCNC: 210 NG/ML DDU — SIGNIFICANT CHANGE UP
DIFF PNL FLD: NEGATIVE — SIGNIFICANT CHANGE UP
DIFF PNL FLD: NEGATIVE — SIGNIFICANT CHANGE UP
EGFR: 81 ML/MIN/1.73M2
EGFR: 94 ML/MIN/1.73M2 — SIGNIFICANT CHANGE UP
EGFR: 94 ML/MIN/1.73M2 — SIGNIFICANT CHANGE UP
EOSINOPHIL # BLD AUTO: 0.05 K/UL — SIGNIFICANT CHANGE UP (ref 0–0.5)
EOSINOPHIL # BLD AUTO: 0.05 K/UL — SIGNIFICANT CHANGE UP (ref 0–0.5)
EOSINOPHIL NFR BLD AUTO: 1.2 % — SIGNIFICANT CHANGE UP (ref 0–6)
EOSINOPHIL NFR BLD AUTO: 1.2 % — SIGNIFICANT CHANGE UP (ref 0–6)
EPI CELLS # UR: 1 /HPF — SIGNIFICANT CHANGE UP
EPI CELLS # UR: 1 /HPF — SIGNIFICANT CHANGE UP
FLUAV AG NPH QL: SIGNIFICANT CHANGE UP
FLUAV AG NPH QL: SIGNIFICANT CHANGE UP
FLUBV AG NPH QL: SIGNIFICANT CHANGE UP
FLUBV AG NPH QL: SIGNIFICANT CHANGE UP
GLUCOSE SERPL-MCNC: 89 MG/DL
GLUCOSE SERPL-MCNC: 94 MG/DL — SIGNIFICANT CHANGE UP (ref 70–99)
GLUCOSE SERPL-MCNC: 94 MG/DL — SIGNIFICANT CHANGE UP (ref 70–99)
GLUCOSE UR QL: NEGATIVE — SIGNIFICANT CHANGE UP
GLUCOSE UR QL: NEGATIVE — SIGNIFICANT CHANGE UP
HCT VFR BLD CALC: 36.6 % — LOW (ref 39–50)
HCT VFR BLD CALC: 36.6 % — LOW (ref 39–50)
HGB BLD-MCNC: 13.1 G/DL — SIGNIFICANT CHANGE UP (ref 13–17)
HGB BLD-MCNC: 13.1 G/DL — SIGNIFICANT CHANGE UP (ref 13–17)
HYALINE CASTS # UR AUTO: 2 /LPF — SIGNIFICANT CHANGE UP (ref 0–2)
HYALINE CASTS # UR AUTO: 2 /LPF — SIGNIFICANT CHANGE UP (ref 0–2)
IMM GRANULOCYTES NFR BLD AUTO: 0.2 % — SIGNIFICANT CHANGE UP (ref 0–0.9)
IMM GRANULOCYTES NFR BLD AUTO: 0.2 % — SIGNIFICANT CHANGE UP (ref 0–0.9)
KETONES UR-MCNC: NEGATIVE — SIGNIFICANT CHANGE UP
KETONES UR-MCNC: NEGATIVE — SIGNIFICANT CHANGE UP
LEUKOCYTE ESTERASE UR-ACNC: NEGATIVE — SIGNIFICANT CHANGE UP
LEUKOCYTE ESTERASE UR-ACNC: NEGATIVE — SIGNIFICANT CHANGE UP
LYMPHOCYTES # BLD AUTO: 0.91 K/UL — LOW (ref 1–3.3)
LYMPHOCYTES # BLD AUTO: 0.91 K/UL — LOW (ref 1–3.3)
LYMPHOCYTES # BLD AUTO: 22.4 % — SIGNIFICANT CHANGE UP (ref 13–44)
LYMPHOCYTES # BLD AUTO: 22.4 % — SIGNIFICANT CHANGE UP (ref 13–44)
MCHC RBC-ENTMCNC: 29.6 PG — SIGNIFICANT CHANGE UP (ref 27–34)
MCHC RBC-ENTMCNC: 29.6 PG — SIGNIFICANT CHANGE UP (ref 27–34)
MCHC RBC-ENTMCNC: 35.8 GM/DL — SIGNIFICANT CHANGE UP (ref 32–36)
MCHC RBC-ENTMCNC: 35.8 GM/DL — SIGNIFICANT CHANGE UP (ref 32–36)
MCV RBC AUTO: 82.8 FL — SIGNIFICANT CHANGE UP (ref 80–100)
MCV RBC AUTO: 82.8 FL — SIGNIFICANT CHANGE UP (ref 80–100)
MONOCYTES # BLD AUTO: 0.43 K/UL — SIGNIFICANT CHANGE UP (ref 0–0.9)
MONOCYTES # BLD AUTO: 0.43 K/UL — SIGNIFICANT CHANGE UP (ref 0–0.9)
MONOCYTES NFR BLD AUTO: 10.6 % — SIGNIFICANT CHANGE UP (ref 2–14)
MONOCYTES NFR BLD AUTO: 10.6 % — SIGNIFICANT CHANGE UP (ref 2–14)
NEUTROPHILS # BLD AUTO: 2.65 K/UL — SIGNIFICANT CHANGE UP (ref 1.8–7.4)
NEUTROPHILS # BLD AUTO: 2.65 K/UL — SIGNIFICANT CHANGE UP (ref 1.8–7.4)
NEUTROPHILS NFR BLD AUTO: 65.4 % — SIGNIFICANT CHANGE UP (ref 43–77)
NEUTROPHILS NFR BLD AUTO: 65.4 % — SIGNIFICANT CHANGE UP (ref 43–77)
NITRITE UR-MCNC: NEGATIVE — SIGNIFICANT CHANGE UP
NITRITE UR-MCNC: NEGATIVE — SIGNIFICANT CHANGE UP
NRBC # BLD: 0 /100 WBCS — SIGNIFICANT CHANGE UP (ref 0–0)
NRBC # BLD: 0 /100 WBCS — SIGNIFICANT CHANGE UP (ref 0–0)
OSMOLALITY SERPL: 257 MOSMOL/KG — LOW (ref 280–301)
OSMOLALITY SERPL: 257 MOSMOL/KG — LOW (ref 280–301)
OSMOLALITY UR: 906 MOS/KG — HIGH (ref 300–900)
OSMOLALITY UR: 906 MOS/KG — HIGH (ref 300–900)
PH UR: 6.5 — SIGNIFICANT CHANGE UP (ref 5–8)
PH UR: 6.5 — SIGNIFICANT CHANGE UP (ref 5–8)
PLATELET # BLD AUTO: 146 K/UL — LOW (ref 150–400)
PLATELET # BLD AUTO: 146 K/UL — LOW (ref 150–400)
POTASSIUM SERPL-MCNC: 4.2 MMOL/L — SIGNIFICANT CHANGE UP (ref 3.5–5.3)
POTASSIUM SERPL-MCNC: 4.2 MMOL/L — SIGNIFICANT CHANGE UP (ref 3.5–5.3)
POTASSIUM SERPL-SCNC: 4.2 MMOL/L — SIGNIFICANT CHANGE UP (ref 3.5–5.3)
POTASSIUM SERPL-SCNC: 4.2 MMOL/L — SIGNIFICANT CHANGE UP (ref 3.5–5.3)
POTASSIUM SERPL-SCNC: 4.8 MMOL/L
PROT SERPL-MCNC: 7 G/DL
PROT SERPL-MCNC: 7 G/DL — SIGNIFICANT CHANGE UP (ref 6–8.3)
PROT SERPL-MCNC: 7 G/DL — SIGNIFICANT CHANGE UP (ref 6–8.3)
PROT UR-MCNC: ABNORMAL
PROT UR-MCNC: ABNORMAL
RBC # BLD: 4.42 M/UL — SIGNIFICANT CHANGE UP (ref 4.2–5.8)
RBC # BLD: 4.42 M/UL — SIGNIFICANT CHANGE UP (ref 4.2–5.8)
RBC # FLD: 12.9 % — SIGNIFICANT CHANGE UP (ref 10.3–14.5)
RBC # FLD: 12.9 % — SIGNIFICANT CHANGE UP (ref 10.3–14.5)
RBC CASTS # UR COMP ASSIST: 2 /HPF — SIGNIFICANT CHANGE UP (ref 0–4)
RBC CASTS # UR COMP ASSIST: 2 /HPF — SIGNIFICANT CHANGE UP (ref 0–4)
RSV RNA NPH QL NAA+NON-PROBE: SIGNIFICANT CHANGE UP
RSV RNA NPH QL NAA+NON-PROBE: SIGNIFICANT CHANGE UP
SARS-COV-2 RNA SPEC QL NAA+PROBE: DETECTED
SARS-COV-2 RNA SPEC QL NAA+PROBE: DETECTED
SODIUM SERPL-SCNC: 121 MMOL/L — LOW (ref 135–145)
SODIUM SERPL-SCNC: 121 MMOL/L — LOW (ref 135–145)
SODIUM SERPL-SCNC: 122 MMOL/L
SODIUM UR-SCNC: 131 MMOL/L — SIGNIFICANT CHANGE UP
SODIUM UR-SCNC: 131 MMOL/L — SIGNIFICANT CHANGE UP
SP GR SPEC: 1.03 — HIGH (ref 1.01–1.02)
SP GR SPEC: 1.03 — HIGH (ref 1.01–1.02)
T4 FREE SERPL-MCNC: 1.3 NG/DL
TROPONIN T, HIGH SENSITIVITY RESULT: 7 NG/L — SIGNIFICANT CHANGE UP (ref 0–51)
TROPONIN T, HIGH SENSITIVITY RESULT: 7 NG/L — SIGNIFICANT CHANGE UP (ref 0–51)
TSH SERPL-MCNC: 1.22 UIU/ML — SIGNIFICANT CHANGE UP (ref 0.27–4.2)
TSH SERPL-MCNC: 1.22 UIU/ML — SIGNIFICANT CHANGE UP (ref 0.27–4.2)
UROBILINOGEN FLD QL: NEGATIVE — SIGNIFICANT CHANGE UP
UROBILINOGEN FLD QL: NEGATIVE — SIGNIFICANT CHANGE UP
WBC # BLD: 4.06 K/UL — SIGNIFICANT CHANGE UP (ref 3.8–10.5)
WBC # BLD: 4.06 K/UL — SIGNIFICANT CHANGE UP (ref 3.8–10.5)
WBC # FLD AUTO: 4.06 K/UL — SIGNIFICANT CHANGE UP (ref 3.8–10.5)
WBC # FLD AUTO: 4.06 K/UL — SIGNIFICANT CHANGE UP (ref 3.8–10.5)
WBC UR QL: 1 /HPF — SIGNIFICANT CHANGE UP (ref 0–5)
WBC UR QL: 1 /HPF — SIGNIFICANT CHANGE UP (ref 0–5)

## 2023-10-27 PROCEDURE — 71045 X-RAY EXAM CHEST 1 VIEW: CPT | Mod: 26

## 2023-10-27 PROCEDURE — 99285 EMERGENCY DEPT VISIT HI MDM: CPT | Mod: GC

## 2023-10-27 PROCEDURE — 99222 1ST HOSP IP/OBS MODERATE 55: CPT | Mod: GC

## 2023-10-27 RX ORDER — TAMSULOSIN HYDROCHLORIDE 0.4 MG/1
1 CAPSULE ORAL
Qty: 0 | Refills: 0 | DISCHARGE

## 2023-10-27 RX ORDER — OMEPRAZOLE 10 MG/1
1 CAPSULE, DELAYED RELEASE ORAL
Qty: 0 | Refills: 0 | DISCHARGE

## 2023-10-27 RX ORDER — CHOLECALCIFEROL (VITAMIN D3) 125 MCG
1 CAPSULE ORAL
Qty: 0 | Refills: 0 | DISCHARGE

## 2023-10-27 RX ORDER — LEVOTHYROXINE SODIUM 125 MCG
1 TABLET ORAL
Refills: 0 | DISCHARGE

## 2023-10-27 RX ORDER — SODIUM CHLORIDE 9 MG/ML
1 INJECTION INTRAMUSCULAR; INTRAVENOUS; SUBCUTANEOUS
Refills: 0 | DISCHARGE

## 2023-10-27 RX ORDER — CITALOPRAM 10 MG/1
20 TABLET, FILM COATED ORAL DAILY
Refills: 0 | Status: DISCONTINUED | OUTPATIENT
Start: 2023-10-27 | End: 2023-10-30

## 2023-10-27 RX ORDER — TAMSULOSIN HYDROCHLORIDE 0.4 MG/1
0.8 CAPSULE ORAL AT BEDTIME
Refills: 0 | Status: DISCONTINUED | OUTPATIENT
Start: 2023-10-27 | End: 2023-10-30

## 2023-10-27 RX ORDER — ASCORBIC ACID 60 MG
1 TABLET,CHEWABLE ORAL
Qty: 0 | Refills: 0 | DISCHARGE

## 2023-10-27 RX ORDER — PANTOPRAZOLE SODIUM 20 MG/1
40 TABLET, DELAYED RELEASE ORAL
Refills: 0 | Status: DISCONTINUED | OUTPATIENT
Start: 2023-10-27 | End: 2023-10-30

## 2023-10-27 RX ORDER — SODIUM CHLORIDE 9 MG/ML
1 INJECTION INTRAMUSCULAR; INTRAVENOUS; SUBCUTANEOUS EVERY 12 HOURS
Refills: 0 | Status: DISCONTINUED | OUTPATIENT
Start: 2023-10-27 | End: 2023-10-30

## 2023-10-27 RX ORDER — SODIUM CHLORIDE 9 MG/ML
1 INJECTION INTRAMUSCULAR; INTRAVENOUS; SUBCUTANEOUS DAILY
Refills: 0 | Status: DISCONTINUED | OUTPATIENT
Start: 2023-10-27 | End: 2023-10-27

## 2023-10-27 RX ORDER — LEVOTHYROXINE SODIUM 125 MCG
75 TABLET ORAL DAILY
Refills: 0 | Status: DISCONTINUED | OUTPATIENT
Start: 2023-10-27 | End: 2023-10-30

## 2023-10-27 RX ORDER — TAMSULOSIN HYDROCHLORIDE 0.4 MG/1
2 CAPSULE ORAL
Refills: 0 | DISCHARGE

## 2023-10-27 RX ORDER — INFLUENZA VIRUS VACCINE 15; 15; 15; 15 UG/.5ML; UG/.5ML; UG/.5ML; UG/.5ML
0.7 SUSPENSION INTRAMUSCULAR ONCE
Refills: 0 | Status: DISCONTINUED | OUTPATIENT
Start: 2023-10-27 | End: 2023-10-30

## 2023-10-27 NOTE — CONSULT NOTE ADULT - ATTENDING COMMENTS
Agree with assessment and plan as above by Dr. Arciniega. Reviewed all pertinent labs, glucose values, and imaging studies. Modifications made as indicated above. Pt. with hx of post-op DI and central hypothyroidism s/p pituitary adenoma resection presenting with symptomatic hyponatremia in the setting of COVID infection. Possible SIADH in the setting of stress/infection vs. dehydration. Chemically euthyroid on current dose of levothyroxine. No evidence of AI. Would hold DDAVP. Consider 1/2NS IV at 100 cc/hr x 12-24 hours. Would add NaCl tabs 2gm daily. Trend sodium, urine output, and urine osmolality.     Silvano Palmer D.O  [phone]

## 2023-10-27 NOTE — ED ADULT NURSE NOTE - NSFALLUNIVINTERV_ED_ALL_ED
Bed/Stretcher in lowest position, wheels locked, appropriate side rails in place/Call bell, personal items and telephone in reach/Instruct patient to call for assistance before getting out of bed/chair/stretcher/Non-slip footwear applied when patient is off stretcher/[place] to call system/Physically safe environment - no spills, clutter or unnecessary equipment/Purposeful proactive rounding/Room/bathroom lighting operational, light cord in reach

## 2023-10-27 NOTE — ED ADULT NURSE NOTE - OBJECTIVE STATEMENT
66 yo male A&OX4 ambulatory on arrival c/o nonradiating mid sternal chest pain, generalized bodyaches/weakness since being diagnosed with COVID last Saturday. Patient currently denying SOB, LOC, palpitations, fevers/chills. Patient states had blood work drawn and sodium was 122 and told by PMD to come to ED. patient has hx hyponatremia. Neuro exam intact, PERRL, no changes to vision/AMS.

## 2023-10-27 NOTE — PATIENT PROFILE ADULT - FALL HARM RISK - HARM RISK INTERVENTIONS

## 2023-10-27 NOTE — ED ADULT TRIAGE NOTE - CHIEF COMPLAINT QUOTE
tested positive for Covid 5 days ago, finished antibiotic, chest pain this morning, called by MD today for low sodium

## 2023-10-27 NOTE — ED PROVIDER NOTE - OBJECTIVE STATEMENT
67-year-old male past medical history of diabetes insipidus secondary to pituitary adenoma on desmopressin oral spray, recurrent diverticulitis status post colectomy, reports going on a fishing trip a week prior following which had COVID infection.  Currently presenting to the ED complaining of lethargy, shaky legs for the past 2 days.    Patient reports using his desmopressin overnight, did not spray 1 day prior, has been taking normal fluid intake, does not report any urinary complaints/dilution, uses desmopressin spray twice today following which developed polyuria.    Patient work-up indicated hyponatremia to 122, and patient was sent to the ED for further evaluation.    Also reports shortness of breath on exertion, nonproductive cough, back pain.  Is compliant with his medications.

## 2023-10-27 NOTE — ED PROVIDER NOTE - PROGRESS NOTE DETAILS
Harris FELDMAN: ENDO on board/aware. Will follow patient, awaiting labs, discontinue Desmopressin. Harris FELDMAN: PCP Yobany De La Cruz, will admit to unattached on call.

## 2023-10-27 NOTE — CONSULT NOTE ADULT - ASSESSMENT
66 y/o M w/ hx of nonfunctioning pituitary macroadenoma s/p TSR x 3 with recurrence with postoperative central DI and central hypothyroidisim presents to ED for hypoantremia.    #Hyponatremia  Likely 2/2 illness/increased fluid intake when patient had COVID infection last week.    #Secondary Hypothyroidism  10/26/23: FT4 1.3  -c/w home levothyroxine 75mcg daily    ************NOTE INCOMPLETE*******************    Ion Arciniega MD  Endocrine Fellow  Can be reached via [employer] teams.    For follow up questions, discharge recommendations, or new consults, please email [email or web address] ([email or web address]) or [email or web address] ([facility]) or call answering service at [phone] (weekdays); [phone] (nights/weekends).  For emergiencies please page fellow on call.     66 y/o M w/ hx of nonfunctioning pituitary macroadenoma s/p TSR x 3 with recurrence with postoperative central DI and central hypothyroidisim presents to ED for hypoantremia.    #Hyponatremia  Likely 2/2 SIADH (high urine Osm and Urine Na) - patient with central diabetes insipidus so would not expect any ADH production, however, in times of illness patient may be producing ADH which can cause this recurrent hyponatremia. Can also be 2/2 increased fluid intake when patient had COVID infection last week.  - AM cortisol 10  PLAN:  - hold DDAVP  - fluid restriction 1L  - Start NaCl 1g BID  - given we are holding DDAVP, we may expect increased urine output  *DI WATCH*:  - Goal Na 140-145  - Please monitor strict I/O  - DI is suspected if UOP is >250cc/hr x 2 consecutive hours or if >500cc/hr x 1 hr - if this occurs please check STAT serum Na, urine osm, urine specific gravity  - if specific gravity <1.005/Urine Osm <300 and Na >140- likely DI, please dose DDAVP 0.05mg PO x1 and continue DI watch.     #Secondary Hypothyroidism  10/26/23: FT4 1.3  -c/w home levothyroxine 75mcg daily    Ion Arcniiega MD  Endocrine Fellow  Can be reached via [employer] teams.    For follow up questions, discharge recommendations, or new consults, please email [email or web address] ([facility]) or [email or web address] ([facility]) or call answering service at [phone] (weekdays); [phone] (nights/weekends).  For emergiencies please page fellow on call.

## 2023-10-27 NOTE — ED PROVIDER NOTE - NS ED ROS FT
CONSTITUTIONAL: c/o weakness   EYES/ENT: No visual changes;  No throat pain   NECK: No pain   RESPIRATORY: c/o cough, SOB   CARDIOVASCULAR: No chest pain or palpitations  GASTROINTESTINAL: No abdominal or epigastric pain. No nausea, vomiting, or hematemesis; No diarrhea or constipation.   GENITOURINARY: No dysuria, frequency   NEUROLOGICAL: No numbness or weakness  SKIN: No rashes, or lesions     All other review of systems is negative unless indicated above.

## 2023-10-27 NOTE — H&P ADULT - ASSESSMENT
The patient is a 67y Male complaining of Weakness for 3 days    Hyponatremia:    Likely from DI  Endo eval appreciated  NaCL tab  Cw Synthroid    MDD:    Phan Celexa

## 2023-10-27 NOTE — H&P ADULT - REASON FOR ADMISSION
The patient is a 67y Male complaining of chest pain. The patient is a 67y Male complaining of Weakness for 3 days

## 2023-10-27 NOTE — ED PROVIDER NOTE - PHYSICAL EXAMINATION
PHYSICAL EXAM:    GENERAL: NAD, lying in bed comfortably  HEAD:  Atraumatic, Normocephalic  EYES: EOMI, PERRLA, conjunctiva and sclera clear  ENT: No erythema/pallor/petechiae/lesions  NECK: Supple  LUNG: CTA b/l; no r/r/w  HEART: RRR, +S1/S2; No m/r/g  ABDOMEN: soft, NT/ND; BS audible   EXTREMITIES:  2+ Peripheral Pulses, brisk cap refill. No clubbing, edema  NERVOUS SYSTEM:  AAOx3, speech clear. No sensory/motor deficits   SKIN: No rashes or lesions

## 2023-10-27 NOTE — ED PROVIDER NOTE - ATTENDING CONTRIBUTION TO CARE
Attending MD Huerta:  I personally have seen and examined this patient. I have performed a substantive portion of the visit including all aspects of the medical decision making.  Resident note reviewed and agree on plan of care and except where noted.    67-year-old with history of pituitary adenoma complicated by diabetes insipidus on desmopressin presenting for evaluation of chest pain and low sodium in the setting of diagnosed COVID 19.  Patient developed body aches and cough 7 days prior to arrival.  Patient states the pain is in the anterior chest, he has difficulty describing what it feels like, it is worse with coughing and deep inspiration.  He is also feeling short of breath with exertion.  He was seen by his outpatient endocrinologist and had blood work with a notable sodium of 122.  The patient has been using his desmopressin spray last night, he missed 1 dose prior to this.  He states he is urinating less today.  Some nausea but no emesis.    Patient's vital signs are nonactionable.  Awake and alert fully oriented somewhat anxious appearing however.  Moves all extremity spontaneously.  Regular heart sounds clear lungs posteriorly.  Extremities warm and well-perfused.  Moist mucous membranes.    ECG recorded at 1059 independently interpreted by me at 12 00 and shows normal sinus rhythm right axis deviation, no ST elevation or ST depression.  Intervals within normal limits.` Compared with ECG 8/28/2023, no interval changes.    Patient presenting for evaluation of chest pain shortness of breath and ongoing cough as well as moderate hyponatremia in the setting of COVID-19 illness.  Regarding chest pain, no diagnostic ischemic findings on ECG, could be pleurisy from COVID illness however will assess for pulmonary embolism with D-dimer and chest x-ray to rule out infiltrates or pneumothorax.  Will obtain cardiac biomarkers to exclude myocarditis.  Regarding hyponatremia, patient is not severely or moderately symptomatic at this time, he does not require urgent correction of his sodium.  Hyponatremia is likely multifactorial in the setting of acute illness and desmopressin use.  For the time being we will make patient n.p.o., obtain urine and lab studies and discussed case with endocrinology.  Will obtain TSH and cortisol level.          *The above represents an initial assessment/impression. Please refer to progress notes for potential changes in patient clinical course*

## 2023-10-27 NOTE — ED PROVIDER NOTE - CLINICAL SUMMARY MEDICAL DECISION MAKING FREE TEXT BOX
67-year-old male past medical history of diabetes insipidus secondary to pituitary adenoma on desmopressin oral spray, recurrent diverticulitis status post colectomy, reports going on a fishing trip a week prior following which had COVID infection, discontnued Desmopressin a day prior and took 1 additional dose today.  Currently presenting to the ED complaining of lethargy, shaky legs, polyuria for the past 1-2 days.  Hemodyn stable. On   Patient reports using his desmopressin overnight, did not spray 1 day prior, has been taking normal fluid intake, does not report any urinary complaints/dilution, uses desmopressin spray twice today following which developed polyuria.    Patient work-up indicated hyponatremia to 122, and patient was sent to the ED for further evaluation.    Also reports shortness of breath on exertion, nonproductive cough, back pain.  Is compliant with his medications. 67-year-old male past medical history of diabetes insipidus secondary to pituitary adenoma on desmopressin oral spray, recurrent diverticulitis status post colectomy, reports going on a fishing trip a week prior following which had COVID infection, discontnued Desmopressin a day prior and took 1 additional dose today.  Currently presenting to the ED complaining of lethargy, shaky legs, polyuria for the past 1-2 days.  Outpatient sodium 122, Hemodyn stable for now. Concern for hyponatremia sec. to active infection. Will involve ENDO, likely admit.

## 2023-10-28 DIAGNOSIS — E87.1 HYPO-OSMOLALITY AND HYPONATREMIA: ICD-10-CM

## 2023-10-28 LAB
ALBUMIN SERPL ELPH-MCNC: 4.4 G/DL — SIGNIFICANT CHANGE UP (ref 3.3–5)
ALBUMIN SERPL ELPH-MCNC: 4.4 G/DL — SIGNIFICANT CHANGE UP (ref 3.3–5)
ALBUMIN SERPL ELPH-MCNC: 4.5 G/DL — SIGNIFICANT CHANGE UP (ref 3.3–5)
ALBUMIN SERPL ELPH-MCNC: 4.5 G/DL — SIGNIFICANT CHANGE UP (ref 3.3–5)
ALP SERPL-CCNC: 62 U/L — SIGNIFICANT CHANGE UP (ref 40–120)
ALP SERPL-CCNC: 62 U/L — SIGNIFICANT CHANGE UP (ref 40–120)
ALP SERPL-CCNC: 67 U/L — SIGNIFICANT CHANGE UP (ref 40–120)
ALP SERPL-CCNC: 67 U/L — SIGNIFICANT CHANGE UP (ref 40–120)
ALT FLD-CCNC: 22 U/L — SIGNIFICANT CHANGE UP (ref 10–45)
ALT FLD-CCNC: 22 U/L — SIGNIFICANT CHANGE UP (ref 10–45)
ALT FLD-CCNC: 24 U/L — SIGNIFICANT CHANGE UP (ref 10–45)
ALT FLD-CCNC: 24 U/L — SIGNIFICANT CHANGE UP (ref 10–45)
ANION GAP SERPL CALC-SCNC: 10 MMOL/L — SIGNIFICANT CHANGE UP (ref 5–17)
ANION GAP SERPL CALC-SCNC: 10 MMOL/L — SIGNIFICANT CHANGE UP (ref 5–17)
ANION GAP SERPL CALC-SCNC: 12 MMOL/L — SIGNIFICANT CHANGE UP (ref 5–17)
ANION GAP SERPL CALC-SCNC: 12 MMOL/L — SIGNIFICANT CHANGE UP (ref 5–17)
APPEARANCE UR: CLEAR — SIGNIFICANT CHANGE UP
AST SERPL-CCNC: 23 U/L — SIGNIFICANT CHANGE UP (ref 10–40)
AST SERPL-CCNC: 23 U/L — SIGNIFICANT CHANGE UP (ref 10–40)
AST SERPL-CCNC: 24 U/L — SIGNIFICANT CHANGE UP (ref 10–40)
AST SERPL-CCNC: 24 U/L — SIGNIFICANT CHANGE UP (ref 10–40)
BILIRUB DIRECT SERPL-MCNC: 0.1 MG/DL — SIGNIFICANT CHANGE UP (ref 0–0.3)
BILIRUB DIRECT SERPL-MCNC: 0.1 MG/DL — SIGNIFICANT CHANGE UP (ref 0–0.3)
BILIRUB INDIRECT FLD-MCNC: 0.3 MG/DL — SIGNIFICANT CHANGE UP (ref 0.2–1)
BILIRUB INDIRECT FLD-MCNC: 0.3 MG/DL — SIGNIFICANT CHANGE UP (ref 0.2–1)
BILIRUB SERPL-MCNC: 0.4 MG/DL — SIGNIFICANT CHANGE UP (ref 0.2–1.2)
BILIRUB SERPL-MCNC: 0.4 MG/DL — SIGNIFICANT CHANGE UP (ref 0.2–1.2)
BILIRUB SERPL-MCNC: 0.6 MG/DL — SIGNIFICANT CHANGE UP (ref 0.2–1.2)
BILIRUB SERPL-MCNC: 0.6 MG/DL — SIGNIFICANT CHANGE UP (ref 0.2–1.2)
BILIRUB UR-MCNC: NEGATIVE — SIGNIFICANT CHANGE UP
BUN SERPL-MCNC: 15 MG/DL — SIGNIFICANT CHANGE UP (ref 7–23)
BUN SERPL-MCNC: 15 MG/DL — SIGNIFICANT CHANGE UP (ref 7–23)
BUN SERPL-MCNC: 21 MG/DL — SIGNIFICANT CHANGE UP (ref 7–23)
BUN SERPL-MCNC: 21 MG/DL — SIGNIFICANT CHANGE UP (ref 7–23)
CALCIUM SERPL-MCNC: 9 MG/DL — SIGNIFICANT CHANGE UP (ref 8.4–10.5)
CALCIUM SERPL-MCNC: 9 MG/DL — SIGNIFICANT CHANGE UP (ref 8.4–10.5)
CALCIUM SERPL-MCNC: 9.7 MG/DL — SIGNIFICANT CHANGE UP (ref 8.4–10.5)
CALCIUM SERPL-MCNC: 9.7 MG/DL — SIGNIFICANT CHANGE UP (ref 8.4–10.5)
CHLORIDE SERPL-SCNC: 92 MMOL/L — LOW (ref 96–108)
CHLORIDE SERPL-SCNC: 92 MMOL/L — LOW (ref 96–108)
CHLORIDE SERPL-SCNC: 98 MMOL/L — SIGNIFICANT CHANGE UP (ref 96–108)
CHLORIDE SERPL-SCNC: 98 MMOL/L — SIGNIFICANT CHANGE UP (ref 96–108)
CO2 SERPL-SCNC: 22 MMOL/L — SIGNIFICANT CHANGE UP (ref 22–31)
CO2 SERPL-SCNC: 22 MMOL/L — SIGNIFICANT CHANGE UP (ref 22–31)
CO2 SERPL-SCNC: 25 MMOL/L — SIGNIFICANT CHANGE UP (ref 22–31)
CO2 SERPL-SCNC: 25 MMOL/L — SIGNIFICANT CHANGE UP (ref 22–31)
COLOR SPEC: COLORLESS — SIGNIFICANT CHANGE UP
CREAT SERPL-MCNC: 1 MG/DL — SIGNIFICANT CHANGE UP (ref 0.5–1.3)
CREAT SERPL-MCNC: 1 MG/DL — SIGNIFICANT CHANGE UP (ref 0.5–1.3)
CREAT SERPL-MCNC: 1.13 MG/DL — SIGNIFICANT CHANGE UP (ref 0.5–1.3)
CREAT SERPL-MCNC: 1.13 MG/DL — SIGNIFICANT CHANGE UP (ref 0.5–1.3)
CREAT SERPL-MCNC: 1.42 MG/DL — HIGH (ref 0.5–1.3)
CREAT SERPL-MCNC: 1.42 MG/DL — HIGH (ref 0.5–1.3)
DIFF PNL FLD: NEGATIVE — SIGNIFICANT CHANGE UP
EGFR: 54 ML/MIN/1.73M2 — LOW
EGFR: 54 ML/MIN/1.73M2 — LOW
EGFR: 71 ML/MIN/1.73M2 — SIGNIFICANT CHANGE UP
EGFR: 71 ML/MIN/1.73M2 — SIGNIFICANT CHANGE UP
EGFR: 82 ML/MIN/1.73M2 — SIGNIFICANT CHANGE UP
EGFR: 82 ML/MIN/1.73M2 — SIGNIFICANT CHANGE UP
GLUCOSE SERPL-MCNC: 103 MG/DL — HIGH (ref 70–99)
GLUCOSE SERPL-MCNC: 103 MG/DL — HIGH (ref 70–99)
GLUCOSE SERPL-MCNC: 89 MG/DL — SIGNIFICANT CHANGE UP (ref 70–99)
GLUCOSE SERPL-MCNC: 89 MG/DL — SIGNIFICANT CHANGE UP (ref 70–99)
GLUCOSE UR QL: NEGATIVE — SIGNIFICANT CHANGE UP
HCT VFR BLD CALC: 41 % — SIGNIFICANT CHANGE UP (ref 39–50)
HCT VFR BLD CALC: 41 % — SIGNIFICANT CHANGE UP (ref 39–50)
HGB BLD-MCNC: 14.3 G/DL — SIGNIFICANT CHANGE UP (ref 13–17)
HGB BLD-MCNC: 14.3 G/DL — SIGNIFICANT CHANGE UP (ref 13–17)
INR BLD: 1.14 RATIO — SIGNIFICANT CHANGE UP (ref 0.85–1.18)
INR BLD: 1.14 RATIO — SIGNIFICANT CHANGE UP (ref 0.85–1.18)
KETONES UR-MCNC: NEGATIVE — SIGNIFICANT CHANGE UP
LEUKOCYTE ESTERASE UR-ACNC: NEGATIVE — SIGNIFICANT CHANGE UP
MCHC RBC-ENTMCNC: 28.7 PG — SIGNIFICANT CHANGE UP (ref 27–34)
MCHC RBC-ENTMCNC: 28.7 PG — SIGNIFICANT CHANGE UP (ref 27–34)
MCHC RBC-ENTMCNC: 34.9 GM/DL — SIGNIFICANT CHANGE UP (ref 32–36)
MCHC RBC-ENTMCNC: 34.9 GM/DL — SIGNIFICANT CHANGE UP (ref 32–36)
MCV RBC AUTO: 82.2 FL — SIGNIFICANT CHANGE UP (ref 80–100)
MCV RBC AUTO: 82.2 FL — SIGNIFICANT CHANGE UP (ref 80–100)
NITRITE UR-MCNC: NEGATIVE — SIGNIFICANT CHANGE UP
NRBC # BLD: 0 /100 WBCS — SIGNIFICANT CHANGE UP (ref 0–0)
NRBC # BLD: 0 /100 WBCS — SIGNIFICANT CHANGE UP (ref 0–0)
OSMOLALITY SERPL: 263 MOSMOL/KG — LOW (ref 280–301)
OSMOLALITY SERPL: 263 MOSMOL/KG — LOW (ref 280–301)
OSMOLALITY UR: 115 MOS/KG — LOW (ref 300–900)
OSMOLALITY UR: 115 MOS/KG — LOW (ref 300–900)
OSMOLALITY UR: 124 MOS/KG — LOW (ref 300–900)
OSMOLALITY UR: 124 MOS/KG — LOW (ref 300–900)
OSMOLALITY UR: 94 MOS/KG — LOW (ref 300–900)
OSMOLALITY UR: 94 MOS/KG — LOW (ref 300–900)
PH UR: 5.5 — SIGNIFICANT CHANGE UP (ref 5–8)
PH UR: 5.5 — SIGNIFICANT CHANGE UP (ref 5–8)
PH UR: 6 — SIGNIFICANT CHANGE UP (ref 5–8)
PH UR: 6 — SIGNIFICANT CHANGE UP (ref 5–8)
PH UR: 7 — SIGNIFICANT CHANGE UP (ref 5–8)
PH UR: 7 — SIGNIFICANT CHANGE UP (ref 5–8)
PLATELET # BLD AUTO: 150 K/UL — SIGNIFICANT CHANGE UP (ref 150–400)
PLATELET # BLD AUTO: 150 K/UL — SIGNIFICANT CHANGE UP (ref 150–400)
POTASSIUM SERPL-MCNC: 4.7 MMOL/L — SIGNIFICANT CHANGE UP (ref 3.5–5.3)
POTASSIUM SERPL-MCNC: 4.7 MMOL/L — SIGNIFICANT CHANGE UP (ref 3.5–5.3)
POTASSIUM SERPL-MCNC: 4.8 MMOL/L — SIGNIFICANT CHANGE UP (ref 3.5–5.3)
POTASSIUM SERPL-MCNC: 4.8 MMOL/L — SIGNIFICANT CHANGE UP (ref 3.5–5.3)
POTASSIUM SERPL-SCNC: 4.7 MMOL/L — SIGNIFICANT CHANGE UP (ref 3.5–5.3)
POTASSIUM SERPL-SCNC: 4.7 MMOL/L — SIGNIFICANT CHANGE UP (ref 3.5–5.3)
POTASSIUM SERPL-SCNC: 4.8 MMOL/L — SIGNIFICANT CHANGE UP (ref 3.5–5.3)
POTASSIUM SERPL-SCNC: 4.8 MMOL/L — SIGNIFICANT CHANGE UP (ref 3.5–5.3)
PROT SERPL-MCNC: 7.2 G/DL — SIGNIFICANT CHANGE UP (ref 6–8.3)
PROT SERPL-MCNC: 7.2 G/DL — SIGNIFICANT CHANGE UP (ref 6–8.3)
PROT SERPL-MCNC: 7.5 G/DL — SIGNIFICANT CHANGE UP (ref 6–8.3)
PROT SERPL-MCNC: 7.5 G/DL — SIGNIFICANT CHANGE UP (ref 6–8.3)
PROT UR-MCNC: NEGATIVE — SIGNIFICANT CHANGE UP
PROTHROM AB SERPL-ACNC: 11.9 SEC — SIGNIFICANT CHANGE UP (ref 9.5–13)
PROTHROM AB SERPL-ACNC: 11.9 SEC — SIGNIFICANT CHANGE UP (ref 9.5–13)
RBC # BLD: 4.99 M/UL — SIGNIFICANT CHANGE UP (ref 4.2–5.8)
RBC # BLD: 4.99 M/UL — SIGNIFICANT CHANGE UP (ref 4.2–5.8)
RBC # FLD: 12.9 % — SIGNIFICANT CHANGE UP (ref 10.3–14.5)
RBC # FLD: 12.9 % — SIGNIFICANT CHANGE UP (ref 10.3–14.5)
SODIUM SERPL-SCNC: 127 MMOL/L — LOW (ref 135–145)
SODIUM SERPL-SCNC: 127 MMOL/L — LOW (ref 135–145)
SODIUM SERPL-SCNC: 130 MMOL/L — LOW (ref 135–145)
SODIUM SERPL-SCNC: 130 MMOL/L — LOW (ref 135–145)
SODIUM SERPL-SCNC: 132 MMOL/L — LOW (ref 135–145)
SODIUM SERPL-SCNC: 132 MMOL/L — LOW (ref 135–145)
SP GR SPEC: 1 — LOW (ref 1.01–1.02)
UROBILINOGEN FLD QL: NEGATIVE — SIGNIFICANT CHANGE UP
WBC # BLD: 3.89 K/UL — SIGNIFICANT CHANGE UP (ref 3.8–10.5)
WBC # BLD: 3.89 K/UL — SIGNIFICANT CHANGE UP (ref 3.8–10.5)
WBC # FLD AUTO: 3.89 K/UL — SIGNIFICANT CHANGE UP (ref 3.8–10.5)
WBC # FLD AUTO: 3.89 K/UL — SIGNIFICANT CHANGE UP (ref 3.8–10.5)

## 2023-10-28 PROCEDURE — 99232 SBSQ HOSP IP/OBS MODERATE 35: CPT

## 2023-10-28 RX ORDER — REMDESIVIR 5 MG/ML
100 INJECTION INTRAVENOUS EVERY 24 HOURS
Refills: 0 | Status: DISCONTINUED | OUTPATIENT
Start: 2023-10-29 | End: 2023-10-30

## 2023-10-28 RX ORDER — ENOXAPARIN SODIUM 100 MG/ML
40 INJECTION SUBCUTANEOUS EVERY 24 HOURS
Refills: 0 | Status: DISCONTINUED | OUTPATIENT
Start: 2023-10-28 | End: 2023-10-30

## 2023-10-28 RX ORDER — REMDESIVIR 5 MG/ML
200 INJECTION INTRAVENOUS EVERY 24 HOURS
Refills: 0 | Status: COMPLETED | OUTPATIENT
Start: 2023-10-28 | End: 2023-10-28

## 2023-10-28 RX ORDER — REMDESIVIR 5 MG/ML
INJECTION INTRAVENOUS
Refills: 0 | Status: DISCONTINUED | OUTPATIENT
Start: 2023-10-28 | End: 2023-10-30

## 2023-10-28 RX ORDER — DESMOPRESSIN ACETATE 0.1 MG/1
0.05 TABLET ORAL ONCE
Refills: 0 | Status: DISCONTINUED | OUTPATIENT
Start: 2023-10-28 | End: 2023-10-28

## 2023-10-28 RX ADMIN — CITALOPRAM 20 MILLIGRAM(S): 10 TABLET, FILM COATED ORAL at 11:50

## 2023-10-28 RX ADMIN — SODIUM CHLORIDE 1 GRAM(S): 9 INJECTION INTRAMUSCULAR; INTRAVENOUS; SUBCUTANEOUS at 06:15

## 2023-10-28 RX ADMIN — ENOXAPARIN SODIUM 40 MILLIGRAM(S): 100 INJECTION SUBCUTANEOUS at 21:25

## 2023-10-28 RX ADMIN — TAMSULOSIN HYDROCHLORIDE 0.8 MILLIGRAM(S): 0.4 CAPSULE ORAL at 21:25

## 2023-10-28 RX ADMIN — Medication 75 MICROGRAM(S): at 06:15

## 2023-10-28 RX ADMIN — SODIUM CHLORIDE 1 GRAM(S): 9 INJECTION INTRAMUSCULAR; INTRAVENOUS; SUBCUTANEOUS at 17:12

## 2023-10-28 RX ADMIN — PANTOPRAZOLE SODIUM 40 MILLIGRAM(S): 20 TABLET, DELAYED RELEASE ORAL at 06:26

## 2023-10-28 NOTE — PROGRESS NOTE ADULT - SUBJECTIVE AND OBJECTIVE BOX
Chief Complaint: Evaluating this 68 y/o M for hyponatremia      Interval History: Sodium improving now off DDAVP, fluid restriction, and NaCl tabs 2gm daily. Symptoms improving as well.     MEDICATIONS  (STANDING):  citalopram 20 milliGRAM(s) Oral daily  influenza  Vaccine (HIGH DOSE) 0.7 milliLiter(s) IntraMuscular once  levothyroxine 75 MICROGram(s) Oral daily  pantoprazole    Tablet 40 milliGRAM(s) Oral before breakfast  remdesivir  IVPB   IV Intermittent   sodium chloride 1 Gram(s) Oral every 12 hours  tamsulosin 0.8 milliGRAM(s) Oral at bedtime    MEDICATIONS  (PRN):      Allergies    No Known Allergies    Intolerances      Review of Systems:  Constitutional: No fever  Eyes: No blurry vision  Cardiovascular: No chest pain  Respiratory: No SOB  GI: No abdominal pain, No nausea, No vomiting  Endocrine: as noted in HPI    All other negative      PHYSICAL EXAM:  VITALS: T(C): 36.9 (10-28-23 @ 16:26)  T(F): 98.4 (10-28-23 @ 16:26), Max: 98.4 (10-28-23 @ 16:26)  HR: 58 (10-28-23 @ 16:26) (52 - 58)  BP: 98/67 (10-28-23 @ 16:26) (98/67 - 132/85)  RR:  (14 - 16)  SpO2:  (99% - 100%)  Wt(kg): --  GENERAL: NAD at this time  EYES: EOMI, No proptosis  HEENT:  Atraumatic, Normocephalic,   RESPIRATORY: Clear to auscultation bilaterally, full excursion, non labored  CARDIOVASCULAR: Regular rhythm; normal S1/S2, no peripheral edema  GI: Soft, nontender, non distended, normal bowel sounds  SKIN: Warm and dry  PSYCH: normal affect, normal mood            10-28    130<L>  |  x   |  x   ----------------------------<  x   x    |  x   |  1.13    eGFR: 71    Ca    9.0      10-28    TPro  7.5  /  Alb  4.5  /  TBili  0.4  /  DBili  0.1  /  AST  24  /  ALT  24  /  AlkPhos  67  10-28        Thyroid Function Tests:  10-27 @ 12:25 TSH 1.22 FreeT4 -- T3 -- Anti TPO -- Anti Thyroglobulin Ab -- TSI --  10-27 @ 12:22 TSH -- FreeT4 1.4 T3 -- Anti TPO -- Anti Thyroglobulin Ab -- TSI --

## 2023-10-28 NOTE — PROVIDER CONTACT NOTE (OTHER) - ACTION/TREATMENT ORDERED:
Provider made aware and entered orders for STAT CMP and UA. Other labs to be drawn as per routine. Will continue to monitor. Sample collection pending.
no new orders
PA made aware. No new orders at this time. Will continue to monitor.

## 2023-10-28 NOTE — PROVIDER CONTACT NOTE (OTHER) - RECOMMENDATIONS
As per endocrinology note, strict I&O must be obtained. If urine output over 2 consecutive hours is above 200 cc, orders for STAT UA and CMP must be obtained.

## 2023-10-28 NOTE — PROVIDER CONTACT NOTE (OTHER) - ASSESSMENT
Urine output between 0100 and 0300 was >500cc.
HR 45-50. Pt is asymptomatic. Other VSS. See VS flowsheet.

## 2023-10-28 NOTE — PROGRESS NOTE ADULT - SUBJECTIVE AND OBJECTIVE BOX
Patient is a 67y old  Male who presents with a chief complaint of The patient is a 67y Male complaining of Weakness for 3 days (28 Oct 2023 12:40)      SUBJECTIVE / OVERNIGHT EVENTS:    Events noted.  CONSTITUTIONAL: No fever,  or fatigue  RESPIRATORY: No cough, wheezing,  No shortness of breath  CARDIOVASCULAR: No chest pain, palpitations, dizziness, or leg swelling  GASTROINTESTINAL: No abdominal or epigastric pain.      MEDICATIONS  (STANDING):  citalopram 20 milliGRAM(s) Oral daily  influenza  Vaccine (HIGH DOSE) 0.7 milliLiter(s) IntraMuscular once  levothyroxine 75 MICROGram(s) Oral daily  pantoprazole    Tablet 40 milliGRAM(s) Oral before breakfast  remdesivir  IVPB   IV Intermittent   sodium chloride 1 Gram(s) Oral every 12 hours  tamsulosin 0.8 milliGRAM(s) Oral at bedtime    MEDICATIONS  (PRN):        CAPILLARY BLOOD GLUCOSE        I&O's Summary    27 Oct 2023 07:  -  28 Oct 2023 07:00  --------------------------------------------------------  IN: 100 mL / OUT: 1650 mL / NET: -1550 mL    28 Oct 2023 07:  -  28 Oct 2023 17:18  --------------------------------------------------------  IN: 680 mL / OUT: 2070 mL / NET: -1390 mL        T(C): 36.9 (10-28-23 @ 16:26), Max: 36.9 (10-28-23 @ 16:26)  HR: 58 (10-28-23 @ 16:26) (52 - 58)  BP: 98/67 (10-28-23 @ 16:26) (98/67 - 132/85)  RR: 16 (10-28-23 @ 16:26) (14 - 16)  SpO2: 99% (10-28-23 @ 16:26) (99% - 100%)    PHYSICAL EXAM:  GENERAL: NAD  NECK: Supple, No JVD  CHEST/LUNG: Clear to auscultation bilaterally; No wheezing.  HEART: Regular rate and rhythm; No murmurs, rubs, or gallops  ABDOMEN: Soft, Nontender, Nondistended; Bowel sounds present  EXTREMITIES:   No edema  NEUROLOGY: AAO X 3      LABS:                        14.3   3.89  )-----------( 150      ( 28 Oct 2023 07:12 )             41.0     10-    130<L>  |  x   |  x   ----------------------------<  x   x    |  x   |  1.13    Ca    9.0      28 Oct 2023 07:12    TPro  7.5  /  Alb  4.5  /  TBili  0.4  /  DBili  0.1  /  AST  24  /  ALT  24  /  AlkPhos  67  10-    PT/INR - ( 28 Oct 2023 14:22 )   PT: 11.9 sec;   INR: 1.14 ratio           CARDIAC MARKERS ( 27 Oct 2023 12:25 )  x     / x     / x     / x     / 4.7 ng/mL      Urinalysis Basic - ( 28 Oct 2023 14:22 )    Color: Colorless / Appearance: Clear / S.003 / pH: x  Gluc: x / Ketone: Negative  / Bili: Negative / Urobili: Negative   Blood: x / Protein: Negative / Nitrite: Negative   Leuk Esterase: Negative / RBC: x / WBC x   Sq Epi: x / Non Sq Epi: x / Bacteria: x      CAPILLARY BLOOD GLUCOSE            RADIOLOGY & ADDITIONAL TESTS:    Imaging Personally Reviewed:    Consultant(s) Notes Reviewed:      Care Discussed with Consultants/Other Providers:    Alfredo Rodríguez MD, CMD, FACP    [phone] [place] [address]  Office Tel: [phone]  Cell: [phone]

## 2023-10-28 NOTE — CONSULT NOTE ADULT - ASSESSMENT
67m h/o f diabetes insipidus secondary to pituitary adenoma on desmopressin oral spray, recurrent diverticulitis status post colectomy  presents with left chest tightness and dyspnea on exertion  covid 19  hyponatremia mild    plan  molupirnavir - s/p 5 days  within time frame to get remdesivir to prevent progression   will start now   remdesivir day 1 of 3-- trend lft  steroids not indicated pt not hypoxic  cxr clear - no antibx indicated  ac ppx  trend biomarkers    isolation per protocol

## 2023-10-28 NOTE — PROVIDER CONTACT NOTE (OTHER) - SITUATION
Pt on tele. Tele tech called several times regarding the patients low HR. HR runs between 45-50.
Patient has increased urine output as per documentation of strict I&O.

## 2023-10-28 NOTE — PROVIDER CONTACT NOTE (OTHER) - BACKGROUND
Admit dx: Hyponatremia secondary to diabetes insipidus; Covid-19  PMH: pituitary adenoma, BPH, diverticulitis
Admit dx: hyponatremia secondary to DI; Covid-19 +  PMH: Pituitary adenoma, DI, hypothyroidism. BPH

## 2023-10-29 LAB
ALBUMIN SERPL ELPH-MCNC: 4.4 G/DL — SIGNIFICANT CHANGE UP (ref 3.3–5)
ALP SERPL-CCNC: 63 U/L — SIGNIFICANT CHANGE UP (ref 40–120)
ALP SERPL-CCNC: 63 U/L — SIGNIFICANT CHANGE UP (ref 40–120)
ALP SERPL-CCNC: 65 U/L — SIGNIFICANT CHANGE UP (ref 40–120)
ALP SERPL-CCNC: 65 U/L — SIGNIFICANT CHANGE UP (ref 40–120)
ALT FLD-CCNC: 29 U/L — SIGNIFICANT CHANGE UP (ref 10–45)
ALT FLD-CCNC: 29 U/L — SIGNIFICANT CHANGE UP (ref 10–45)
ALT FLD-CCNC: 31 U/L — SIGNIFICANT CHANGE UP (ref 10–45)
ALT FLD-CCNC: 31 U/L — SIGNIFICANT CHANGE UP (ref 10–45)
ANION GAP SERPL CALC-SCNC: 11 MMOL/L — SIGNIFICANT CHANGE UP (ref 5–17)
ANION GAP SERPL CALC-SCNC: 11 MMOL/L — SIGNIFICANT CHANGE UP (ref 5–17)
AST SERPL-CCNC: 25 U/L — SIGNIFICANT CHANGE UP (ref 10–40)
BILIRUB DIRECT SERPL-MCNC: 0.1 MG/DL — SIGNIFICANT CHANGE UP (ref 0–0.3)
BILIRUB DIRECT SERPL-MCNC: 0.1 MG/DL — SIGNIFICANT CHANGE UP (ref 0–0.3)
BILIRUB INDIRECT FLD-MCNC: 0.4 MG/DL — SIGNIFICANT CHANGE UP (ref 0.2–1)
BILIRUB INDIRECT FLD-MCNC: 0.4 MG/DL — SIGNIFICANT CHANGE UP (ref 0.2–1)
BILIRUB SERPL-MCNC: 0.5 MG/DL — SIGNIFICANT CHANGE UP (ref 0.2–1.2)
BILIRUB SERPL-MCNC: 0.5 MG/DL — SIGNIFICANT CHANGE UP (ref 0.2–1.2)
BILIRUB SERPL-MCNC: 0.6 MG/DL — SIGNIFICANT CHANGE UP (ref 0.2–1.2)
BILIRUB SERPL-MCNC: 0.6 MG/DL — SIGNIFICANT CHANGE UP (ref 0.2–1.2)
BUN SERPL-MCNC: 18 MG/DL — SIGNIFICANT CHANGE UP (ref 7–23)
BUN SERPL-MCNC: 18 MG/DL — SIGNIFICANT CHANGE UP (ref 7–23)
CALCIUM SERPL-MCNC: 9.5 MG/DL — SIGNIFICANT CHANGE UP (ref 8.4–10.5)
CALCIUM SERPL-MCNC: 9.5 MG/DL — SIGNIFICANT CHANGE UP (ref 8.4–10.5)
CHLORIDE SERPL-SCNC: 102 MMOL/L — SIGNIFICANT CHANGE UP (ref 96–108)
CHLORIDE SERPL-SCNC: 102 MMOL/L — SIGNIFICANT CHANGE UP (ref 96–108)
CO2 SERPL-SCNC: 23 MMOL/L — SIGNIFICANT CHANGE UP (ref 22–31)
CO2 SERPL-SCNC: 23 MMOL/L — SIGNIFICANT CHANGE UP (ref 22–31)
CREAT SERPL-MCNC: 1.25 MG/DL — SIGNIFICANT CHANGE UP (ref 0.5–1.3)
CREAT SERPL-MCNC: 1.25 MG/DL — SIGNIFICANT CHANGE UP (ref 0.5–1.3)
CREAT SERPL-MCNC: 1.26 MG/DL — SIGNIFICANT CHANGE UP (ref 0.5–1.3)
CREAT SERPL-MCNC: 1.26 MG/DL — SIGNIFICANT CHANGE UP (ref 0.5–1.3)
CRP SERPL-MCNC: <3 MG/L — SIGNIFICANT CHANGE UP (ref 0–4)
CRP SERPL-MCNC: <3 MG/L — SIGNIFICANT CHANGE UP (ref 0–4)
D DIMER BLD IA.RAPID-MCNC: 450 NG/ML DDU — HIGH
D DIMER BLD IA.RAPID-MCNC: 450 NG/ML DDU — HIGH
EGFR: 63 ML/MIN/1.73M2 — SIGNIFICANT CHANGE UP
GLUCOSE SERPL-MCNC: 92 MG/DL — SIGNIFICANT CHANGE UP (ref 70–99)
GLUCOSE SERPL-MCNC: 92 MG/DL — SIGNIFICANT CHANGE UP (ref 70–99)
HCT VFR BLD CALC: 42.5 % — SIGNIFICANT CHANGE UP (ref 39–50)
HCT VFR BLD CALC: 42.5 % — SIGNIFICANT CHANGE UP (ref 39–50)
HGB BLD-MCNC: 14.8 G/DL — SIGNIFICANT CHANGE UP (ref 13–17)
HGB BLD-MCNC: 14.8 G/DL — SIGNIFICANT CHANGE UP (ref 13–17)
INR BLD: 1.06 RATIO — SIGNIFICANT CHANGE UP (ref 0.85–1.18)
INR BLD: 1.06 RATIO — SIGNIFICANT CHANGE UP (ref 0.85–1.18)
MAGNESIUM SERPL-MCNC: 2.3 MG/DL — SIGNIFICANT CHANGE UP (ref 1.6–2.6)
MAGNESIUM SERPL-MCNC: 2.3 MG/DL — SIGNIFICANT CHANGE UP (ref 1.6–2.6)
MCHC RBC-ENTMCNC: 29.2 PG — SIGNIFICANT CHANGE UP (ref 27–34)
MCHC RBC-ENTMCNC: 29.2 PG — SIGNIFICANT CHANGE UP (ref 27–34)
MCHC RBC-ENTMCNC: 34.8 GM/DL — SIGNIFICANT CHANGE UP (ref 32–36)
MCHC RBC-ENTMCNC: 34.8 GM/DL — SIGNIFICANT CHANGE UP (ref 32–36)
MCV RBC AUTO: 84 FL — SIGNIFICANT CHANGE UP (ref 80–100)
MCV RBC AUTO: 84 FL — SIGNIFICANT CHANGE UP (ref 80–100)
NRBC # BLD: 0 /100 WBCS — SIGNIFICANT CHANGE UP (ref 0–0)
NRBC # BLD: 0 /100 WBCS — SIGNIFICANT CHANGE UP (ref 0–0)
PLATELET # BLD AUTO: 165 K/UL — SIGNIFICANT CHANGE UP (ref 150–400)
PLATELET # BLD AUTO: 165 K/UL — SIGNIFICANT CHANGE UP (ref 150–400)
POTASSIUM SERPL-MCNC: 4.6 MMOL/L — SIGNIFICANT CHANGE UP (ref 3.5–5.3)
POTASSIUM SERPL-MCNC: 4.6 MMOL/L — SIGNIFICANT CHANGE UP (ref 3.5–5.3)
POTASSIUM SERPL-SCNC: 4.6 MMOL/L — SIGNIFICANT CHANGE UP (ref 3.5–5.3)
POTASSIUM SERPL-SCNC: 4.6 MMOL/L — SIGNIFICANT CHANGE UP (ref 3.5–5.3)
PROT SERPL-MCNC: 7.3 G/DL — SIGNIFICANT CHANGE UP (ref 6–8.3)
PROT SERPL-MCNC: 7.3 G/DL — SIGNIFICANT CHANGE UP (ref 6–8.3)
PROT SERPL-MCNC: 7.4 G/DL — SIGNIFICANT CHANGE UP (ref 6–8.3)
PROT SERPL-MCNC: 7.4 G/DL — SIGNIFICANT CHANGE UP (ref 6–8.3)
PROTHROM AB SERPL-ACNC: 11.6 SEC — SIGNIFICANT CHANGE UP (ref 9.5–13)
PROTHROM AB SERPL-ACNC: 11.6 SEC — SIGNIFICANT CHANGE UP (ref 9.5–13)
RBC # BLD: 5.06 M/UL — SIGNIFICANT CHANGE UP (ref 4.2–5.8)
RBC # BLD: 5.06 M/UL — SIGNIFICANT CHANGE UP (ref 4.2–5.8)
RBC # FLD: 13.3 % — SIGNIFICANT CHANGE UP (ref 10.3–14.5)
RBC # FLD: 13.3 % — SIGNIFICANT CHANGE UP (ref 10.3–14.5)
SODIUM SERPL-SCNC: 136 MMOL/L — SIGNIFICANT CHANGE UP (ref 135–145)
WBC # BLD: 5.27 K/UL — SIGNIFICANT CHANGE UP (ref 3.8–10.5)
WBC # BLD: 5.27 K/UL — SIGNIFICANT CHANGE UP (ref 3.8–10.5)
WBC # FLD AUTO: 5.27 K/UL — SIGNIFICANT CHANGE UP (ref 3.8–10.5)
WBC # FLD AUTO: 5.27 K/UL — SIGNIFICANT CHANGE UP (ref 3.8–10.5)

## 2023-10-29 RX ORDER — CHLORHEXIDINE GLUCONATE 213 G/1000ML
1 SOLUTION TOPICAL DAILY
Refills: 0 | Status: DISCONTINUED | OUTPATIENT
Start: 2023-10-29 | End: 2023-10-30

## 2023-10-29 RX ADMIN — CITALOPRAM 20 MILLIGRAM(S): 10 TABLET, FILM COATED ORAL at 11:45

## 2023-10-29 RX ADMIN — SODIUM CHLORIDE 1 GRAM(S): 9 INJECTION INTRAMUSCULAR; INTRAVENOUS; SUBCUTANEOUS at 06:07

## 2023-10-29 RX ADMIN — PANTOPRAZOLE SODIUM 40 MILLIGRAM(S): 20 TABLET, DELAYED RELEASE ORAL at 06:07

## 2023-10-29 RX ADMIN — CHLORHEXIDINE GLUCONATE 1 APPLICATION(S): 213 SOLUTION TOPICAL at 13:22

## 2023-10-29 RX ADMIN — TAMSULOSIN HYDROCHLORIDE 0.8 MILLIGRAM(S): 0.4 CAPSULE ORAL at 22:16

## 2023-10-29 RX ADMIN — SODIUM CHLORIDE 1 GRAM(S): 9 INJECTION INTRAMUSCULAR; INTRAVENOUS; SUBCUTANEOUS at 17:30

## 2023-10-29 RX ADMIN — Medication 75 MICROGRAM(S): at 06:07

## 2023-10-29 RX ADMIN — ENOXAPARIN SODIUM 40 MILLIGRAM(S): 100 INJECTION SUBCUTANEOUS at 22:18

## 2023-10-29 NOTE — CONSULT NOTE ADULT - REASON FOR ADMISSION
The patient is a 67y Male complaining of Weakness for 3 days
The patient is a 67y Male complaining of Weakness for 3 days
Hyponatremia

## 2023-10-29 NOTE — CONSULT NOTE ADULT - SUBJECTIVE AND OBJECTIVE BOX
Ion Arciniega MD   |   PGY-4  Endocrinology Fellow  Available on [employer] Teams    HPI/Endocrine History:  66 y/o M w/ hx of nonfunctioning pituitary macroadenoma dx in 1991 s/p transsphenoidal hypophysectomy by Dr. Tevin De La Vega at [place] 5/1991. Was managed by Dr. Prashanth Trujillo. Pituitary function remained intact. There was regrowth on the right side in 1997 s/p 2nd TSR by Dr. De La Vega 5/1997. Post-op he developed DI which has persisted since then currently on DDAVP spray at night with hx of hyponatremia and labile sodium levels especially with infection or illness. Has tried vasopressin tablets but prefers nasal spray. MRI surveillance has revealed stability with residual pituitary tissue. In 2007, there was some growth with 3rd TSR with [facility] in 2007. Since 2007 MRI has remained stable and repeat anterior pituitary function has remained intact.      In April 2021 he had an episode of hyperthyroidism likely thyroiditis s/p COVID vaccine with resolution. Treated briefly with methimazole and tapered off 10/2021. Thyroid antibodies were normal. Has been chemically euthyroid since then.     Last seen by Endocrinologist Dr. 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 Pt was started on levothyroxine 75mcg on 10/11/23, repeat FT4 on 10/26/23 was 1.3 Other pituitary labs unremarkable.     Labs done on 10/26/23 which were notable for:     Na 122 (in 9/2023 Na was 137) - patient was sent to ED. Pt reports having COVID 1 week ago (Thurs/Fri) during which he was drinking extra fluids. He reports initialy symptoms of fatigue and myalgias since having covid – myalgias resolved but patient still has residual fatigue, patient has been taking his desmopressin 1 spray daily, taking extra doses. He was instructed to skip doses when he became sick, he treid to skip a dose 2 days ago but had constant polyuria overnight so ended up taking it at 4am. Last desmopressin dose was last night. Pt has sodium chloride tabelts for when he has hyponatreamia symtpoms – he took 1 tab BID x 2 doses 2 days ago with no imprveoement in symptoms. Of note patient in the past patient has previous episodes of hyponatremia in the setting of infection.     #Central Hypothyroidism   --Patient is taking these medications: levothyroxine 75mcg   --Patient is compliant with this medication and taking it every day.   --Patient takes levothyroxine early in the morning 1 hour before any meals/medications   --Denies taking any supplements with levothyroxine.   --Reports fatigue and 5lb weight gain in 1 week   --Denies palpitations, weight loss, heat/cold intolerance, constipation, diarrhea, dry skin, hair loss     PAST MEDICAL & SURGICAL HISTORY:  Pituitary adenoma      Diabetes insipidus      GERD (gastroesophageal reflux disease)      COVID-19      History of BPH      Acute diverticulitis      H/O vertigo      Hyponatremia      Adenoma of pituitary      History of incisional hernia repair      History of bunionectomy          FAMILY HISTORY:  No pertinent family history in first degree relatives        Home Medications:  citalopram 20 mg oral tablet: 1 tab(s) orally once a day (28 Mar 2023 07:33)  desmopressin nasal 0.01% spray (obsolete): 1 spray(s) nasal once a day (at bedtime) (28 Mar 2023 07:33)  multivitamin: 1 tab(s) orally once a day (28 Mar 2023 07:33)  natures bounty probiotic: 1 tab(s) orally once a day (28 Mar 2023 07:33)  omeprazole 20 mg oral delayed release capsule: 1 cap(s) orally once a day (28 Mar 2023 07:33)  tamsulosin 0.4 mg oral capsule: 1 cap(s) orally once a day (at bedtime) (28 Mar 2023 07:33)  vitamin b 12: 1 tab(s) orally once a day (28 Mar 2023 07:33)  vitamin c: 1 tab(s) orally once a day (28 Mar 2023 07:33)  Vitamin D3 25 mcg (1000 intl units) oral tablet: 1 tab(s) orally once a day (28 Mar 2023 07:33)      MEDICATIONS  (STANDING):    MEDICATIONS  (PRN):      Allergies    No Known Allergies    Intolerances      Review of Systems:  Constitutional: No fever + fatigue  Eyes: No blurry vision  Neuro: No tremors  HEENT: No pain  Cardiovascular: No chest pain, palpitations  Respiratory: No SOB, no cough  GI: No nausea, vomiting, abdominal pain  : No dysuria  Skin: no rash  Psych: no depression  Endocrine: no polyuria, polydipsia  Hem/lymph: no swelling  Osteoporosis: no fractures    ===================PHYSICAL EXAM=======================  VITALS: T(C): 37 (10-27-23 @ 10:11)  T(F): 98.6 (10-27-23 @ 10:11), Max: 98.6 (10-27-23 @ 10:11)  HR: 58 (10-27-23 @ 11:41) (58 - 60)  BP: 125/76 (10-27-23 @ 11:41) (125/71 - 125/76)  RR:  (18 - 20)  SpO2:  (100% - 100%)  Wt(kg): --  GENERAL: NAD  EYES: No proptosis, no lid lag, anicteric  THYROID: Normal size, no palpable nodules  RESPIRATORY: Clear to auscultation bilaterally  CARDIOVASCULAR: Regular rate and rhythm  GI: Soft, nontender, non distended  EXT: b/l feet without wounds; 2+ pulses  PSYCH: Alert and oriented x 3, reactive mood  ======================================================                            13.1   4.06  )-----------( 146      ( 27 Oct 2023 12:25 )             36.6       10-27    121<L>  |  90<L>  |  21  ----------------------------<  94  4.2   |  21<L>  |  0.90    eGFR: 94    Ca    8.9      10-27    TPro  7.0  /  Alb  4.1  /  TBili  0.5  /  DBili  x   /  AST  24  /  ALT  22  /  AlkPhos  60  10-27      Thyroid Function Tests:      A1C with Estimated Average Glucose Result: 5.4 % (03-16-23 @ 18:12)          Radiology:             
Patient is a 67y old  Male who presents with a chief complaint of The patient is a 67y Male complaining of Weakness for 3 days (29 Oct 2023 13:51)      HPI:  67-year-old male past medical history of diabetes insipidus secondary to pituitary adenoma on desmopressin oral spray, recurrent diverticulitis status post colectomy, reports going on a fishing trip a week prior following which had COVID infection.  Currently presenting to the ED complaining of lethargy, shaky legs for the past 2 days.    	Patient reports using his desmopressin overnight, did not spray 1 day prior, has been taking normal fluid intake, does not report any urinary complaints/dilution, uses desmopressin spray twice today following which developed polyuria.    	Patient work-up indicated hyponatremia to 122, and patient was sent to the ED for further evaluation.    Also reports shortness of breath on exertion, nonproductive cough, back pain.  Is compliant with his medications.   (27 Oct 2023 16:08)    Today, the patient overall feels well. He was placed on a fluid restriction with salt tabs with appropriate rise in serum Cr as well as improvement of the patient's symptomology.     PAST MEDICAL & SURGICAL HISTORY:  Pituitary adenoma      Diabetes insipidus      GERD (gastroesophageal reflux disease)      COVID-19      History of BPH      Acute diverticulitis      H/O vertigo      Hyponatremia      Adenoma of pituitary      History of incisional hernia repair      History of bunionectomy          MEDICATIONS  (STANDING):  chlorhexidine 2% Cloths 1 Application(s) Topical daily  citalopram 20 milliGRAM(s) Oral daily  enoxaparin Injectable 40 milliGRAM(s) SubCutaneous every 24 hours  influenza  Vaccine (HIGH DOSE) 0.7 milliLiter(s) IntraMuscular once  levothyroxine 75 MICROGram(s) Oral daily  pantoprazole    Tablet 40 milliGRAM(s) Oral before breakfast  remdesivir  IVPB   IV Intermittent   remdesivir  IVPB 100 milliGRAM(s) IV Intermittent every 24 hours  sodium chloride 1 Gram(s) Oral every 12 hours  tamsulosin 0.8 milliGRAM(s) Oral at bedtime      Allergies    No Known Allergies    Intolerances        SOCIAL HISTORY:  Denies ETOh,Smoking,     FAMILY HISTORY:  No pertinent family history in first degree relatives        REVIEW OF SYSTEMS:  CONSTITUTIONAL: No weakness, fevers or chills  EYES/ENT: No visual changes;  No vertigo or throat pain   NECK: No pain or stiffness  RESPIRATORY: No cough, wheezing, hemoptysis; No shortness of breath  CARDIOVASCULAR: No chest pain or palpitations  GASTROINTESTINAL: No abdominal or epigastric pain. No nausea, vomiting, or hematemesis; No diarrhea or constipation. No melena or hematochezia.  GENITOURINARY: No dysuria, frequency or hematuria  NEUROLOGICAL: No numbness or weakness  SKIN: No itching, burning, rashes, or lesions   All other review of systems is negative unless indicated above.    VITAL:  T(C): , Max: 36.9 (10-28-23 @ 16:26)  T(F): , Max: 98.4 (10-28-23 @ 16:26)  HR: 54 (10-29-23 @ 09:20)  BP: 127/75 (10-29-23 @ 09:20)  BP(mean): --  RR: 16 (10-29-23 @ 09:20)  SpO2: 94% (10-29-23 @ 09:20)  Wt(kg): --    PHYSICAL EXAM:  Constitutional: NAD, Alert  HEENT: NCAT, MMM  Neck: Supple, No JVD  Respiratory: CTA-b/l  Cardiovascular: RRR s1s2, no m/r/g  Gastrointestinal: BS+, soft, NT/ND  Extremities: No peripheral edema b/l  Neurological: no focal deficits; strength grossly intact  Back: no CVAT b/l  Skin: No rashes, no nevi    LABS:                        14.8   5.27  )-----------( 165      ( 29 Oct 2023 07:11 )             42.5     Na(136)/K(4.6)/Cl(102)/HCO3(23)/BUN(18)/Cr(1.26)Glu(92)/Ca(9.5)/Mg(2.3)/PO4(--)    10-29 @ 07:09  Na(132)/K(4.7)/Cl(98)/HCO3(22)/BUN(21)/Cr(1.42)Glu(103)/Ca(9.7)/Mg(--)/PO4(--)    10-28 @ 18:57  Na(130)/K(--)/Cl(--)/HCO3(--)/BUN(--)/Cr(1.13)Glu(--)/Ca(--)/Mg(--)/PO4(--)    10-28 @ 14:22  Na(127)/K(4.8)/Cl(92)/HCO3(25)/BUN(15)/Cr(1.00)Glu(89)/Ca(9.0)/Mg(--)/PO4(--)    10-28 @ 07:12  Na(121)/K(4.2)/Cl(90)/HCO3(21)/BUN(21)/Cr(0.90)Glu(94)/Ca(8.9)/Mg(--)/PO4(--)    10-27 @ 12:25    Urinalysis Basic - ( 29 Oct 2023 07:09 )    Color: x / Appearance: x / SG: x / pH: x  Gluc: 92 mg/dL / Ketone: x  / Bili: x / Urobili: x   Blood: x / Protein: x / Nitrite: x   Leuk Esterase: x / RBC: x / WBC x   Sq Epi: x / Non Sq Epi: x / Bacteria: x      Osmolality, Random Urine: 124 mos/kg (10-28 @ 18:57)  Osmolality, Random Urine: 94 mos/kg (10-28 @ 14:22)  Osmolality, Random Urine: 115 mos/kg (10-28 @ 06:43)  Sodium, Random Urine: 131 mmol/L (10-27 @ 15:04)  Osmolality, Random Urine: 906 mos/kg (10-27 @ 15:04)        IMAGING:    ASSESSMENT:  67-year-old male past medical history of diabetes insipidus secondary to pituitary adenoma on desmopressin oral spray, recurrent diverticulitis status post colectomy, reports going on a fishing trip a week prior following which had COVID infection.  Currently presenting to the ED complaining of lethargy, shaky legs for the past 2 days who presents with hyponatremia of 122. Nephrology consulted for hyponatremia    Hypotonic Hyonatremia  -hypervolemic in nature  -2/2 extra intranasal DDAVP dose  -Na has risen appropriately     RECOMMEND:  -Stat repeat Na now  -Sodium has risen appropriately with fluid restriction and salt tabs.   -a/w endocrinology optimal Na would be between 140-145  -Sodium okay up to 140-142 today  -supplement protein intake    Thank you for involving [facility] in this patient's care.    With warm regards,    Noah Bernstein DO  [facility]  Office: [phone]          
[facility], Division of Infectious Diseases  [address] S. Shah, Y. Patel, G. Danny   [phone]  after hours and weekends [phone]    LAURA CARRILLO  67y, Male  [id number]    HPI:  67m h/o f diabetes insipidus secondary to pituitary adenoma on desmopressin oral spray, recurrent diverticulitis status post colectomy  presents with left chest tightness and dyspnea on exertion  states he was away and his friend had covid 19   about 6 days ago, noted sore throat .  Went to  and tested positive for covid 19, he took 5 days of moluprinavir  2 days ago, had dyspnea on exertion   yesterday felt tired  and left chest pain came to ED  does have a mild dry cough, loose stool  but feels better today   no fever     PMH/PSH--  Pituitary adenoma  Diabetes insipidus  GERD (gastroesophageal reflux disease)  COVID-19  History of BPH  Acute diverticulitis  H/O vertigo  Hyponatremia  Adenoma of pituitary  History of incisional hernia repair  History of bunionectomy        Allergies--  nkda     Medications--  Antibiotics:   Immunologic: influenza  Vaccine (HIGH DOSE) 0.7 milliLiter(s) IntraMuscular once    Other: citalopram  levothyroxine  pantoprazole    Tablet  sodium chloride  tamsulosin      Social History--  EtOH: denies ***  Tobacco: former   Drug Use: denies ***    Family/Marital History--  No pertinent family history in first degree relatives          Travel/Environmental/Occupational History:      Review of Systems:  REVIEW OF SYSTEMS  General: no fever, no chills, no wt loss	  Ophthalmologic: no blurry vision  Respiratory and Thorax: + cough, + dyspnea  Cardiovascular: + chest pain, no palpitations  Gastrointestinal:  no nausea, no vomiting, diarrhea  Genitourinary: no dysuria, no urgency, no frequency	  Musculoskeletal: no myalgias	  Neurological:  no headache	    Physical Exam--  Vital Signs: T(F): 98.2 (10-28-23 @ 08:37), Max: 98.2 (10-28-23 @ 08:37)  HR: 52 (10-28-23 @ 08:37)  BP: 127/75 (10-28-23 @ 08:37)  RR: 16 (10-28-23 @ 08:37)  SpO2: 100% (10-28-23 @ 08:37)  Wt(kg): --  General: Nontoxic-appearing Male in no acute distress.  HEENT: AT/NC.   Neck: Not rigid. No sense of mass.  Nodes: None palpable.  Lungs: Clear bilaterally without rales, wheezing or rhonchi  Heart: Regular rate and rhythm.   Abdomen: Bowel sounds present and normoactive. Soft. Nondistended. Nontender.   Back: No spinal tenderness. No costovertebral angle tenderness.   Extremities: No cyanosis or clubbing. No edema.   Skin: Warm. Dry. Good turgor. No rash. No vasculitic stigmata.  Psychiatric: Appropriate affect and mood for situation.         Laboratory & Imaging Data--  CBC                        14.3   3.89  )-----------( 150      ( 28 Oct 2023 07:12 )             41.0       Chemistries  10-28    127<L>  |  92<L>  |  15  ----------------------------<  89  4.8   |  25  |  1.00    Ca    9.0      28 Oct 2023 07:12    TPro  7.2  /  Alb  4.4  /  TBili  0.6  /  DBili  x   /  AST  23  /  ALT  22  /  AlkPhos  62  10-28      Culture Data

## 2023-10-29 NOTE — PROGRESS NOTE ADULT - SUBJECTIVE AND OBJECTIVE BOX
[facility], Division of Infectious Diseases  HILL Dorsey Y. Patel, S. Shah, G. Danny  [phone]  after hours and weekends [phone]    Name: LAURA CARRILLO  Age: 67y  Gender: Male  MRN: [id number]    Interval History--  Notes reviewed  feels better  did not take remdesivir states he does not want it       Allergies    No Known Allergies    Intolerances        Medications--  Antibiotics:  remdesivir  IVPB   IV Intermittent   remdesivir  IVPB 100 milliGRAM(s) IV Intermittent every 24 hours    Immunologic:  influenza  Vaccine (HIGH DOSE) 0.7 milliLiter(s) IntraMuscular once    Other:  chlorhexidine 2% Cloths  citalopram  enoxaparin Injectable  levothyroxine  pantoprazole    Tablet  sodium chloride  tamsulosin      Review of Systems--  A 10-point review of systems was obtained.     Pertinent positives and negatives--  Constitutional: No fevers. No Chills. No Rigors.   Cardiovascular: No chest pain. No palpitations.  Respiratory: No shortness of breath. No cough.  Gastrointestinal: No nausea or vomiting. No diarrhea or constipation.   Psychiatric: Pleasant. Appropriate affect.    Review of systems otherwise negative except as previously noted.    Physical Examination--  Vital Signs: T(F): 98 (10-29-23 @ 09:20), Max: 98.4 (10-28-23 @ 16:26)  HR: 54 (10-29-23 @ 09:20)  BP: 127/75 (10-29-23 @ 09:20)  RR: 16 (10-29-23 @ 09:20)  SpO2: 94% (10-29-23 @ 09:20)  Wt(kg): --  General: Nontoxic-appearing Male in no acute distress.  HEENT: AT/NC.   Neck: Not rigid. No sense of mass.  Nodes: None palpable.  Lungs: Clear bilaterally without rales, wheezing or rhonchi  Heart: Regular rate and rhythm.   Abdomen: Bowel sounds present and normoactive. Soft. Nondistended. Nontender.   Extremities: No cyanosis or clubbing. No edema.   Skin: Warm. Dry. Good turgor. No rash. No vasculitic stigmata.  Psychiatric: Appropriate affect and mood for situation.         Laboratory Studies--  CBC                        14.8   5.27  )-----------( 165      ( 29 Oct 2023 07:11 )             42.5       Chemistries  10-29    136  |  102  |  18  ----------------------------<  92  4.6   |  23  |  1.26    Ca    9.5      29 Oct 2023 07:09  Mg     2.3     10-29    TPro  7.4  /  Alb  4.4  /  TBili  0.6  /  DBili  0.1  /  AST  25  /  ALT  29  /  AlkPhos  63  10-29      Culture Data

## 2023-10-29 NOTE — PROGRESS NOTE ADULT - SUBJECTIVE AND OBJECTIVE BOX
Date of service: 10-29-23 @ 22:46      Patient is a 67y old  Male who presents with a chief complaint of The patient is a 67y Male complaining of Weakness for 3 days (29 Oct 2023 14:44)                                                               INTERVAL HPI/OVERNIGHT EVENTS:    REVIEW OF SYSTEMS:     CONSTITUTIONAL: No weakness, fevers or chills  EYES/ENT: No visual changes , no ear ache   NECK: No pain or stiffness  RESPIRATORY: No cough, wheezing,  No shortness of breath  CARDIOVASCULAR: No chest pain or palpitations  GASTROINTESTINAL: No abdominal pain  . No nausea, vomiting, or hematemesis; No diarrhea or constipation. No melena or hematochezia.  GENITOURINARY: No dysuria, frequency or hematuria  NEUROLOGICAL: No numbness or weakness  SKIN: No itching, burning, rashes, or lesions                                                                                                                                                                                                                                                                                 Medications:  MEDICATIONS  (STANDING):  chlorhexidine 2% Cloths 1 Application(s) Topical daily  citalopram 20 milliGRAM(s) Oral daily  enoxaparin Injectable 40 milliGRAM(s) SubCutaneous every 24 hours  influenza  Vaccine (HIGH DOSE) 0.7 milliLiter(s) IntraMuscular once  levothyroxine 75 MICROGram(s) Oral daily  pantoprazole    Tablet 40 milliGRAM(s) Oral before breakfast  remdesivir  IVPB   IV Intermittent   remdesivir  IVPB 100 milliGRAM(s) IV Intermittent every 24 hours  sodium chloride 1 Gram(s) Oral every 12 hours  tamsulosin 0.8 milliGRAM(s) Oral at bedtime    MEDICATIONS  (PRN):       Allergies    No Known Allergies    Intolerances      Vital Signs Last 24 Hrs  T(C): 36.8 (29 Oct 2023 16:04), Max: 36.8 (29 Oct 2023 16:04)  T(F): 98.2 (29 Oct 2023 16:04), Max: 98.2 (29 Oct 2023 16:04)  HR: 54 (29 Oct 2023 16:04) (54 - 62)  BP: 113/64 (29 Oct 2023 16:04) (113/64 - 127/75)  BP(mean): --  RR: 18 (29 Oct 2023 16:04) (16 - 18)  SpO2: 94% (29 Oct 2023 16:04) (94% - 97%)    Parameters below as of 29 Oct 2023 16:04  Patient On (Oxygen Delivery Method): room air      CAPILLARY BLOOD GLUCOSE          10-28 @ 07:01  -  10-29 @ 07:00  --------------------------------------------------------  IN: 680 mL / OUT: 3845 mL / NET: -3165 mL    10-29 @ 07:01  -  10-29 @ 22:46  --------------------------------------------------------  IN: 240 mL / OUT: 725 mL / NET: -485 mL      Physical Exam:    Daily     Daily   General:  Well appearing, NAD, not cachetic  HEENT:  Nonicteric, PERRLA  CV:  RRR, S1S2   Lungs:  CTA B/L, no wheezes, rales, rhonchi  Abdomen:  Soft, non-tender, no distended, positive BS  Extremities:  2+ pulses, no c/c, no edema  Skin:  Warm and dry, no rashes  :  No wall  Neuro:  AAOx3, non-focal, grossly intact                                                                                                                                                                                                                                                                                                LABS:                               14.8   5.27  )-----------( 165      ( 29 Oct 2023 07:11 )             42.5                      10-29    136  |  x   |  x   ----------------------------<  x   x    |  x   |  x     Ca    9.5      29 Oct 2023 07:09  Mg     2.3     10-29    TPro  7.4  /  Alb  4.4  /  TBili  0.6  /  DBili  0.1  /  AST  25  /  ALT  29  /  AlkPhos  63  10-29                       RADIOLOGY & ADDITIONAL TESTS         I personally reviewed: [  ]EKG   [  ]CXR    [  ] CT      A/P:         Discussed with :     Priscila consultants' Notes   Time spent :

## 2023-10-30 ENCOUNTER — TRANSCRIPTION ENCOUNTER (OUTPATIENT)
Age: 68
End: 2023-10-30

## 2023-10-30 VITALS
RESPIRATION RATE: 18 BRPM | TEMPERATURE: 99 F | OXYGEN SATURATION: 94 % | DIASTOLIC BLOOD PRESSURE: 77 MMHG | SYSTOLIC BLOOD PRESSURE: 129 MMHG | HEART RATE: 69 BPM

## 2023-10-30 LAB
ANION GAP SERPL CALC-SCNC: 10 MMOL/L — SIGNIFICANT CHANGE UP (ref 5–17)
ANION GAP SERPL CALC-SCNC: 10 MMOL/L — SIGNIFICANT CHANGE UP (ref 5–17)
BUN SERPL-MCNC: 20 MG/DL — SIGNIFICANT CHANGE UP (ref 7–23)
BUN SERPL-MCNC: 20 MG/DL — SIGNIFICANT CHANGE UP (ref 7–23)
CALCIUM SERPL-MCNC: 9.8 MG/DL — SIGNIFICANT CHANGE UP (ref 8.4–10.5)
CALCIUM SERPL-MCNC: 9.8 MG/DL — SIGNIFICANT CHANGE UP (ref 8.4–10.5)
CHLORIDE SERPL-SCNC: 102 MMOL/L — SIGNIFICANT CHANGE UP (ref 96–108)
CHLORIDE SERPL-SCNC: 102 MMOL/L — SIGNIFICANT CHANGE UP (ref 96–108)
CO2 SERPL-SCNC: 26 MMOL/L — SIGNIFICANT CHANGE UP (ref 22–31)
CO2 SERPL-SCNC: 26 MMOL/L — SIGNIFICANT CHANGE UP (ref 22–31)
CREAT SERPL-MCNC: 1.23 MG/DL — SIGNIFICANT CHANGE UP (ref 0.5–1.3)
CREAT SERPL-MCNC: 1.23 MG/DL — SIGNIFICANT CHANGE UP (ref 0.5–1.3)
EGFR: 64 ML/MIN/1.73M2 — SIGNIFICANT CHANGE UP
EGFR: 64 ML/MIN/1.73M2 — SIGNIFICANT CHANGE UP
GLUCOSE SERPL-MCNC: 79 MG/DL — SIGNIFICANT CHANGE UP (ref 70–99)
GLUCOSE SERPL-MCNC: 79 MG/DL — SIGNIFICANT CHANGE UP (ref 70–99)
HCT VFR BLD CALC: 40.8 % — SIGNIFICANT CHANGE UP (ref 39–50)
HCT VFR BLD CALC: 40.8 % — SIGNIFICANT CHANGE UP (ref 39–50)
HGB BLD-MCNC: 14 G/DL — SIGNIFICANT CHANGE UP (ref 13–17)
HGB BLD-MCNC: 14 G/DL — SIGNIFICANT CHANGE UP (ref 13–17)
MCHC RBC-ENTMCNC: 29.1 PG — SIGNIFICANT CHANGE UP (ref 27–34)
MCHC RBC-ENTMCNC: 29.1 PG — SIGNIFICANT CHANGE UP (ref 27–34)
MCHC RBC-ENTMCNC: 34.3 GM/DL — SIGNIFICANT CHANGE UP (ref 32–36)
MCHC RBC-ENTMCNC: 34.3 GM/DL — SIGNIFICANT CHANGE UP (ref 32–36)
MCV RBC AUTO: 84.8 FL — SIGNIFICANT CHANGE UP (ref 80–100)
MCV RBC AUTO: 84.8 FL — SIGNIFICANT CHANGE UP (ref 80–100)
MRSA PCR RESULT.: SIGNIFICANT CHANGE UP
MRSA PCR RESULT.: SIGNIFICANT CHANGE UP
NRBC # BLD: 0 /100 WBCS — SIGNIFICANT CHANGE UP (ref 0–0)
NRBC # BLD: 0 /100 WBCS — SIGNIFICANT CHANGE UP (ref 0–0)
PLATELET # BLD AUTO: 170 K/UL — SIGNIFICANT CHANGE UP (ref 150–400)
PLATELET # BLD AUTO: 170 K/UL — SIGNIFICANT CHANGE UP (ref 150–400)
POTASSIUM SERPL-MCNC: 4.8 MMOL/L — SIGNIFICANT CHANGE UP (ref 3.5–5.3)
POTASSIUM SERPL-MCNC: 4.8 MMOL/L — SIGNIFICANT CHANGE UP (ref 3.5–5.3)
POTASSIUM SERPL-SCNC: 4.8 MMOL/L — SIGNIFICANT CHANGE UP (ref 3.5–5.3)
POTASSIUM SERPL-SCNC: 4.8 MMOL/L — SIGNIFICANT CHANGE UP (ref 3.5–5.3)
RBC # BLD: 4.81 M/UL — SIGNIFICANT CHANGE UP (ref 4.2–5.8)
RBC # BLD: 4.81 M/UL — SIGNIFICANT CHANGE UP (ref 4.2–5.8)
RBC # FLD: 13.8 % — SIGNIFICANT CHANGE UP (ref 10.3–14.5)
RBC # FLD: 13.8 % — SIGNIFICANT CHANGE UP (ref 10.3–14.5)
S AUREUS DNA NOSE QL NAA+PROBE: SIGNIFICANT CHANGE UP
S AUREUS DNA NOSE QL NAA+PROBE: SIGNIFICANT CHANGE UP
SODIUM SERPL-SCNC: 138 MMOL/L — SIGNIFICANT CHANGE UP (ref 135–145)
SODIUM SERPL-SCNC: 138 MMOL/L — SIGNIFICANT CHANGE UP (ref 135–145)
WBC # BLD: 6.78 K/UL — SIGNIFICANT CHANGE UP (ref 3.8–10.5)
WBC # BLD: 6.78 K/UL — SIGNIFICANT CHANGE UP (ref 3.8–10.5)
WBC # FLD AUTO: 6.78 K/UL — SIGNIFICANT CHANGE UP (ref 3.8–10.5)
WBC # FLD AUTO: 6.78 K/UL — SIGNIFICANT CHANGE UP (ref 3.8–10.5)

## 2023-10-30 RX ORDER — RIVAROXABAN 15 MG-20MG
1 KIT ORAL
Qty: 30 | Refills: 0
Start: 2023-10-30 | End: 2023-11-28

## 2023-10-30 RX ADMIN — CITALOPRAM 20 MILLIGRAM(S): 10 TABLET, FILM COATED ORAL at 12:35

## 2023-10-30 RX ADMIN — Medication 75 MICROGRAM(S): at 06:03

## 2023-10-30 RX ADMIN — CHLORHEXIDINE GLUCONATE 1 APPLICATION(S): 213 SOLUTION TOPICAL at 12:36

## 2023-10-30 RX ADMIN — SODIUM CHLORIDE 1 GRAM(S): 9 INJECTION INTRAMUSCULAR; INTRAVENOUS; SUBCUTANEOUS at 06:03

## 2023-10-30 RX ADMIN — PANTOPRAZOLE SODIUM 40 MILLIGRAM(S): 20 TABLET, DELAYED RELEASE ORAL at 06:03

## 2023-10-30 NOTE — DISCHARGE NOTE PROVIDER - NSDCMRMEDTOKEN_GEN_ALL_CORE_FT
citalopram 20 mg oral tablet: 1 tab(s) orally once a day  desmopressin nasal 0.01% spray (obsolete): 1 spray(s) nasal once a day (at bedtime)  levothyroxine 75 mcg (0.075 mg) oral tablet: 1 tab(s) orally once a day  multivitamin: 1 tab(s) orally once a day  natures bounty probiotic: 1 tab(s) orally once a day  omeprazole 20 mg oral delayed release capsule: 1 cap(s) orally once a day  sodium chloride 1 g oral tablet: 1 tab(s) orally once a day as needed  tamsulosin 0.4 mg oral capsule: 2 cap(s) orally once a day (at bedtime)  vitamin b 12: 1 tab(s) orally once a day  vitamin c: 1 tab(s) orally once a day  Vitamin D3 25 mcg (1000 intl units) oral tablet: 1 tab(s) orally once a day  Xarelto 10 mg oral tablet: 1 tab(s) orally once a day For covid -ppx

## 2023-10-30 NOTE — PROGRESS NOTE ADULT - SUBJECTIVE AND OBJECTIVE BOX
Overnight events noted      VITAL:  T(C): , Max: 36.8 (10-29-23 @ 16:04)  T(F): , Max: 98.2 (10-29-23 @ 16:04)  HR: 63 (10-30-23 @ 00:33)  BP: 114/64 (10-30-23 @ 00:33)  BP(mean): --  RR: 18 (10-30-23 @ 00:33)  SpO2: 96% (10-30-23 @ 00:33)  Wt(kg): --      PHYSICAL EXAM:  deferred in accordance with current standards limiting patient contact      LABS:                        14.8   5.27  )-----------( 165      ( 29 Oct 2023 07:11 )             42.5     Na(136)/K(--)/Cl(--)/HCO3(--)/BUN(--)/Cr(--)Glu(--)/Ca(--)/Mg(--)/PO4(--)    10-29 @ 16:06  Na(136)/K(4.6)/Cl(102)/HCO3(23)/BUN(18)/Cr(1.26)Glu(92)/Ca(9.5)/Mg(2.3)/PO4(--)    10-29 @ 07:09  Na(132)/K(4.7)/Cl(98)/HCO3(22)/BUN(21)/Cr(1.42)Glu(103)/Ca(9.7)/Mg(--)/PO4(--)    10-28 @ 18:57  Na(130)/K(--)/Cl(--)/HCO3(--)/BUN(--)/Cr(1.13)Glu(--)/Ca(--)/Mg(--)/PO4(--)    10-28 @ 14:22  Na(127)/K(4.8)/Cl(92)/HCO3(25)/BUN(15)/Cr(1.00)Glu(89)/Ca(9.0)/Mg(--)/PO4(--)    10-28 @ 07:12  Na(121)/K(4.2)/Cl(90)/HCO3(21)/BUN(21)/Cr(0.90)Glu(94)/Ca(8.9)/Mg(--)/PO4(--)    10-27 @ 12:25    Urinalysis Basic - ( 29 Oct 2023 07:09 )  Color: x / Appearance: x / SG: x / pH: x  Gluc: 92 mg/dL / Ketone: x  / Bili: x / Urobili: x   Blood: x / Protein: x / Nitrite: x   Leuk Esterase: x / RBC: x / WBC x   Sq Epi: x / Non Sq Epi: x / Bacteria: x  Osmolality, Random Urine: 124 mos/kg (10-28 @ 18:57)  Osmolality, Random Urine: 94 mos/kg (10-28 @ 14:22)    IMPRESSION: 67M w/ pituitary adenoma/associated DI, and diverticulitis s/p colectomy, 10/27/23 p/w COVID illness and hyponatremia    (1)Hyponatremia - serum sodium down to 121 on admission - due to DDAVP + excess water intake - now resolved, on salt tabs, free water restriction, and off DDAVP    (2)Polyuria -           Tom Bauman MD  [facility]  Office/on call physician: [phone]  Cell (7a-7p): [phone]       No pain, no sob  Minimal nocturia last night        VITAL:  T(C): , Max: 36.8 (10-29-23 @ 16:04)  T(F): , Max: 98.2 (10-29-23 @ 16:04)  HR: 63 (10-30-23 @ 00:33)  BP: 114/64 (10-30-23 @ 00:33)  BP(mean): --  RR: 18 (10-30-23 @ 00:33)  SpO2: 96% (10-30-23 @ 00:33)  Wt(kg): --      PHYSICAL EXAM:  Constitutional: NAD, Alert  HEENT: NCAT, DMM  Neck: Supple, No JVD  Respiratory: CTA-b/l  Cardiovascular: RRR s1s2, no m/r/g  Gastrointestinal: BS+, soft, NT/ND  Extremities: No peripheral edema b/l  Neurological: no focal deficits; strength grossly intact  Back: no CVAT b/l  Skin: No rashes, no nevi      LABS:                        14.8   5.27  )-----------( 165      ( 29 Oct 2023 07:11 )             42.5     Na(136)/K(--)/Cl(--)/HCO3(--)/BUN(--)/Cr(--)Glu(--)/Ca(--)/Mg(--)/PO4(--)    10-29 @ 16:06  Na(136)/K(4.6)/Cl(102)/HCO3(23)/BUN(18)/Cr(1.26)Glu(92)/Ca(9.5)/Mg(2.3)/PO4(--)    10-29 @ 07:09  Na(132)/K(4.7)/Cl(98)/HCO3(22)/BUN(21)/Cr(1.42)Glu(103)/Ca(9.7)/Mg(--)/PO4(--)    10-28 @ 18:57  Na(130)/K(--)/Cl(--)/HCO3(--)/BUN(--)/Cr(1.13)Glu(--)/Ca(--)/Mg(--)/PO4(--)    10-28 @ 14:22  Na(127)/K(4.8)/Cl(92)/HCO3(25)/BUN(15)/Cr(1.00)Glu(89)/Ca(9.0)/Mg(--)/PO4(--)    10-28 @ 07:12  Na(121)/K(4.2)/Cl(90)/HCO3(21)/BUN(21)/Cr(0.90)Glu(94)/Ca(8.9)/Mg(--)/PO4(--)    10-27 @ 12:25    Urinalysis Basic - ( 29 Oct 2023 07:09 )  Color: x / Appearance: x / SG: x / pH: x  Gluc: 92 mg/dL / Ketone: x  / Bili: x / Urobili: x   Blood: x / Protein: x / Nitrite: x   Leuk Esterase: x / RBC: x / WBC x   Sq Epi: x / Non Sq Epi: x / Bacteria: x  Osmolality, Random Urine: 124 mos/kg (10-28 @ 18:57)  Osmolality, Random Urine: 94 mos/kg (10-28 @ 14:22)    IMPRESSION: 67M w/ pituitary adenoma/associated DI, and diverticulitis s/p colectomy, 10/27/23 p/w COVID illness and hyponatremia    (1)Hyponatremia - serum sodium down to 121 on admission - due to DDAVP + excess water intake - now resolved, on salt tabs, free water restriction, and off DDAVP    (2)Polyuria - chronic issue in association with his central DI. Tends not to be an issue for him when he is ill; becomes more of a problem for him when he is healthy. He shares that he has had DI for ~30 years, and that he is comfortable determining when at home he should restart his DDAVP.    (3)COVID - improved      RECOMMEND:  (1)Can continue NaCl tabs as ordered  (2)No renal objection to discharge; patient can use his discretion after discharge to determine when to restart his DDAVP spray  (3)Can f/u at my office in 1-2 months      Tom Bauman MD  [facility]  Office/on call physician: [phone]  Cell (7a-7p): [phone]

## 2023-10-30 NOTE — DISCHARGE NOTE NURSING/CASE MANAGEMENT/SOCIAL WORK - NSDCPEFALRISK_GEN_ALL_CORE
For information on Fall & Injury Prevention, visit: [email or web address] OR  [email or web address] OR  https://www.cdc.gov/steadi/patient.html

## 2023-10-30 NOTE — DISCHARGE NOTE PROVIDER - CARE PROVIDERS DIRECT ADDRESSES
[email or web address] ,[email or web address],DirectAddress_Unknown ,[email or web address],DirectAddress_Unknown

## 2023-10-30 NOTE — DISCHARGE NOTE PROVIDER - HOSPITAL COURSE
HPI:  67-year-old male past medical history of diabetes insipidus secondary to pituitary adenoma on desmopressin oral spray, recurrent diverticulitis status post colectomy, reports going on a fishing trip a week prior following which had COVID infection.  Currently presenting to the ED complaining of lethargy, shaky legs for the past 2 days.    	Patient reports using his desmopressin overnight, did not spray 1 day prior, has been taking normal fluid intake, does not report any urinary complaints/dilution, uses desmopressin spray twice today following which developed polyuria.    	Patient work-up indicated hyponatremia to 122, and patient was sent to the ED for further evaluation.    Also reports shortness of breath on exertion, nonproductive cough, back pain.  Is compliant with his medications.   (27 Oct 2023 16:08)    Hospital Course: Pt presenting with hyponatremia and COVID illness. Pt serum sodium down to 121 on admission Renal following ->likely due to DDAVP + excess water intake -  now resolved, on salt tabs, free water restriction, and off DDAVP. Pt also with Polyuria - chronic issue in association with his central DI. Tends not to be an issue for him when he is ill; becomes more of a problem for him when he is healthy. He shares that he has had DI for ~30 years, and that he is comfortable determining when at home he should restart his DDAVP. Of note pt with +Covid->  molupirnavir - s/p 5 days as outpt - ID following ->Pt within time frame to get remdesivir to prevent progression but pt refused , steroids not indicated pt not hypoxic, cxr clear - no antibx indicated. Pt is currently on room air. Pt is now medically stable for discharge home.         Important Medication Changes and Reason: DDAVP - resume at your discretion     Active or Pending Issues Requiring Follow-up: Outpt follow up with     Advanced Directives:   [x ] Full code  [ ] DNR  [ ] Hospice    Discharge Diagnoses:  Covid   Diabetes insipidus secondary to pituitary adenoma   recurrent diverticulitis status post colectomy

## 2023-10-30 NOTE — PROGRESS NOTE ADULT - ASSESSMENT
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  
The patient is a 67y Male complaining of Weakness for 3 days    Hyponatremia:    Likely from DI  Endo eval appreciated  NaCL tab  Cw Synthroid    MDD:    Cw Celexa    Covid +:    ID eval appreciated  Pt refused RDV
The patient is a 67y Male complaining of Weakness for 3 days    Hyponatremia:    Likely from DI  Endo eval appreciated  NaCL tab  Cw Synthroid    MDD:    Cw Celexa    Covid +:    ID eval appreciated  still refusing remdesivir     monitor and if stable dc home in 24 hr 
The patient is a 67y Male complaining of Weakness for 3 days    Hyponatremia:    Likely from DI  Endo eval appreciated  NaCL tab  Cw Synthroid    MDD:    Cw Celexa    Covid +:    ID eval appreciated  still refusing remdesivir     stable for dc   discussed w pt and np 
66 y/o M w/ hx of nonfunctioning pituitary macroadenoma s/p TSR x 3 with recurrence with postoperative central DI and central hypothyroidisim presents to ED for hypoantremia.    #Hyponatremia  Likely 2/2 SIADH (high urine Osm and Urine Na) - patient with central diabetes insipidus so would not expect any ADH production, however, in times of illness patient may be producing ADH which can cause this recurrent hyponatremia. Can also be dehydration with decreased po intake  - AM cortisol 10  PLAN:  - hold DDAVP for now. Monitor urine osm, urine output, and serum sodium. Currently without signs or symptoms of DI.  - fluid restriction 1L  -Monitor on NaCl 1g BID  - given we are holding DDAVP, we may expect increased urine output  *DI WATCH*:  - Goal Na 140-145  - Please monitor strict I/O  - DI is suspected if UOP is >250cc/hr x 2 consecutive hours or if >500cc/hr x 1 hr - if this occurs please check STAT serum Na, urine osm, urine specific gravity  - if specific gravity <1.005/Urine Osm <300 and Na >140- likely DI, please dose DDAVP 0.05mg PO x1 and continue DI watch.     #Secondary Hypothyroidism  10/26/23: FT4 1.3  -c/w home levothyroxine 75mcg daily      Silvano Palmer D.O  [phone]

## 2023-10-30 NOTE — DISCHARGE NOTE PROVIDER - CARE PROVIDER_API CALL
Tom Bauman  Nephrology  [address]  [place] [address]  Phone: [phone]  Fax: [phone]  Follow Up Time:     Yobany Ng  Internal Medicine  [address] [place] [address]  Phone: [phone]  Fax: [phone]  Follow Up Time:    Tom Bauman  Nephrology  [address]  [place] [address]  Phone: [phone]  Fax: [phone]  Follow Up Time:     Yobany Ng  Internal Medicine  [address] [place] [address]  Phone: [phone]  Fax: [phone]  Follow Up Time:    Tom Bauman  Nephrology  [address]  [place] [address]  Phone: [phone]  Fax: [phone]  Follow Up Time:     Yobany Ng  Internal Medicine  [address] [place] [address]  Phone: [phone]  Fax: [phone]  Follow Up Time:

## 2023-10-30 NOTE — DISCHARGE NOTE NURSING/CASE MANAGEMENT/SOCIAL WORK - PATIENT PORTAL LINK FT
You can access the FollowMyHealth Patient Portal offered by [facility] by registering at the following website: [email or web address]. By joining [employer]’s FollowMyHealth portal, you will also be able to view your health information using other applications (apps) compatible with our system.

## 2023-10-30 NOTE — DISCHARGE NOTE PROVIDER - NSDCCPCAREPLAN_GEN_ALL_CORE_FT
PRINCIPAL DISCHARGE DIAGNOSIS  Diagnosis: Hyponatremia  Assessment and Plan of Treatment: Your  serum sodium down to 121 on admission Renal following ->likely due to DDAVP + excess water intake -  now resolved, on salt tabs, free water restriction, and off DDAVP. Pt also with Polyuria - chronic issue in association with his central DI.  Pt  shares that he has had DI for ~30 years, and that he is comfortable determining when at home he should restart his DDAVP.  Please follow up with your Nephrologist in 1-2 months      SECONDARY DISCHARGE DIAGNOSES  Diagnosis: 2019 novel coronavirus disease (COVID-19)  Assessment and Plan of Treatment: You tested positive for COVID 19.  You no longer require hospitalization.  Please restrict activities outside of your home except for getting medical care.  Do not go to work, school, or public areas.  Avoid using public transportation, ride-sharing, or taxis.  Separate yourself from other people and animals in your home.  Call ahead before visiting your doctor.  Wear a facemask when you are around other people. Cover your cough and sneezes.  Clean your hands often.  Avoid sharing personal household items.  Clean all frequently touched a surfaces daily.

## 2023-10-30 NOTE — DISCHARGE NOTE NURSING/CASE MANAGEMENT/SOCIAL WORK - NSTOBACCONEVERSMOKERY/N_GEN_A
58 y/o Male w/ PMHx of Anemia, Lupus, lung fibrosis, Asthma, HLD, COVID, s/p lap cholecystectomy on 6/24/21 ,s/p ERCP sphincterotomy and stone extraction 06/25/21  with epigastric pain, described as sharp but denies nausea, vomiting, fever, chills or any other complaints . Pt was not discharged with pain meds . Afebrile , labs are wnl . Presently pain is a 3/10 on pain scale.  Appears Postop incisional pain     No immediate surgical intervention warranted   Pain control   f/up labs   f/up outpt with Dr Gomez as planned   
Yes

## 2023-10-30 NOTE — PROGRESS NOTE ADULT - SUBJECTIVE AND OBJECTIVE BOX
Date of service: 10-30-23 @ 14:13      Patient is a 67y old  Male who presents with a chief complaint of The patient is a 67y Male complaining of Weakness for 3 days (30 Oct 2023 11:20)                                                               INTERVAL HPI/OVERNIGHT EVENTS:    REVIEW OF SYSTEMS:    feels well                                                                                                                                                                                                                                                                                  Medications:  MEDICATIONS  (STANDING):  chlorhexidine 2% Cloths 1 Application(s) Topical daily  citalopram 20 milliGRAM(s) Oral daily  enoxaparin Injectable 40 milliGRAM(s) SubCutaneous every 24 hours  influenza  Vaccine (HIGH DOSE) 0.7 milliLiter(s) IntraMuscular once  levothyroxine 75 MICROGram(s) Oral daily  pantoprazole    Tablet 40 milliGRAM(s) Oral before breakfast  remdesivir  IVPB   IV Intermittent   remdesivir  IVPB 100 milliGRAM(s) IV Intermittent every 24 hours  sodium chloride 1 Gram(s) Oral every 12 hours  tamsulosin 0.8 milliGRAM(s) Oral at bedtime    MEDICATIONS  (PRN):       Allergies    No Known Allergies    Intolerances      Vital Signs Last 24 Hrs  T(C): 37.1 (30 Oct 2023 10:42), Max: 37.1 (30 Oct 2023 10:42)  T(F): 98.7 (30 Oct 2023 10:42), Max: 98.7 (30 Oct 2023 10:42)  HR: 69 (30 Oct 2023 10:42) (54 - 69)  BP: 129/77 (30 Oct 2023 10:42) (113/64 - 129/77)  BP(mean): --  RR: 18 (30 Oct 2023 10:42) (18 - 18)  SpO2: 94% (30 Oct 2023 10:42) (94% - 96%)    Parameters below as of 30 Oct 2023 10:42  Patient On (Oxygen Delivery Method): room air      CAPILLARY BLOOD GLUCOSE          10-29 @ 07:01  -  10-30 @ 07:00  --------------------------------------------------------  IN: 240 mL / OUT: 1625 mL / NET: -1385 mL      Physical Exam:    Daily     Daily   General:  Well appearing, NAD, not cachetic  HEENT:  Nonicteric, PERRLA  CV:  RRR, S1S2   Lungs:  CTA B/L, no wheezes, rales, rhonchi  Abdomen:  Soft, non-tender, no distended, positive BS  Extremities: no edema                                                                                                                                                                                                                                                                                         LABS:                               14.0   6.78  )-----------( 170      ( 30 Oct 2023 09:37 )             40.8                      10-30    138  |  102  |  20  ----------------------------<  79  4.8   |  26  |  1.23    Ca    9.8      30 Oct 2023 09:37  Mg     2.3     10-29    TPro  7.4  /  Alb  4.4  /  TBili  0.6  /  DBili  0.1  /  AST  25  /  ALT  29  /  AlkPhos  63  10-29                       RADIOLOGY & ADDITIONAL TESTS         I personally reviewed: [  ]EKG   [  ]CXR    [  ] CT      A/P:         Discussed with :     Priscila consultants' Notes   Time spent :

## 2023-10-30 NOTE — PROGRESS NOTE ADULT - REASON FOR ADMISSION
The patient is a 67y Male complaining of Weakness for 3 days

## 2023-11-03 LAB
ALBUMIN SERPL ELPH-MCNC: 4.7 G/DL
ALP BLD-CCNC: 63 U/L
ALT SERPL-CCNC: 32 U/L
ANION GAP SERPL CALC-SCNC: 11 MMOL/L
AST SERPL-CCNC: 26 U/L
BILIRUB SERPL-MCNC: 0.6 MG/DL
BUN SERPL-MCNC: 20 MG/DL
CALCIUM SERPL-MCNC: 9.6 MG/DL
CHLORIDE SERPL-SCNC: 103 MMOL/L
CO2 SERPL-SCNC: 25 MMOL/L
CREAT SERPL-MCNC: 1.23 MG/DL
EGFR: 64 ML/MIN/1.73M2
GLUCOSE SERPL-MCNC: 76 MG/DL
OSMOLALITY UR: 594 MOSM/KG
POTASSIUM SERPL-SCNC: 4 MMOL/L
PROT SERPL-MCNC: 7.1 G/DL
SODIUM SERPL-SCNC: 140 MMOL/L

## 2023-11-13 PROCEDURE — 71045 X-RAY EXAM CHEST 1 VIEW: CPT

## 2023-11-13 PROCEDURE — 84443 ASSAY THYROID STIM HORMONE: CPT

## 2023-11-13 PROCEDURE — 86140 C-REACTIVE PROTEIN: CPT

## 2023-11-13 PROCEDURE — 85027 COMPLETE CBC AUTOMATED: CPT

## 2023-11-13 PROCEDURE — 80076 HEPATIC FUNCTION PANEL: CPT

## 2023-11-13 PROCEDURE — 80048 BASIC METABOLIC PNL TOTAL CA: CPT

## 2023-11-13 PROCEDURE — 84300 ASSAY OF URINE SODIUM: CPT

## 2023-11-13 PROCEDURE — 81003 URINALYSIS AUTO W/O SCOPE: CPT

## 2023-11-13 PROCEDURE — 82533 TOTAL CORTISOL: CPT

## 2023-11-13 PROCEDURE — 85610 PROTHROMBIN TIME: CPT

## 2023-11-13 PROCEDURE — 84484 ASSAY OF TROPONIN QUANT: CPT

## 2023-11-13 PROCEDURE — 85379 FIBRIN DEGRADATION QUANT: CPT

## 2023-11-13 PROCEDURE — 82565 ASSAY OF CREATININE: CPT

## 2023-11-13 PROCEDURE — 83935 ASSAY OF URINE OSMOLALITY: CPT

## 2023-11-13 PROCEDURE — 87640 STAPH A DNA AMP PROBE: CPT

## 2023-11-13 PROCEDURE — 83735 ASSAY OF MAGNESIUM: CPT

## 2023-11-13 PROCEDURE — 84295 ASSAY OF SERUM SODIUM: CPT

## 2023-11-13 PROCEDURE — 87641 MR-STAPH DNA AMP PROBE: CPT

## 2023-11-13 PROCEDURE — 36415 COLL VENOUS BLD VENIPUNCTURE: CPT

## 2023-11-13 PROCEDURE — 84439 ASSAY OF FREE THYROXINE: CPT

## 2023-11-13 PROCEDURE — 85025 COMPLETE CBC W/AUTO DIFF WBC: CPT

## 2023-11-13 PROCEDURE — 82553 CREATINE MB FRACTION: CPT

## 2023-11-13 PROCEDURE — 81001 URINALYSIS AUTO W/SCOPE: CPT

## 2023-11-13 PROCEDURE — 80053 COMPREHEN METABOLIC PANEL: CPT

## 2023-11-13 PROCEDURE — 99285 EMERGENCY DEPT VISIT HI MDM: CPT

## 2023-11-13 PROCEDURE — 83930 ASSAY OF BLOOD OSMOLALITY: CPT

## 2023-11-13 PROCEDURE — 87637 SARSCOV2&INF A&B&RSV AMP PRB: CPT

## 2023-11-20 ENCOUNTER — APPOINTMENT (OUTPATIENT)
Dept: INTERNAL MEDICINE | Facility: CLINIC | Age: 68
End: 2023-11-20
Payer: MEDICARE

## 2023-11-20 ENCOUNTER — OUTPATIENT (OUTPATIENT)
Dept: OUTPATIENT SERVICES | Facility: HOSPITAL | Age: 68
LOS: 1 days | End: 2023-11-20
Payer: MEDICARE

## 2023-11-20 ENCOUNTER — RESULT REVIEW (OUTPATIENT)
Age: 68
End: 2023-11-20

## 2023-11-20 ENCOUNTER — MED ADMIN CHARGE (OUTPATIENT)
Age: 68
End: 2023-11-20

## 2023-11-20 VITALS
OXYGEN SATURATION: 98 % | DIASTOLIC BLOOD PRESSURE: 70 MMHG | SYSTOLIC BLOOD PRESSURE: 118 MMHG | BODY MASS INDEX: 27.09 KG/M2 | HEIGHT: 72 IN | HEART RATE: 75 BPM | WEIGHT: 200 LBS

## 2023-11-20 DIAGNOSIS — Z98.890 OTHER SPECIFIED POSTPROCEDURAL STATES: Chronic | ICD-10-CM

## 2023-11-20 DIAGNOSIS — Z00.00 ENCOUNTER FOR GENERAL ADULT MEDICAL EXAMINATION WITHOUT ABNORMAL FINDINGS: ICD-10-CM

## 2023-11-20 DIAGNOSIS — D35.2 BENIGN NEOPLASM OF PITUITARY GLAND: Chronic | ICD-10-CM

## 2023-11-20 DIAGNOSIS — K57.32 DIVERTICULITIS OF LARGE INTESTINE W/OUT PERFORATION OR ABSCESS W/OUT BLEEDING: ICD-10-CM

## 2023-11-20 DIAGNOSIS — E03.9 HYPOTHYROIDISM, UNSPECIFIED: ICD-10-CM

## 2023-11-20 DIAGNOSIS — D35.2 BENIGN NEOPLASM OF PITUITARY GLAND: ICD-10-CM

## 2023-11-20 DIAGNOSIS — I10 ESSENTIAL (PRIMARY) HYPERTENSION: ICD-10-CM

## 2023-11-20 DIAGNOSIS — E23.2 DIABETES INSIPIDUS: ICD-10-CM

## 2023-11-20 DIAGNOSIS — Z23 ENCOUNTER FOR IMMUNIZATION: ICD-10-CM

## 2023-11-20 DIAGNOSIS — K57.32 DIVERTICULITIS OF LARGE INTESTINE WITHOUT PERFORATION OR ABSCESS WITHOUT BLEEDING: ICD-10-CM

## 2023-11-20 DIAGNOSIS — R53.83 OTHER FATIGUE: ICD-10-CM

## 2023-11-20 PROCEDURE — G0439: CPT

## 2023-11-20 PROCEDURE — 99397 PER PM REEVAL EST PAT 65+ YR: CPT | Mod: GC,GY

## 2023-11-20 RX ORDER — METRONIDAZOLE 500 MG/1
500 TABLET ORAL
Qty: 3 | Refills: 0 | Status: DISCONTINUED | COMMUNITY
Start: 2023-03-08 | End: 2023-11-20

## 2023-11-20 RX ORDER — METRONIDAZOLE 500 MG/1
500 TABLET ORAL
Qty: 30 | Refills: 0 | Status: DISCONTINUED | COMMUNITY
Start: 2023-03-08 | End: 2023-11-20

## 2023-11-20 RX ORDER — CIPROFLOXACIN HYDROCHLORIDE 500 MG/1
500 TABLET, FILM COATED ORAL TWICE DAILY
Qty: 20 | Refills: 0 | Status: DISCONTINUED | COMMUNITY
Start: 2023-03-08 | End: 2023-11-20

## 2023-11-20 RX ORDER — CIPROFLOXACIN HYDROCHLORIDE 500 MG/1
500 TABLET, FILM COATED ORAL
Qty: 2 | Refills: 0 | Status: DISCONTINUED | COMMUNITY
Start: 2023-03-08 | End: 2023-11-20

## 2023-12-08 ENCOUNTER — OUTPATIENT (OUTPATIENT)
Dept: OUTPATIENT SERVICES | Facility: HOSPITAL | Age: 68
LOS: 1 days | End: 2023-12-08
Payer: MEDICARE

## 2023-12-08 ENCOUNTER — APPOINTMENT (OUTPATIENT)
Dept: ULTRASOUND IMAGING | Facility: CLINIC | Age: 68
End: 2023-12-08
Payer: MEDICARE

## 2023-12-08 DIAGNOSIS — Z98.890 OTHER SPECIFIED POSTPROCEDURAL STATES: Chronic | ICD-10-CM

## 2023-12-08 DIAGNOSIS — E05.90 THYROTOXICOSIS, UNSPECIFIED WITHOUT THYROTOXIC CRISIS OR STORM: ICD-10-CM

## 2023-12-08 DIAGNOSIS — D35.2 BENIGN NEOPLASM OF PITUITARY GLAND: Chronic | ICD-10-CM

## 2023-12-08 PROCEDURE — 76536 US EXAM OF HEAD AND NECK: CPT

## 2023-12-08 PROCEDURE — 76536 US EXAM OF HEAD AND NECK: CPT | Mod: 26

## 2023-12-14 ENCOUNTER — TRANSCRIPTION ENCOUNTER (OUTPATIENT)
Age: 68
End: 2023-12-14

## 2023-12-27 ENCOUNTER — APPOINTMENT (OUTPATIENT)
Dept: INTERNAL MEDICINE | Facility: CLINIC | Age: 68
End: 2023-12-27

## 2024-02-22 ENCOUNTER — APPOINTMENT (OUTPATIENT)
Dept: MRI IMAGING | Facility: CLINIC | Age: 69
End: 2024-02-22
Payer: MEDICARE

## 2024-02-22 ENCOUNTER — OUTPATIENT (OUTPATIENT)
Dept: OUTPATIENT SERVICES | Facility: HOSPITAL | Age: 69
LOS: 1 days | End: 2024-02-22
Payer: MEDICARE

## 2024-02-22 DIAGNOSIS — D35.2 BENIGN NEOPLASM OF PITUITARY GLAND: ICD-10-CM

## 2024-02-22 DIAGNOSIS — D35.2 BENIGN NEOPLASM OF PITUITARY GLAND: Chronic | ICD-10-CM

## 2024-02-22 DIAGNOSIS — Z98.890 OTHER SPECIFIED POSTPROCEDURAL STATES: Chronic | ICD-10-CM

## 2024-02-22 PROCEDURE — 70553 MRI BRAIN STEM W/O & W/DYE: CPT

## 2024-02-22 PROCEDURE — A9585: CPT

## 2024-02-22 PROCEDURE — 70553 MRI BRAIN STEM W/O & W/DYE: CPT | Mod: 26,MH

## 2024-03-11 ENCOUNTER — APPOINTMENT (OUTPATIENT)
Dept: ENDOCRINOLOGY | Facility: CLINIC | Age: 69
End: 2024-03-11
Payer: MEDICARE

## 2024-03-11 VITALS
SYSTOLIC BLOOD PRESSURE: 132 MMHG | HEART RATE: 79 BPM | OXYGEN SATURATION: 100 % | DIASTOLIC BLOOD PRESSURE: 79 MMHG | BODY MASS INDEX: 27.09 KG/M2 | HEIGHT: 72 IN | WEIGHT: 200 LBS

## 2024-03-11 DIAGNOSIS — D35.2 BENIGN NEOPLASM OF PITUITARY GLAND: ICD-10-CM

## 2024-03-11 DIAGNOSIS — E03.9 HYPOTHYROIDISM, UNSPECIFIED: ICD-10-CM

## 2024-03-11 PROCEDURE — G2211 COMPLEX E/M VISIT ADD ON: CPT

## 2024-03-11 PROCEDURE — 99214 OFFICE O/P EST MOD 30 MIN: CPT

## 2024-03-11 NOTE — HISTORY OF PRESENT ILLNESS
[FreeTextEntry1] : 67 y/o M w/ hx of nonfunctioning pituitary macroadenoma dx in 1991 s/p transsphenoidal hypophysectomy by Dr. Tevin De La Vega at [facility] 5/1991. Was managed by Dr. Prashanth Trujillo. Pituitary function remained intact. There was regrowth on the right side in 1997 s/p 2nd TSR by Dr. De La Vega 5/1997. Post-op he developed DI which has persisted since then currently on DDAVP spray at night with hx of hyponatremia and labile sodium levels especially with infection or illness. Has tried vasopressin tablets but prefers nasal spray. Has been drinking more. Now taking DDAVP spray BID with improvement in symptoms.  MRI surveillance has revealed stability with residual pituitary tissue. In 2007, there was some growth with 3rd TSR with [facility] in 2007. Since 2007 MRI has remained stable and repeat anterior pituitary function has remained intact. He denies any weight changes other than some weight loss s/p diverticulitis surgery, diabetes, hypertension, easy bruising, dizziness, lightheadedness, nausea, vomiting, change in appetite.   In April 2021 he had an episode of hyperthyroidism likely thyroiditis s/p COVID vaccine with resolution. Treated briefly with methimazole and tapered off 10/2021. Thyroid antibodies were normal. Had been chemically euthyroid until 10/2023 with low Free T4 confirmed by Free T4 by equilibrium dialysis. Now on levothyroxine 75 mcg daily with adherence. Waits a few hours to take omeprazole. Feels a little anxious. Also on Celexa.

## 2024-03-11 NOTE — REASON FOR VISIT
[Follow - Up] : a follow-up visit [Hyperthyroidism] : hyperthyroidism [Pituitary Evaluation/ Disorder] : pituitary evaluation/disorder

## 2024-03-11 NOTE — DATA REVIEWED
[FreeTextEntry1] : Labs 3/2024: CBC normal Sodium 133 TSH 2.15  Labs 11/2023: CMP normal Osm 594   Labs 9/27/2023: CBC normal A1c 5.6%

## 2024-03-11 NOTE — REVIEW OF SYSTEMS
[Tremors] : tremors [All other systems negative] : All other systems negative [Fatigue] : no fatigue [Recent Weight Gain (___ Lbs)] : no recent weight gain [Visual Field Defect] : no visual field defect [Dysphagia] : no dysphagia [Recent Weight Loss (___ Lbs)] : no recent weight loss [Dysphonia] : no dysphonia [Chest Pain] : no chest pain [Palpitations] : no palpitations [Polyuria] : no polyuria [Shortness Of Breath] : no shortness of breath [Nausea] : no nausea [Easy Bruising] : no tendency for easy bruising

## 2024-03-11 NOTE — PHYSICAL EXAM
[Well Nourished] : well nourished [Alert] : alert [Healthy Appearance] : healthy appearance [No Acute Distress] : no acute distress [Well Developed] : well developed [EOMI] : extra ocular movement intact [Supple] : the neck was supple [Normal Hearing] : hearing was normal [Thyroid Not Enlarged] : the thyroid was not enlarged [No Respiratory Distress] : no respiratory distress [No Accessory Muscle Use] : no accessory muscle use [Clear to Auscultation] : lungs were clear to auscultation bilaterally [Normal Rate and Effort] : normal respiratory rate and effort [Normal S1, S2] : normal S1 and S2 [Normal Rate] : heart rate was normal [Regular Rhythm] : with a regular rhythm [No Edema] : no peripheral edema [Not Tender] : non-tender [Normal Bowel Sounds] : normal bowel sounds [Soft] : abdomen soft [Not Distended] : not distended [No Clubbing, Cyanosis] : no clubbing  or cyanosis of the fingernails [Normal Strength/Tone] : muscle strength and tone were normal [Normal Affect] : the affect was normal [Oriented x3] : oriented to person, place, and time [Normal Mood] : the mood was normal [de-identified] : +involuntary LE movements

## 2024-03-12 DIAGNOSIS — E23.2 DIABETES INSIPIDUS: ICD-10-CM

## 2024-03-12 LAB
ALBUMIN SERPL ELPH-MCNC: 4.5 G/DL
ALP BLD-CCNC: 66 U/L
ALT SERPL-CCNC: 28 U/L
ANION GAP SERPL CALC-SCNC: 8 MMOL/L
AST SERPL-CCNC: 26 U/L
BILIRUB SERPL-MCNC: 0.5 MG/DL
BUN SERPL-MCNC: 21 MG/DL
CALCIUM SERPL-MCNC: 9.1 MG/DL
CHLORIDE SERPL-SCNC: 97 MMOL/L
CO2 SERPL-SCNC: 26 MMOL/L
CORTIS SERPL-MCNC: 6.3 UG/DL
CREAT SERPL-MCNC: 1.15 MG/DL
EGFR: 69 ML/MIN/1.73M2
FSH SERPL-MCNC: 4.9 IU/L
GLUCOSE SERPL-MCNC: 90 MG/DL
LH SERPL-ACNC: 3.9 IU/L
OSMOLALITY UR: 882 MOSM/KG
POTASSIUM SERPL-SCNC: 4.8 MMOL/L
PROLACTIN SERPL-MCNC: 7.2 NG/ML
PROT SERPL-MCNC: 7 G/DL
PSA SERPL-MCNC: 1.32 NG/ML
SODIUM SERPL-SCNC: 131 MMOL/L
T4 FREE SERPL-MCNC: 1.3 NG/DL
TESTOST SERPL-MCNC: 621 NG/DL

## 2024-08-03 ENCOUNTER — APPOINTMENT (OUTPATIENT)
Dept: ORTHOPEDIC SURGERY | Facility: CLINIC | Age: 69
End: 2024-08-03
Payer: MEDICARE

## 2024-08-03 VITALS — BODY MASS INDEX: 27.09 KG/M2 | WEIGHT: 200 LBS | HEIGHT: 72 IN

## 2024-08-03 DIAGNOSIS — M43.16 SPONDYLOLISTHESIS, LUMBAR REGION: ICD-10-CM

## 2024-08-03 DIAGNOSIS — M51.36 OTHER INTERVERTEBRAL DISC DEGENERATION, LUMBAR REGION: ICD-10-CM

## 2024-08-03 PROCEDURE — 72100 X-RAY EXAM L-S SPINE 2/3 VWS: CPT

## 2024-08-03 PROCEDURE — 72170 X-RAY EXAM OF PELVIS: CPT

## 2024-08-03 PROCEDURE — 99204 OFFICE O/P NEW MOD 45 MIN: CPT

## 2024-08-03 RX ORDER — METHYLPREDNISOLONE 4 MG/1
4 TABLET ORAL
Qty: 1 | Refills: 0 | Status: ACTIVE | COMMUNITY
Start: 2024-08-03 | End: 1900-01-01

## 2024-08-03 RX ORDER — TIZANIDINE 4 MG/1
4 TABLET ORAL
Qty: 60 | Refills: 0 | Status: ACTIVE | COMMUNITY
Start: 2024-08-03 | End: 1900-01-01

## 2024-08-03 NOTE — IMAGING
[Disc space narrowing] : Disc space narrowing [FreeTextEntry1] : Multilevel degenerative findings with disc space narrowing and anterior osteophyte formation appreciated, with grade 1 spondylolisthesis at L4-5 [de-identified] : normal

## 2024-08-03 NOTE — HISTORY OF PRESENT ILLNESS
[Lower back] : lower back [9] : 9 [Localized] : localized [Sharp] : sharp [Stabbing] : stabbing [Constant] : constant [Sitting] : sitting [Standing] : standing [Walking] : walking [Lying in bed] : lying in bed [Retired] : Work status: retired [de-identified] :  08/03/2024: He presents with family member for evaluation 1+ week left-sided lower back pain.  He states pain came on after some lifting.  He saw his primary care physician who gave prescriptions for diclofenac and methocarbamol with minimal help and he reports GI issues after the diclofenac.  He does state radicular complaints in the left lower extremity and has been ambulating with a limp.  He states past medical history of diabetes insipidus  He is retired [] : Post Surgical Visit: no [FreeTextEntry5] : Patient complains of low back pain since 1-2 weeks ago, no specific injury, went to his PCP and was given muscle relaxers did not help. H/O low back pain.

## 2024-08-03 NOTE — ASSESSMENT
[FreeTextEntry1] : -Prescriptions to be provided for Medrol pack and tizanidine -Prescription for course of physical therapy -Discussed the role of ice and heat treatments -He will follow-up in 1 week at [place] location with Dr. Mcadams if symptoms persist would consider MRI and possible LESI consult

## 2024-08-26 ENCOUNTER — NON-APPOINTMENT (OUTPATIENT)
Age: 69
End: 2024-08-26

## 2024-08-26 DIAGNOSIS — D35.2 BENIGN NEOPLASM OF PITUITARY GLAND: ICD-10-CM

## 2024-08-26 DIAGNOSIS — R79.89 OTHER SPECIFIED ABNORMAL FINDINGS OF BLOOD CHEMISTRY: ICD-10-CM

## 2024-08-26 DIAGNOSIS — E03.9 HYPOTHYROIDISM, UNSPECIFIED: ICD-10-CM

## 2024-09-03 LAB
ACTH SER-ACNC: 12.6 PG/ML
ALBUMIN SERPL ELPH-MCNC: 4.5 G/DL
ALP BLD-CCNC: 63 U/L
ALT SERPL-CCNC: 68 U/L
ANION GAP SERPL CALC-SCNC: 13 MMOL/L
AST SERPL-CCNC: 32 U/L
BASOPHILS # BLD AUTO: 0.04 K/UL
BASOPHILS NFR BLD AUTO: 0.4 %
BILIRUB SERPL-MCNC: 0.5 MG/DL
BUN SERPL-MCNC: 26 MG/DL
CALCIUM SERPL-MCNC: 9.8 MG/DL
CHLORIDE SERPL-SCNC: 99 MMOL/L
CHOLEST SERPL-MCNC: 206 MG/DL
CO2 SERPL-SCNC: 24 MMOL/L
CORTIS SERPL-MCNC: 5.6 UG/DL
CREAT SERPL-MCNC: 1.13 MG/DL
EGFR: 71 ML/MIN/1.73M2
EOSINOPHIL # BLD AUTO: 0.13 K/UL
EOSINOPHIL NFR BLD AUTO: 1.4 %
ESTIMATED AVERAGE GLUCOSE: 114 MG/DL
FSH SERPL-MCNC: 3.9 IU/L
GLUCOSE SERPL-MCNC: 62 MG/DL
HBA1C MFR BLD HPLC: 5.6 %
HCT VFR BLD CALC: 42.1 %
HDLC SERPL-MCNC: 71 MG/DL
HGB BLD-MCNC: 14.6 G/DL
IMM GRANULOCYTES NFR BLD AUTO: 0.3 %
LDLC SERPL CALC-MCNC: 115 MG/DL
LH SERPL-ACNC: 3.6 IU/L
LYMPHOCYTES # BLD AUTO: 2.61 K/UL
LYMPHOCYTES NFR BLD AUTO: 28.4 %
MAN DIFF?: NORMAL
MCHC RBC-ENTMCNC: 29.5 PG
MCHC RBC-ENTMCNC: 34.7 GM/DL
MCV RBC AUTO: 85.1 FL
MONOCYTES # BLD AUTO: 0.69 K/UL
MONOCYTES NFR BLD AUTO: 7.5 %
NEUTROPHILS # BLD AUTO: 5.69 K/UL
NEUTROPHILS NFR BLD AUTO: 62 %
NONHDLC SERPL-MCNC: 135 MG/DL
OSMOLALITY UR: 938 MOSM/KG
PLATELET # BLD AUTO: 210 K/UL
POTASSIUM SERPL-SCNC: 4 MMOL/L
PROLACTIN SERPL-MCNC: 12.4 NG/ML
PROT SERPL-MCNC: 7.1 G/DL
RBC # BLD: 4.95 M/UL
RBC # FLD: 14.2 %
SODIUM SERPL-SCNC: 137 MMOL/L
T4 FREE SERPL-MCNC: 1.7 NG/DL
TESTOST SERPL-MCNC: 305 NG/DL
TRIGL SERPL-MCNC: 112 MG/DL
TSH SERPL-ACNC: 0.88 UIU/ML
WBC # FLD AUTO: 9.19 K/UL

## 2024-09-04 DIAGNOSIS — E23.2 DIABETES INSIPIDUS: ICD-10-CM

## 2024-09-14 LAB
ANION GAP SERPL CALC-SCNC: 10 MMOL/L
BUN SERPL-MCNC: 24 MG/DL
CALCIUM SERPL-MCNC: 9.2 MG/DL
CHLORIDE SERPL-SCNC: 103 MMOL/L
CO2 SERPL-SCNC: 25 MMOL/L
CREAT SERPL-MCNC: 1.04 MG/DL
EGFR: 78 ML/MIN/1.73M2
GLUCOSE SERPL-MCNC: 87 MG/DL
POTASSIUM SERPL-SCNC: 4.4 MMOL/L
SODIUM SERPL-SCNC: 138 MMOL/L

## 2024-10-01 ENCOUNTER — RX RENEWAL (OUTPATIENT)
Age: 69
End: 2024-10-01

## 2024-10-01 NOTE — PATIENT PROFILE ADULT - FALL HARM RISK - ATTEMPT OOB
[Normal Appearance] : normal appearance [Edema] : no peripheral edema [] : no respiratory distress [Bowel Sounds] : normal bowel sounds [Abdomen Tenderness] : non-tender [Urethral Meatus] : meatus normal [Epididymis] : the epididymides were normal [Testes Tenderness] : no tenderness of the testes [Testes Mass (___cm)] : there were no testicular masses No

## 2024-10-24 DIAGNOSIS — E03.9 HYPOTHYROIDISM, UNSPECIFIED: ICD-10-CM

## 2024-10-24 DIAGNOSIS — R79.89 OTHER SPECIFIED ABNORMAL FINDINGS OF BLOOD CHEMISTRY: ICD-10-CM

## 2024-10-25 LAB
ALBUMIN SERPL ELPH-MCNC: 4.6 G/DL
ALP BLD-CCNC: 74 U/L
ALT SERPL-CCNC: 28 U/L
ANION GAP SERPL CALC-SCNC: 9 MMOL/L
AST SERPL-CCNC: 20 U/L
BILIRUB SERPL-MCNC: 0.4 MG/DL
BUN SERPL-MCNC: 19 MG/DL
CALCIUM SERPL-MCNC: 9.9 MG/DL
CHLORIDE SERPL-SCNC: 93 MMOL/L
CO2 SERPL-SCNC: 28 MMOL/L
CREAT SERPL-MCNC: 1.16 MG/DL
EGFR: 69 ML/MIN/1.73M2
ESTIMATED AVERAGE GLUCOSE: 114 MG/DL
GLUCOSE SERPL-MCNC: 94 MG/DL
HBA1C MFR BLD HPLC: 5.6 %
OSMOLALITY UR: 144 MOSM/KG
POTASSIUM SERPL-SCNC: 4.8 MMOL/L
PROT SERPL-MCNC: 7.2 G/DL
SODIUM SERPL-SCNC: 130 MMOL/L
T3 SERPL-MCNC: 56 NG/DL
T4 FREE SERPL-MCNC: 1.3 NG/DL
TSH SERPL-ACNC: 0.85 UIU/ML

## 2024-11-06 ENCOUNTER — APPOINTMENT (OUTPATIENT)
Dept: ENDOCRINOLOGY | Facility: CLINIC | Age: 69
End: 2024-11-06
Payer: MEDICARE

## 2024-11-06 VITALS
HEIGHT: 72 IN | BODY MASS INDEX: 27.09 KG/M2 | DIASTOLIC BLOOD PRESSURE: 74 MMHG | WEIGHT: 200 LBS | OXYGEN SATURATION: 99 % | SYSTOLIC BLOOD PRESSURE: 126 MMHG | HEART RATE: 72 BPM

## 2024-11-06 DIAGNOSIS — E05.90 THYROTOXICOSIS, UNSPECIFIED W/OUT THYROTOXIC CRISIS OR STORM: ICD-10-CM

## 2024-11-06 DIAGNOSIS — E03.9 HYPOTHYROIDISM, UNSPECIFIED: ICD-10-CM

## 2024-11-06 PROCEDURE — 99214 OFFICE O/P EST MOD 30 MIN: CPT

## 2024-11-07 DIAGNOSIS — E23.2 DIABETES INSIPIDUS: ICD-10-CM

## 2024-11-07 DIAGNOSIS — D35.2 BENIGN NEOPLASM OF PITUITARY GLAND: ICD-10-CM

## 2024-11-07 LAB
ALBUMIN SERPL ELPH-MCNC: 4.4 G/DL
ALP BLD-CCNC: 61 U/L
ALT SERPL-CCNC: 27 U/L
ANION GAP SERPL CALC-SCNC: 12 MMOL/L
AST SERPL-CCNC: 26 U/L
BILIRUB SERPL-MCNC: 0.4 MG/DL
BUN SERPL-MCNC: 21 MG/DL
CALCIUM SERPL-MCNC: 9.6 MG/DL
CHLORIDE SERPL-SCNC: 102 MMOL/L
CO2 SERPL-SCNC: 24 MMOL/L
CORTIS SERPL-MCNC: 3.2 UG/DL
CREAT SERPL-MCNC: 1.17 MG/DL
EGFR: 68 ML/MIN/1.73M2
GLUCOSE SERPL-MCNC: 106 MG/DL
POTASSIUM SERPL-SCNC: 4 MMOL/L
PROT SERPL-MCNC: 6.9 G/DL
SODIUM SERPL-SCNC: 138 MMOL/L

## 2024-12-24 PROBLEM — F10.90 ALCOHOL USE: Status: INACTIVE | Noted: 2023-03-08

## 2025-01-06 DIAGNOSIS — E23.2 DIABETES INSIPIDUS: ICD-10-CM

## 2025-01-07 LAB
ACTH SER-ACNC: 13.5 PG/ML
ALBUMIN SERPL ELPH-MCNC: 4.5 G/DL
ALP BLD-CCNC: 76 U/L
ALT SERPL-CCNC: 22 U/L
ANION GAP SERPL CALC-SCNC: 10 MMOL/L
AST SERPL-CCNC: 31 U/L
BILIRUB SERPL-MCNC: 0.5 MG/DL
BUN SERPL-MCNC: 16 MG/DL
CALCIUM SERPL-MCNC: 9.9 MG/DL
CHLORIDE SERPL-SCNC: 99 MMOL/L
CO2 SERPL-SCNC: 25 MMOL/L
CORTIS SERPL-MCNC: 10.1 UG/DL
CREAT SERPL-MCNC: 1.26 MG/DL
EGFR: 62 ML/MIN/1.73M2
GLUCOSE SERPL-MCNC: 90 MG/DL
OSMOLALITY UR: 166 MOSM/KG
POTASSIUM SERPL-SCNC: 4.7 MMOL/L
PROT SERPL-MCNC: 7.3 G/DL
SODIUM SERPL-SCNC: 134 MMOL/L
T4 FREE SERPL-MCNC: 1.1 NG/DL
TSH SERPL-ACNC: 2.19 UIU/ML

## 2025-01-14 ENCOUNTER — APPOINTMENT (OUTPATIENT)
Dept: ORTHOPEDIC SURGERY | Facility: CLINIC | Age: 70
End: 2025-01-14

## 2025-01-14 ENCOUNTER — APPOINTMENT (OUTPATIENT)
Dept: MRI IMAGING | Facility: CLINIC | Age: 70
End: 2025-01-14
Payer: MEDICARE

## 2025-01-14 VITALS — WEIGHT: 200 LBS | BODY MASS INDEX: 27.09 KG/M2 | HEIGHT: 72 IN

## 2025-01-14 DIAGNOSIS — M19.041 PRIMARY OSTEOARTHRITIS, RIGHT HAND: ICD-10-CM

## 2025-01-14 PROBLEM — S60.551A FOREIGN BODY OF RIGHT HAND, INITIAL ENCOUNTER: Status: ACTIVE | Noted: 2025-01-14

## 2025-01-14 PROCEDURE — 73218 MRI UPPER EXTREMITY W/O DYE: CPT | Mod: RT

## 2025-01-14 PROCEDURE — 99213 OFFICE O/P EST LOW 20 MIN: CPT

## 2025-01-14 PROCEDURE — 73140 X-RAY EXAM OF FINGER(S): CPT | Mod: RT

## 2025-01-23 ENCOUNTER — APPOINTMENT (OUTPATIENT)
Dept: ORTHOPEDIC SURGERY | Facility: CLINIC | Age: 70
End: 2025-01-23

## 2025-01-23 VITALS — HEIGHT: 72 IN | BODY MASS INDEX: 27.09 KG/M2 | WEIGHT: 200 LBS

## 2025-01-23 DIAGNOSIS — S60.551A SUPERFICIAL FOREIGN BODY OF RIGHT HAND, INITIAL ENCOUNTER: ICD-10-CM

## 2025-01-23 PROCEDURE — 99214 OFFICE O/P EST MOD 30 MIN: CPT

## 2025-03-31 RX ORDER — HYDROCODONE BITARTRATE AND ACETAMINOPHEN 5; 325 MG/1; MG/1
5-325 TABLET ORAL
Qty: 15 | Refills: 0 | Status: ACTIVE | COMMUNITY
Start: 2025-03-31 | End: 1900-01-01

## 2025-04-02 ENCOUNTER — APPOINTMENT (OUTPATIENT)
Dept: ORTHOPEDIC SURGERY | Facility: AMBULATORY SURGERY CENTER | Age: 70
End: 2025-04-02
Payer: MEDICARE

## 2025-04-02 PROCEDURE — 20525 RMVL FB MUSC/TDN DEEP/COMP: CPT | Mod: F5

## 2025-04-02 PROCEDURE — 20525 RMVL FB MUSC/TDN DEEP/COMP: CPT | Mod: AS,F5

## 2025-04-08 ENCOUNTER — APPOINTMENT (OUTPATIENT)
Dept: ORTHOPEDIC SURGERY | Facility: CLINIC | Age: 70
End: 2025-04-08
Payer: MEDICARE

## 2025-04-08 VITALS — HEIGHT: 72 IN | WEIGHT: 200 LBS | BODY MASS INDEX: 27.09 KG/M2

## 2025-04-08 DIAGNOSIS — S60.551A SUPERFICIAL FOREIGN BODY OF RIGHT HAND, INITIAL ENCOUNTER: ICD-10-CM

## 2025-04-08 PROCEDURE — 99024 POSTOP FOLLOW-UP VISIT: CPT

## 2025-09-02 DIAGNOSIS — D35.2 BENIGN NEOPLASM OF PITUITARY GLAND: ICD-10-CM

## 2025-09-02 DIAGNOSIS — E03.9 HYPOTHYROIDISM, UNSPECIFIED: ICD-10-CM

## 2025-09-03 LAB
ACTH SER-ACNC: 11.8 PG/ML
ALBUMIN SERPL ELPH-MCNC: 4.2 G/DL
ALP BLD-CCNC: 62 U/L
ALT SERPL-CCNC: 21 U/L
ANION GAP SERPL CALC-SCNC: 12 MMOL/L
AST SERPL-CCNC: 26 U/L
BASOPHILS # BLD AUTO: 0.02 K/UL
BASOPHILS NFR BLD AUTO: 0.4 %
BILIRUB SERPL-MCNC: 0.5 MG/DL
BUN SERPL-MCNC: 25 MG/DL
CALCIUM SERPL-MCNC: 9.3 MG/DL
CHLORIDE SERPL-SCNC: 107 MMOL/L
CO2 SERPL-SCNC: 23 MMOL/L
CREAT SERPL-MCNC: 1.31 MG/DL
EGFRCR SERPLBLD CKD-EPI 2021: 59 ML/MIN/1.73M2
EOSINOPHIL # BLD AUTO: 0.09 K/UL
EOSINOPHIL NFR BLD AUTO: 1.6 %
ESTIMATED AVERAGE GLUCOSE: 111 MG/DL
GLUCOSE SERPL-MCNC: 96 MG/DL
HBA1C MFR BLD HPLC: 5.5 %
HCT VFR BLD CALC: 42 %
HGB BLD-MCNC: 14 G/DL
IMM GRANULOCYTES NFR BLD AUTO: 0.2 %
LYMPHOCYTES # BLD AUTO: 1.32 K/UL
LYMPHOCYTES NFR BLD AUTO: 23.3 %
MAGNESIUM SERPL-MCNC: 1.9 MG/DL
MAN DIFF?: NORMAL
MCHC RBC-ENTMCNC: 29.7 PG
MCHC RBC-ENTMCNC: 33.3 G/DL
MCV RBC AUTO: 89.2 FL
MONOCYTES # BLD AUTO: 0.38 K/UL
MONOCYTES NFR BLD AUTO: 6.7 %
NEUTROPHILS # BLD AUTO: 3.85 K/UL
NEUTROPHILS NFR BLD AUTO: 67.8 %
OSMOLALITY SERPL: 299 MOSM/KG
OSMOLALITY UR: 1032 MOSM/KG
PHOSPHATE SERPL-MCNC: 2.8 MG/DL
PLATELET # BLD AUTO: 173 K/UL
POTASSIUM SERPL-SCNC: 4.1 MMOL/L
PROT SERPL-MCNC: 7 G/DL
RBC # BLD: 4.71 M/UL
RBC # FLD: 15 %
SODIUM SERPL-SCNC: 141 MMOL/L
T4 FREE SERPL-MCNC: 1.1 NG/DL
TSH SERPL-ACNC: 1.36 UIU/ML
WBC # FLD AUTO: 5.67 K/UL

## 2025-09-04 LAB — CORTIS SERPL-MCNC: 11.6 UG/DL

## 2025-09-17 ENCOUNTER — APPOINTMENT (OUTPATIENT)
Dept: ENDOCRINOLOGY | Facility: CLINIC | Age: 70
End: 2025-09-17
Payer: MEDICARE

## 2025-09-17 VITALS
SYSTOLIC BLOOD PRESSURE: 130 MMHG | HEART RATE: 77 BPM | BODY MASS INDEX: 27.16 KG/M2 | HEIGHT: 72 IN | DIASTOLIC BLOOD PRESSURE: 75 MMHG | WEIGHT: 200.56 LBS | OXYGEN SATURATION: 97 %

## 2025-09-17 DIAGNOSIS — D35.2 BENIGN NEOPLASM OF PITUITARY GLAND: ICD-10-CM

## 2025-09-17 DIAGNOSIS — R79.89 OTHER SPECIFIED ABNORMAL FINDINGS OF BLOOD CHEMISTRY: ICD-10-CM

## 2025-09-17 DIAGNOSIS — E05.90 THYROTOXICOSIS, UNSPECIFIED W/OUT THYROTOXIC CRISIS OR STORM: ICD-10-CM

## 2025-09-17 DIAGNOSIS — E23.2 DIABETES INSIPIDUS: ICD-10-CM

## 2025-09-17 PROCEDURE — G2211 COMPLEX E/M VISIT ADD ON: CPT

## 2025-09-17 PROCEDURE — 99214 OFFICE O/P EST MOD 30 MIN: CPT

## (undated) DEVICE — SPECIMEN CONTAINER 100ML

## (undated) DEVICE — D HELP - CLEARVIEW CLEARIFY SYSTEM

## (undated) DEVICE — DRAPE LEGGINGS XL

## (undated) DEVICE — WARMING BLANKET UPPER ADULT

## (undated) DEVICE — GELPORT LAPAROSCOPIC SYSTEM

## (undated) DEVICE — GLV 8.5 PROTEXIS (WHITE)

## (undated) DEVICE — DRSG TELFA 3 X 8

## (undated) DEVICE — TUBING STRYKEFLOW II SUCTION / IRRIGATOR

## (undated) DEVICE — DRAPE TOWEL BLUE 17" X 24"

## (undated) DEVICE — BLADE SCALPEL SAFETYLOCK #10

## (undated) DEVICE — PREP BETADINE KIT

## (undated) DEVICE — WARMING BLANKET FULL ADULT

## (undated) DEVICE — SUT CHROMIC 1 30" GS-21

## (undated) DEVICE — SUT CHROMIC 3-0 30" V-20

## (undated) DEVICE — ADAPTER URETHRAL CATH CONNECTING

## (undated) DEVICE — GOWN TRIMAX LG

## (undated) DEVICE — SOL IRR POUR H2O 250ML

## (undated) DEVICE — ACMI SELF-SEALING SEAL UP TO 7FR

## (undated) DEVICE — GLV 8 PROTEXIS (WHITE)

## (undated) DEVICE — PACK MAJOR ABDOMINAL SUPINE

## (undated) DEVICE — FOLEY TRAY 16FR 5CC LTX UMETER CLOSED

## (undated) DEVICE — SUT MAXON 1 36" GS-24

## (undated) DEVICE — VALVE YELLOW PORT SEAL PLUS 5MM

## (undated) DEVICE — TAPE SILK 3"

## (undated) DEVICE — VENODYNE/SCD SLEEVE CALF MEDIUM

## (undated) DEVICE — SOL IRR POUR NS 0.9% 500ML

## (undated) DEVICE — Device

## (undated) DEVICE — TUBING RANGER FLUID IRRIGATION SET DISP

## (undated) DEVICE — DRSG OPSITE 2.5 X 2"

## (undated) DEVICE — ELCTR BOVIE PENCIL SMOKE EVACUATION

## (undated) DEVICE — BLADE SCALPEL SAFETYLOCK #15

## (undated) DEVICE — DRAPE GENERAL ENDOSCOPY

## (undated) DEVICE — SUT SURGIPRO 0 30" GS-22

## (undated) DEVICE — SOL IRR BAG H2O 3000ML

## (undated) DEVICE — PACK COLON BUNDLE

## (undated) DEVICE — VENODYNE/SCD SLEEVE CALF LARGE

## (undated) DEVICE — SUT POLYSORB 3-0 30" V-20 UNDYED

## (undated) DEVICE — TUBING INSUFFLATION LAP FILTER 10FT

## (undated) DEVICE — TROCAR APPLIED MEDICAL KII BALLOON BLUNT TIP 12MM X 100MM

## (undated) DEVICE — SYR ASEPTO

## (undated) DEVICE — SYR LUER LOK 30CC

## (undated) DEVICE — GLV 7 PROTEXIS (WHITE)

## (undated) DEVICE — SUT CHROMIC 0 36" GS-21

## (undated) DEVICE — SUT POLYSORB 0 36" GU-46

## (undated) DEVICE — PACK CYSTO

## (undated) DEVICE — LIGASURE IMPACT

## (undated) DEVICE — DRSG TAPE UMBILICAL COTTON 2" X 30 X 1/8"

## (undated) DEVICE — LIGASURE BLUNT TIP 37CM

## (undated) DEVICE — FOLEY CATH 2-WAY 28FR 30CC SILICONE

## (undated) DEVICE — OSTOMY KIT 2-PIECE 2.25" NS (RED)

## (undated) DEVICE — DRAPE 1/2 SHEET 40X57"

## (undated) DEVICE — POSITIONER FOAM EGG CRATE ULNAR 2PCS (PINK)

## (undated) DEVICE — GLV 6.5 PROTEXIS (WHITE)

## (undated) DEVICE — DRSG OPSITE 13.75 X 4"

## (undated) DEVICE — GLV 7.5 PROTEXIS (WHITE)